# Patient Record
Sex: FEMALE | Race: WHITE | NOT HISPANIC OR LATINO | Employment: FULL TIME | URBAN - METROPOLITAN AREA
[De-identification: names, ages, dates, MRNs, and addresses within clinical notes are randomized per-mention and may not be internally consistent; named-entity substitution may affect disease eponyms.]

---

## 2017-01-26 ENCOUNTER — HISTORICAL (OUTPATIENT)
Dept: ADMINISTRATIVE | Facility: HOSPITAL | Age: 47
End: 2017-01-26

## 2017-01-26 LAB
ALBUMIN SERPL-MCNC: 4.2 G/DL (ref 3.1–4.7)
ALP SERPL-CCNC: 72 IU/L (ref 40–104)
ALT (SGPT): 21 IU/L (ref 3–33)
AST SERPL-CCNC: 16 IU/L (ref 10–40)
BASOPHILS NFR BLD: 0.1 K/UL (ref 0–0.2)
BASOPHILS NFR BLD: 1 %
BILIRUB SERPL-MCNC: 0.4 MG/DL (ref 0.3–1)
BUN SERPL-MCNC: 14 MG/DL (ref 8–20)
CALCIUM SERPL-MCNC: 9.6 MG/DL (ref 7.7–10.4)
CHLORIDE: 104 MMOL/L (ref 98–110)
CO2 SERPL-SCNC: 26.9 MMOL/L (ref 22.8–31.6)
CREATININE: 0.71 MG/DL (ref 0.6–1.4)
CRP SERPL-MCNC: 0.96 MG/DL (ref 0–1.4)
EOSINOPHIL NFR BLD: 0.3 K/UL (ref 0–0.7)
EOSINOPHIL NFR BLD: 3.7 %
ERYTHROCYTE [DISTWIDTH] IN BLOOD BY AUTOMATED COUNT: 16.9 % (ref 11.7–14.9)
GLUCOSE: 102 MG/DL (ref 70–99)
GRAN #: 5.7 K/UL (ref 1.4–6.5)
GRAN%: 69.1 %
HCT VFR BLD AUTO: 36.4 % (ref 36–48)
HGB BLD-MCNC: 11.4 G/DL (ref 12–15)
IMMATURE GRANS (ABS): 0 K/UL (ref 0–1)
IMMATURE GRANULOCYTES: 0.5 %
LYMPH #: 1.6 K/UL (ref 1.2–3.4)
LYMPH%: 19.1 %
MCH RBC QN AUTO: 25.1 PG (ref 25–35)
MCHC RBC AUTO-ENTMCNC: 31.3 G/DL (ref 31–36)
MCV RBC AUTO: 80 FL (ref 79–98)
MONO #: 0.6 K/UL (ref 0.1–0.6)
MONO%: 6.6 %
NUCLEATED RBCS: 0 %
PLATELET # BLD AUTO: 334 K/UL (ref 140–440)
PMV BLD AUTO: 10.7 FL (ref 8.8–12.7)
POTASSIUM SERPL-SCNC: 4.1 MMOL/L (ref 3.5–5)
PROT SERPL-MCNC: 7 G/DL (ref 6–8.2)
RBC # BLD AUTO: 4.55 M/UL (ref 3.5–5.5)
SODIUM: 140 MMOL/L (ref 134–144)
WBC # BLD AUTO: 8.3 K/UL (ref 5–10)

## 2017-04-13 ENCOUNTER — HISTORICAL (OUTPATIENT)
Dept: ADMINISTRATIVE | Facility: HOSPITAL | Age: 47
End: 2017-04-13

## 2017-04-13 LAB
ALBUMIN SERPL-MCNC: 4.1 G/DL (ref 3.1–4.7)
ALP SERPL-CCNC: 68 IU/L (ref 40–104)
ALT (SGPT): 22 IU/L (ref 3–33)
AST SERPL-CCNC: 16 IU/L (ref 10–40)
BASOPHILS NFR BLD: 0.1 K/UL (ref 0–0.2)
BASOPHILS NFR BLD: 0.9 %
BILIRUB SERPL-MCNC: 0.4 MG/DL (ref 0.3–1)
BUN SERPL-MCNC: 19 MG/DL (ref 8–20)
CALCIUM SERPL-MCNC: 9.6 MG/DL (ref 7.7–10.4)
CHLORIDE: 103 MMOL/L (ref 98–110)
CO2 SERPL-SCNC: 27.3 MMOL/L (ref 22.8–31.6)
CREATININE: 0.94 MG/DL (ref 0.6–1.4)
CRP SERPL-MCNC: 0.51 MG/DL (ref 0–1.4)
EOSINOPHIL NFR BLD: 0.3 K/UL (ref 0–0.7)
EOSINOPHIL NFR BLD: 3.9 %
ERYTHROCYTE [DISTWIDTH] IN BLOOD BY AUTOMATED COUNT: 16.6 % (ref 11.7–14.9)
GLUCOSE: 100 MG/DL (ref 70–99)
GRAN #: 5.7 K/UL (ref 1.4–6.5)
GRAN%: 66.5 %
HCT VFR BLD AUTO: 37.9 % (ref 36–48)
HGB BLD-MCNC: 11.7 G/DL (ref 12–15)
IMMATURE GRANS (ABS): 0.1 K/UL (ref 0–1)
IMMATURE GRANULOCYTES: 0.6 %
LYMPH #: 1.8 K/UL (ref 1.2–3.4)
LYMPH%: 21.2 %
MCH RBC QN AUTO: 24.7 PG (ref 25–35)
MCHC RBC AUTO-ENTMCNC: 30.9 G/DL (ref 31–36)
MCV RBC AUTO: 80.1 FL (ref 79–98)
MONO #: 0.6 K/UL (ref 0.1–0.6)
MONO%: 6.9 %
NUCLEATED RBCS: 0 %
PLATELET # BLD AUTO: 336 K/UL (ref 140–440)
PMV BLD AUTO: 10.8 FL (ref 8.8–12.7)
POTASSIUM SERPL-SCNC: 4.1 MMOL/L (ref 3.5–5)
PROT SERPL-MCNC: 7 G/DL (ref 6–8.2)
RBC # BLD AUTO: 4.73 M/UL (ref 3.5–5.5)
SODIUM: 140 MMOL/L (ref 134–144)
URATE SERPL-MCNC: 4.6 MG/DL (ref 2.6–7.8)
WBC # BLD AUTO: 8.6 K/UL (ref 5–10)

## 2017-07-19 ENCOUNTER — OFFICE VISIT (OUTPATIENT)
Dept: RHEUMATOLOGY | Facility: CLINIC | Age: 47
End: 2017-07-19
Payer: COMMERCIAL

## 2017-07-19 VITALS
DIASTOLIC BLOOD PRESSURE: 84 MMHG | HEIGHT: 62 IN | BODY MASS INDEX: 43.36 KG/M2 | WEIGHT: 235.63 LBS | SYSTOLIC BLOOD PRESSURE: 130 MMHG

## 2017-07-19 DIAGNOSIS — M05.742 RHEUMATOID ARTHRITIS INVOLVING BOTH HANDS WITH POSITIVE RHEUMATOID FACTOR: Primary | ICD-10-CM

## 2017-07-19 DIAGNOSIS — M05.741 RHEUMATOID ARTHRITIS INVOLVING BOTH HANDS WITH POSITIVE RHEUMATOID FACTOR: Primary | ICD-10-CM

## 2017-07-19 LAB
ALBUMIN SERPL-MCNC: 4.1 G/DL (ref 3.1–4.7)
ALP SERPL-CCNC: 74 IU/L (ref 40–104)
ALT (SGPT): 20 IU/L (ref 3–33)
AST SERPL-CCNC: 14 IU/L (ref 10–40)
BASOPHILS NFR BLD: 0.1 K/UL (ref 0–0.2)
BASOPHILS NFR BLD: 0.9 %
BILIRUB SERPL-MCNC: 0.5 MG/DL (ref 0.3–1)
BUN SERPL-MCNC: 14 MG/DL (ref 8–20)
CALCIUM SERPL-MCNC: 9.3 MG/DL (ref 7.7–10.4)
CHLORIDE: 107 MMOL/L (ref 98–110)
CO2 SERPL-SCNC: 24.7 MMOL/L (ref 22.8–31.6)
CREATININE: 0.78 MG/DL (ref 0.6–1.4)
CRP SERPL-MCNC: 0.68 MG/DL (ref 0–1.4)
EOSINOPHIL NFR BLD: 0.2 K/UL (ref 0–0.7)
EOSINOPHIL NFR BLD: 2.3 %
ERYTHROCYTE [DISTWIDTH] IN BLOOD BY AUTOMATED COUNT: 18 % (ref 11.7–14.9)
GLUCOSE: 104 MG/DL (ref 70–99)
GRAN #: 7.3 K/UL (ref 1.4–6.5)
GRAN%: 72.2 %
HCT VFR BLD AUTO: 38.4 % (ref 36–48)
HGB BLD-MCNC: 12.1 G/DL (ref 12–15)
IMMATURE GRANS (ABS): 0 K/UL (ref 0–1)
IMMATURE GRANULOCYTES: 0.4 %
LYMPH #: 1.8 K/UL (ref 1.2–3.4)
LYMPH%: 18.2 %
MCH RBC QN AUTO: 25 PG (ref 25–35)
MCHC RBC AUTO-ENTMCNC: 31.5 G/DL (ref 31–36)
MCV RBC AUTO: 79.3 FL (ref 79–98)
MONO #: 0.6 K/UL (ref 0.1–0.6)
MONO%: 6 %
NUCLEATED RBCS: 0 %
PLATELET # BLD AUTO: 332 K/UL (ref 140–440)
PMV BLD AUTO: 10.9 FL (ref 8.8–12.7)
POTASSIUM SERPL-SCNC: 4 MMOL/L (ref 3.5–5)
PROT SERPL-MCNC: 7.2 G/DL (ref 6–8.2)
RBC # BLD AUTO: 4.84 M/UL (ref 3.5–5.5)
SODIUM: 141 MMOL/L (ref 134–144)
WBC # BLD AUTO: 10.1 K/UL (ref 5–10)

## 2017-07-19 PROCEDURE — 99213 OFFICE O/P EST LOW 20 MIN: CPT | Mod: ,,, | Performed by: INTERNAL MEDICINE

## 2017-07-19 RX ORDER — CYCLOBENZAPRINE HCL 10 MG
10 TABLET ORAL NIGHTLY
Qty: 30 TABLET | Refills: 0 | Status: SHIPPED | OUTPATIENT
Start: 2017-07-19 | End: 2017-08-13 | Stop reason: SDUPTHER

## 2017-07-19 RX ORDER — TRAMADOL HYDROCHLORIDE 50 MG/1
50 TABLET ORAL 2 TIMES DAILY PRN
COMMUNITY
End: 2017-08-01 | Stop reason: SDUPTHER

## 2017-07-19 RX ORDER — PREDNISONE 5 MG/1
5 TABLET ORAL
COMMUNITY
Start: 2017-01-26 | End: 2017-08-01 | Stop reason: SDUPTHER

## 2017-07-19 RX ORDER — HYDROXYCHLOROQUINE SULFATE 200 MG/1
200 TABLET, FILM COATED ORAL DAILY
COMMUNITY
Start: 2017-04-13 | End: 2018-01-15 | Stop reason: SDUPTHER

## 2017-07-19 RX ORDER — ONDANSETRON 4 MG/1
4 TABLET, ORALLY DISINTEGRATING ORAL EVERY 8 HOURS PRN
Refills: 1 | COMMUNITY
Start: 2017-04-17 | End: 2018-04-02

## 2017-07-19 RX ORDER — ESOMEPRAZOLE MAGNESIUM 20 MG/1
20 TABLET, DELAYED RELEASE ORAL DAILY
COMMUNITY

## 2017-07-19 RX ORDER — IBUPROFEN 800 MG/1
800 TABLET ORAL DAILY
COMMUNITY
End: 2017-12-09 | Stop reason: SDUPTHER

## 2017-07-19 RX ORDER — ACETAMINOPHEN AND CODEINE PHOSPHATE 300; 30 MG/1; MG/1
1 TABLET ORAL EVERY 8 HOURS PRN
Refills: 1 | COMMUNITY
Start: 2017-06-23 | End: 2018-01-30

## 2017-07-19 RX ORDER — METFORMIN HYDROCHLORIDE 500 MG/1
500 TABLET ORAL DAILY
COMMUNITY
End: 2022-12-20

## 2017-07-19 RX ORDER — FOLIC ACID 1 MG/1
1 TABLET ORAL DAILY
COMMUNITY
Start: 2017-01-26 | End: 2018-03-16 | Stop reason: SDUPTHER

## 2017-07-19 RX ORDER — LOSARTAN POTASSIUM 50 MG/1
50 TABLET ORAL DAILY
COMMUNITY

## 2017-07-19 RX ORDER — AMLODIPINE BESYLATE 10 MG/1
10 TABLET ORAL DAILY
COMMUNITY
End: 2023-10-04

## 2017-07-19 RX ORDER — FLUOXETINE HYDROCHLORIDE 40 MG/1
40 CAPSULE ORAL DAILY
COMMUNITY

## 2017-07-19 RX ORDER — METHOTREXATE 2.5 MG/1
2.5 TABLET ORAL WEEKLY
COMMUNITY
Start: 2017-04-13 | End: 2017-07-28 | Stop reason: SDUPTHER

## 2017-07-19 NOTE — PROGRESS NOTES
[facility] RHEUMATOLOGY            PROGRESS NOTE      Subjective:       Patient ID:   NAME: Annita Bettencourt : 1970     47 y.o. female    Referring Doc: No ref. provider found  Other Physicians:    Chief Complaint:  Rheumatoid Arthritis and Insomnia      History of Present Illness:     Patient returns today for a regularly scheduled follow-up visit for Rheumatoid arthritis      The patient is doing well except for fatigue from insomnia. She has tried melatonin and over-the-counter remedies but they are not effective  No prolonged morning stiffness or joint swelling. Occasional arthralgias relieved with her medication  No gastrointestinal complaints.            ROS:   GEN:  No  fever, night sweats . weight is stable   + fatigue  SKIN: no rashes, no bruising, no ulcerations, no Raynaud's  HEENT: no HA's, No visual changes, no mucosal ulcers, no sicca symptoms,  CV:   no CP, SOB, PND, SHI, no orthopnea, no palpitations  PULM: normal with no SOB, cough, hemoptysis, sputum or pleuritic pain  GI:  no abdominal pain, nausea, vomiting, constipation, diarrhea, melanotic stools, BRBPR, hematemesis, no dysphagia  NEURO: no paresthesia except occasionally on the fifth finger, no headaches, visual disturbances, muscle weakness  MUSCULOSKELETAL:no joint swelling, prolonged AM stiffness, no back pain, no muscle pain  Allergies:  Review of patient's allergies indicates:   Allergen Reactions    Sulfa (sulfonamide antibiotics) Rash       Medications:    Current Outpatient Prescriptions:     folic acid (FOLVITE) 1 MG tablet, Take 1 mg by mouth once daily., Disp: , Rfl:     hydroxychloroquine (PLAQUENIL) 200 mg tablet, Take 200 mg by mouth once daily., Disp: , Rfl:     methotrexate 2.5 MG Tab, Take 2.5 tablets by mouth once a week. Take 5 tablets once a week, Disp: , Rfl:     predniSONE (DELTASONE) 5 MG tablet, Take 5 mg by mouth as needed., Disp: , Rfl:     acetaminophen-codeine 300-30mg (TYLENOL #3) 300-30 mg Tab,  "Take 1 tablet by mouth every 8 (eight) hours as needed., Disp: , Rfl: 1    amlodipine (NORVASC) 10 MG tablet, Take 10 mg by mouth once daily., Disp: , Rfl:     esomeprazole magnesium (NEXIUM 24HR) 20 mg TbEC, Take 20 mg by mouth once daily., Disp: , Rfl:     fluoxetine (PROZAC) 40 MG capsule, Take 40 mg by mouth once daily., Disp: , Rfl:     ibuprofen (ADVIL,MOTRIN) 800 MG tablet, Take 800 mg by mouth once daily., Disp: , Rfl:     losartan (COZAAR) 50 MG tablet, Take 50 mg by mouth once daily., Disp: , Rfl:     metformin (GLUCOPHAGE) 500 MG tablet, Take 500 mg by mouth once daily., Disp: , Rfl:     ondansetron (ZOFRAN-ODT) 4 MG TbDL, Take 4 mg by mouth every 8 (eight) hours as needed., Disp: , Rfl: 1    tramadol (ULTRAM) 50 mg tablet, Take 50 mg by mouth 2 (two) times daily as needed., Disp: , Rfl:     PMHx/PSHx Updates:        Objective:     Vitals:  Blood pressure 130/84, height 5' 2" (1.575 m), weight 106.9 kg (235 lb 9.6 oz).    Physical Examination:   GEN: no apparent distress, comfortable; AAOx3  SKIN: no rashes,no ulceration, no Raynaud's, no petechiae, no SQ nodules,  HEAD: normal  EYES: no pallor, no icterus,   NECK: no masses, thyroid normal, trachea midline, no LAD/LN's, supple  CV: RRR with no murmur; l S1 and S2 reg. ,no gallop no rubs,   CHEST: Normal respiratory effort; CTAB; normal breath sounds; no wheeze or crackles  ABDOM: nontender and nondistended; soft; no masses; no rebound/guarding  MUSC/Skeletal: ROM normal; no crepitus; joints without synovitis,  no deformities  No joint swelling or tenderness of PIP, MCP, wrist, elbow, shoulder, or knee joints  EXTREM: no clubbing, cyanosis, no edema  NEURO: grossly intact; motor WNL; AAOx3; ,   PSYCH: normal mood, affect and behavior  LYMPH: normal cervical, supraclavicular          Labs:   Lab Results   Component Value Date    WBC 8.6 04/13/2017    HGB 11.7 (L) 04/13/2017    HCT 37.9 04/13/2017    MCV 80.1 04/13/2017     04/13/2017    " CMP  @LASTLAB(NA,K,CL,CO2,GLU,BUN,Creatinine,Calcium,PROT,Albumin,Bilitot,Alkphos,AST,ALT,CRP,ESR,RF,CCP,KERLINE,SSA,CPK,uric acid) )@  I have reviewed all available lab results and radiology reports.    Radiology/Diagnostic Studies:        Assessment/Plan:   (1) 47 y.o. female with diagnosis of Rheumatoid Arthritis  She is stable    2) Insomnia.. Try Flexeril 5-10 mg before bedtime. Explain measures to try to get a better sleep at night like avoiding caffeine,not watching TV or computer close to bedtime.    CBC, CMP, CRP        Discussion:     I have explained all of the above in detail and the patient understands all of the current recommendation(s). I have answered all questions to the best of my ability and to their complete satisfaction.       The patient is to continue with the current management plan         RTC in  3 months       Electronically signed by Santos Presley MD

## 2017-07-31 RX ORDER — METHOTREXATE 2.5 MG/1
TABLET ORAL
Qty: 20 TABLET | Refills: 2 | Status: SHIPPED | OUTPATIENT
Start: 2017-07-31 | End: 2017-10-16 | Stop reason: SDUPTHER

## 2017-08-01 RX ORDER — PREDNISONE 5 MG/1
TABLET ORAL
Qty: 100 TABLET | Refills: 1 | Status: SHIPPED | OUTPATIENT
Start: 2017-08-01 | End: 2018-08-07 | Stop reason: SDUPTHER

## 2017-08-01 RX ORDER — TRAMADOL HYDROCHLORIDE 50 MG/1
TABLET ORAL
Qty: 180 TABLET | Refills: 2 | Status: SHIPPED | OUTPATIENT
Start: 2017-08-01 | End: 2018-03-29 | Stop reason: SDUPTHER

## 2017-08-15 RX ORDER — CYCLOBENZAPRINE HCL 10 MG
TABLET ORAL
Qty: 30 TABLET | Refills: 0 | Status: SHIPPED | OUTPATIENT
Start: 2017-08-15 | End: 2017-10-02 | Stop reason: SDUPTHER

## 2017-10-02 RX ORDER — CYCLOBENZAPRINE HCL 10 MG
TABLET ORAL
Qty: 30 TABLET | Refills: 3 | Status: SHIPPED | OUTPATIENT
Start: 2017-10-02 | End: 2019-02-03 | Stop reason: SDUPTHER

## 2017-10-16 ENCOUNTER — OFFICE VISIT (OUTPATIENT)
Dept: RHEUMATOLOGY | Facility: CLINIC | Age: 47
End: 2017-10-16
Payer: COMMERCIAL

## 2017-10-16 VITALS
SYSTOLIC BLOOD PRESSURE: 132 MMHG | DIASTOLIC BLOOD PRESSURE: 80 MMHG | HEIGHT: 62 IN | WEIGHT: 247.69 LBS | BODY MASS INDEX: 45.58 KG/M2

## 2017-10-16 DIAGNOSIS — M05.79 RHEUMATOID ARTHRITIS INVOLVING MULTIPLE SITES WITH POSITIVE RHEUMATOID FACTOR: Primary | ICD-10-CM

## 2017-10-16 DIAGNOSIS — M70.62 TROCHANTERIC BURSITIS OF LEFT HIP: ICD-10-CM

## 2017-10-16 LAB
ALBUMIN SERPL-MCNC: 3.9 G/DL (ref 3.1–4.7)
ALP SERPL-CCNC: 71 IU/L (ref 40–104)
ALT (SGPT): 23 IU/L (ref 3–33)
AST SERPL-CCNC: 22 IU/L (ref 10–40)
BASOPHILS NFR BLD: 0.1 K/UL (ref 0–0.2)
BASOPHILS NFR BLD: 0.8 %
BILIRUB SERPL-MCNC: 0.6 MG/DL (ref 0.3–1)
BUN SERPL-MCNC: 11 MG/DL (ref 8–20)
CALCIUM SERPL-MCNC: 9.5 MG/DL (ref 7.7–10.4)
CHLORIDE: 104 MMOL/L (ref 98–110)
CO2 SERPL-SCNC: 27.3 MMOL/L (ref 22.8–31.6)
CREATININE: 0.66 MG/DL (ref 0.6–1.4)
CRP SERPL-MCNC: 0.95 MG/DL (ref 0–1.4)
EOSINOPHIL NFR BLD: 0.2 K/UL (ref 0–0.7)
EOSINOPHIL NFR BLD: 2.8 %
ERYTHROCYTE [DISTWIDTH] IN BLOOD BY AUTOMATED COUNT: 17.1 % (ref 11.7–14.9)
GLUCOSE: 107 MG/DL (ref 70–99)
GRAN #: 5.7 K/UL (ref 1.4–6.5)
GRAN%: 72.1 %
HCT VFR BLD AUTO: 38.8 % (ref 36–48)
HGB BLD-MCNC: 12.1 G/DL (ref 12–15)
IMMATURE GRANS (ABS): 0.1 K/UL (ref 0–1)
IMMATURE GRANULOCYTES: 0.6 %
LYMPH #: 1.4 K/UL (ref 1.2–3.4)
LYMPH%: 18.1 %
MCH RBC QN AUTO: 25.6 PG (ref 25–35)
MCHC RBC AUTO-ENTMCNC: 31.2 G/DL (ref 31–36)
MCV RBC AUTO: 82 FL (ref 79–98)
MONO #: 0.4 K/UL (ref 0.1–0.6)
MONO%: 5.6 %
NUCLEATED RBCS: 0 %
PLATELET # BLD AUTO: 314 K/UL (ref 140–440)
PMV BLD AUTO: 10.9 FL (ref 8.8–12.7)
POTASSIUM SERPL-SCNC: 4.1 MMOL/L (ref 3.5–5)
PROT SERPL-MCNC: 7.2 G/DL (ref 6–8.2)
RBC # BLD AUTO: 4.73 M/UL (ref 3.5–5.5)
SODIUM: 141 MMOL/L (ref 134–144)
TSH SERPL DL<=0.005 MIU/L-ACNC: 0.92 ULU/ML (ref 0.3–5.6)
WBC # BLD AUTO: 7.9 K/UL (ref 5–10)

## 2017-10-16 PROCEDURE — 20610 DRAIN/INJ JOINT/BURSA W/O US: CPT | Mod: LT,,, | Performed by: INTERNAL MEDICINE

## 2017-10-16 PROCEDURE — 99213 OFFICE O/P EST LOW 20 MIN: CPT | Mod: 25,,, | Performed by: INTERNAL MEDICINE

## 2017-10-16 RX ORDER — TRIAMCINOLONE ACETONIDE 40 MG/ML
40 INJECTION, SUSPENSION INTRA-ARTICULAR; INTRAMUSCULAR
Status: DISCONTINUED | OUTPATIENT
Start: 2017-10-16 | End: 2017-10-16 | Stop reason: HOSPADM

## 2017-10-16 RX ORDER — METHOTREXATE 2.5 MG/1
TABLET ORAL
Qty: 60 TABLET | Refills: 1 | Status: SHIPPED | OUTPATIENT
Start: 2017-10-16 | End: 2018-01-15 | Stop reason: SDUPTHER

## 2017-10-16 RX ORDER — DEXAMETHASONE SODIUM PHOSPHATE 4 MG/ML
2 INJECTION, SOLUTION INTRA-ARTICULAR; INTRALESIONAL; INTRAMUSCULAR; INTRAVENOUS; SOFT TISSUE
Status: DISCONTINUED | OUTPATIENT
Start: 2017-10-16 | End: 2017-10-16 | Stop reason: HOSPADM

## 2017-10-16 RX ADMIN — DEXAMETHASONE SODIUM PHOSPHATE 2 MG: 4 INJECTION, SOLUTION INTRA-ARTICULAR; INTRALESIONAL; INTRAMUSCULAR; INTRAVENOUS; SOFT TISSUE at 11:10

## 2017-10-16 RX ADMIN — TRIAMCINOLONE ACETONIDE 40 MG: 40 INJECTION, SUSPENSION INTRA-ARTICULAR; INTRAMUSCULAR at 11:10

## 2017-10-16 NOTE — PROGRESS NOTES
[facility] RHEUMATOLOGY            PROGRESS NOTE      Subjective:       Patient ID:   NAME: Annita Bettencourt : 1970     47 y.o. female    Referring Doc: No ref. provider found  Other Physicians:    Chief Complaint:  Rheumatoid Arthritis; Rash (bilateral LE); Swelling; and Pain (4/10 left hip and right shin)      History of Present Illness:     Patient returns today for a regularly scheduled follow-up visit for   RA    The patient is doing well re RA but had episode of rash on lower legs a few days ago with pedal edema  No CP,no cough  Some SHI  AM stiffness lasting 10-15 mins.Fatigue  Pain in the left trochanteric area.          ROS:   GEN:  No  fever, night sweats . weight is stable   + fatigue  SKIN: no rashes, no bruising, no ulcerations, no Raynaud's  HEENT: no HA's, No visual changes, no mucosal ulcers, no sicca symptoms,  CV:   no CP, SOB, PND, SHI, no orthopnea, no palpitations  PULM: normal with no SOB, cough, hemoptysis, sputum or pleuritic pain  GI:  no abdominal pain, nausea, vomiting, constipation, diarrhea, melanotic stools, BRBPR, hematemesis, no dysphagia  :   no dysuria  NEURO: no paresthesias, headaches, visual disturbances, muscle weakness  MUSCULOSKELETAL:no joint swelling, prolonged AM stiffness, no back pain, no muscle pain  Allergies:  Review of patient's allergies indicates:   Allergen Reactions    Sulfa (sulfonamide antibiotics) Rash       Medications:    Current Outpatient Prescriptions:     acetaminophen-codeine 300-30mg (TYLENOL #3) 300-30 mg Tab, Take 1 tablet by mouth every 8 (eight) hours as needed., Disp: , Rfl: 1    amlodipine (NORVASC) 10 MG tablet, Take 10 mg by mouth once daily., Disp: , Rfl:     cyclobenzaprine (FLEXERIL) 10 MG tablet, TAKE 1 TABLET BY MOUTH EVERY EVENING, Disp: 30 tablet, Rfl: 3    esomeprazole magnesium (NEXIUM 24HR) 20 mg TbEC, Take 20 mg by mouth once daily., Disp: , Rfl:     fluoxetine (PROZAC) 40 MG capsule, Take 40 mg by mouth once  "daily., Disp: , Rfl:     folic acid (FOLVITE) 1 MG tablet, Take 1 mg by mouth once daily., Disp: , Rfl:     hydroxychloroquine (PLAQUENIL) 200 mg tablet, Take 200 mg by mouth once daily., Disp: , Rfl:     ibuprofen (ADVIL,MOTRIN) 800 MG tablet, Take 800 mg by mouth once daily., Disp: , Rfl:     losartan (COZAAR) 50 MG tablet, Take 50 mg by mouth once daily., Disp: , Rfl:     metformin (GLUCOPHAGE) 500 MG tablet, Take 500 mg by mouth once daily., Disp: , Rfl:     methotrexate 2.5 MG Tab, TAKE 5 TABLETS BY MOUTH EVERY WEEK, Disp: 60 tablet, Rfl: 1    ondansetron (ZOFRAN-ODT) 4 MG TbDL, Take 4 mg by mouth every 8 (eight) hours as needed., Disp: , Rfl: 1    tramadol (ULTRAM) 50 mg tablet, TAKE 1 OR 2 TABLETS EVERY 6 TO 8 HOURS AS NEEDED FOR PAIN, Disp: 180 tablet, Rfl: 2    predniSONE (DELTASONE) 5 MG tablet, TAKE 1 TABLET BY MOUTH TWO OR THREE TIMES DAILY AFTER MEALS, Disp: 100 tablet, Rfl: 1    PMHx/PSHx Updates:          Last Eye Exam : 2016      Objective:     Vitals:  Blood pressure 132/80, height 5' 2" (1.575 m), weight 112.4 kg (247 lb 11.2 oz).    Physical Examination:   GEN: no apparent distress, comfortable; AAOx3  SKIN: + rashes( scattered  erythematous lesions on the anterior aspect of lower legs,no ulceration, no Raynaud's, no petechiae, no SQ nodules,  HEAD: normal  EYES: no pallor, no icterus,   NECK: no masses, thyroid normal, trachea midline, no LAD/LN's, supple  CV: RRR with no murmur; l S1 and S2 reg. ,no gallop no rubs,   CHEST: Normal respiratory effort; CTAB; normal breath sounds; no wheeze or crackles  MUSC/Skeletal: ROM normal; no crepitus; joints without synovitis,  no deformities  No joint swelling or tenderness of PIP, MCP, wrist, elbow, shoulder, or knee joints tender left trochanteric bursa  EXTREM: no clubbing, cyanosis, Trace edema,normal  pulses   NEURO: grossly intact; motor WNL; AAOx3; ,  PSYCH: normal mood, affect and behavior  LYMPH: normal cervical, " supraclavicular          Labs:   Lab Results   Component Value Date    WBC 10.1 (H) 07/19/2017    HGB 12.1 07/19/2017    HCT 38.4 07/19/2017    MCV 79.3 07/19/2017     07/19/2017    CMP  @LASTLAB(NA,K,CL,CO2,GLU,BUN,Creatinine,Calcium,PROT,Albumin,Bilitot,Alkphos,AST,ALT,CRP,ESR,RF,CCP,KERLINE,SSA,CPK,uric acid) )@  I have reviewed all available lab results and radiology reports.    Radiology/Diagnostic Studies:        Assessment/Plan:   (1) 47 y.o. female with diagnosis of Rheumatoid arthritis. This is stable.    Left trochanteric bursitis injected as per procedure note    CBC CMP CRP UA        Discussion:     I have explained all of the above in detail and the patient understands all of the current recommendation(s). I have answered all questions to the best of my ability and to their complete satisfaction.       The patient is to continue with the current management plan         RTC in   3 months or before as needed      Electronically signed by Santos Presley MD

## 2017-10-16 NOTE — PROCEDURES
Large Joint Aspiration/Injection  Date/Time: 10/16/2017 11:32 AM  Performed by: VERONICA BRADSHAW  Authorized by: VERONICA BRADSHAW     Consent Done?:  Not Needed  Indications:  Pain    Location:  Hip  Site:  L greater trochanteric bursa  Medications:  40 mg triamcinolone acetonide 40 mg/mL; 2 mg dexamethasone 4 mg/mL  Patient tolerance:  Patient tolerated the procedure well with no immediate complications

## 2017-12-11 RX ORDER — IBUPROFEN 800 MG/1
TABLET ORAL
Qty: 90 TABLET | Refills: 2 | Status: SHIPPED | OUTPATIENT
Start: 2017-12-11 | End: 2018-01-15 | Stop reason: SDUPTHER

## 2018-01-15 ENCOUNTER — OFFICE VISIT (OUTPATIENT)
Dept: RHEUMATOLOGY | Facility: CLINIC | Age: 48
End: 2018-01-15
Payer: COMMERCIAL

## 2018-01-15 VITALS
BODY MASS INDEX: 46.19 KG/M2 | HEIGHT: 62 IN | DIASTOLIC BLOOD PRESSURE: 70 MMHG | SYSTOLIC BLOOD PRESSURE: 112 MMHG | WEIGHT: 251 LBS

## 2018-01-15 DIAGNOSIS — M05.79 RHEUMATOID ARTHRITIS INVOLVING MULTIPLE SITES WITH POSITIVE RHEUMATOID FACTOR: Primary | ICD-10-CM

## 2018-01-15 LAB
ALBUMIN SERPL-MCNC: 4.1 G/DL (ref 3.1–4.7)
ALP SERPL-CCNC: 73 IU/L (ref 40–104)
ALT (SGPT): 23 IU/L (ref 3–33)
AST SERPL-CCNC: 14 IU/L (ref 10–40)
BASOPHILS NFR BLD: 0.1 K/UL (ref 0–0.2)
BASOPHILS NFR BLD: 0.8 %
BILIRUB SERPL-MCNC: 0.4 MG/DL (ref 0.3–1)
BUN SERPL-MCNC: 16 MG/DL (ref 8–20)
CALCIUM SERPL-MCNC: 9.5 MG/DL (ref 7.7–10.4)
CHLORIDE: 102 MMOL/L (ref 98–110)
CO2 SERPL-SCNC: 28.3 MMOL/L (ref 22.8–31.6)
CREATININE: 0.75 MG/DL (ref 0.6–1.4)
CRP SERPL-MCNC: 0.49 MG/DL (ref 0–1.4)
EOSINOPHIL NFR BLD: 0.3 K/UL (ref 0–0.7)
EOSINOPHIL NFR BLD: 2.7 %
ERYTHROCYTE [DISTWIDTH] IN BLOOD BY AUTOMATED COUNT: 16.6 % (ref 11.7–14.9)
GLUCOSE: 88 MG/DL (ref 70–99)
GRAN #: 6.8 K/UL (ref 1.4–6.5)
GRAN%: 63.2 %
HCT VFR BLD AUTO: 39.1 % (ref 36–48)
HGB BLD-MCNC: 12.3 G/DL (ref 12–15)
IMMATURE GRANS (ABS): 0.1 K/UL (ref 0–1)
IMMATURE GRANULOCYTES: 0.7 %
LYMPH #: 2.8 K/UL (ref 1.2–3.4)
LYMPH%: 25.8 %
MCH RBC QN AUTO: 25.7 PG (ref 25–35)
MCHC RBC AUTO-ENTMCNC: 31.5 G/DL (ref 31–36)
MCV RBC AUTO: 81.8 FL (ref 79–98)
MONO #: 0.7 K/UL (ref 0.1–0.6)
MONO%: 6.8 %
NUCLEATED RBCS: 0 %
PLATELET # BLD AUTO: 337 K/UL (ref 140–440)
PMV BLD AUTO: 11.3 FL (ref 8.8–12.7)
POTASSIUM SERPL-SCNC: 3.5 MMOL/L (ref 3.5–5)
PROT SERPL-MCNC: 7.1 G/DL (ref 6–8.2)
RBC # BLD AUTO: 4.78 M/UL (ref 3.5–5.5)
SODIUM: 140 MMOL/L (ref 134–144)
WBC # BLD AUTO: 10.8 K/UL (ref 5–10)

## 2018-01-15 PROCEDURE — 99212 OFFICE O/P EST SF 10 MIN: CPT | Mod: ,,, | Performed by: INTERNAL MEDICINE

## 2018-01-15 RX ORDER — HYDROCODONE BITARTRATE AND ACETAMINOPHEN 10; 325 MG/1; MG/1
1 TABLET ORAL EVERY 8 HOURS PRN
Refills: 0 | COMMUNITY
Start: 2018-01-05 | End: 2018-01-30 | Stop reason: SDUPTHER

## 2018-01-15 RX ORDER — IBUPROFEN 800 MG/1
TABLET ORAL
Qty: 90 TABLET | Refills: 2 | Status: SHIPPED | OUTPATIENT
Start: 2018-01-15 | End: 2020-10-29 | Stop reason: SDUPTHER

## 2018-01-15 RX ORDER — METHOTREXATE 2.5 MG/1
12.5 TABLET ORAL WEEKLY
Qty: 60 TABLET | Refills: 1 | Status: SHIPPED | OUTPATIENT
Start: 2018-01-15 | End: 2018-04-02 | Stop reason: DRUGHIGH

## 2018-01-15 RX ORDER — HYDROXYCHLOROQUINE SULFATE 200 MG/1
200 TABLET, FILM COATED ORAL DAILY
Qty: 60 TABLET | Refills: 3 | Status: SHIPPED | OUTPATIENT
Start: 2018-01-15 | End: 2018-04-02 | Stop reason: SDUPTHER

## 2018-01-15 NOTE — PROGRESS NOTES
[facility] RHEUMATOLOGY            PROGRESS NOTE      Subjective:       Patient ID:   NAME: Annita Bettencourt : 1970     47 y.o. female    Referring Doc: No ref. provider found  Other Physicians:    Chief Complaint:  No chief complaint on file.      History of Present Illness:     Patient returns today for a regularly scheduled follow-up visit for Rheumatoid Arthritis       The patient is doing well.Had episode of TMJ pain relieved after taking a few days prednisone.  Has had sinus congestion with cough,improving  No fevers  AM stiffness lasting 30 minutes  No joint swelling            ROS:   GEN:  No  fever, night sweats . weight is stable   + fatigue ( insomnia)  SKIN: no rashes, no bruising, no ulcerations, no Raynaud's  HEENT: no HA's, No visual changes, no mucosal ulcers, no sicca symptoms,  CV:   no CP, SOB, PND, SHI, no orthopnea, no palpitations  PULM: normal with no SOB, cough, hemoptysis, sputum or pleuritic pain  GI:  no abdominal pain, nausea, vomiting, constipation, diarrhea, melanotic stools, BRBPR, hematemesis, no dysphagia  :   no dysuria  NEURO: no paresthesias, headaches, visual disturbances, muscle weakness  MUSCULOSKELETAL:no joint swelling, prolonged AM stiffness, no back pain, no muscle pain  Allergies:  Review of patient's allergies indicates:   Allergen Reactions    Sulfa (sulfonamide antibiotics) Rash       Medications:    Current Outpatient Prescriptions:     acetaminophen-codeine 300-30mg (TYLENOL #3) 300-30 mg Tab, Take 1 tablet by mouth every 8 (eight) hours as needed., Disp: , Rfl: 1    amlodipine (NORVASC) 10 MG tablet, Take 10 mg by mouth once daily., Disp: , Rfl:     cyclobenzaprine (FLEXERIL) 10 MG tablet, TAKE 1 TABLET BY MOUTH EVERY EVENING, Disp: 30 tablet, Rfl: 3    esomeprazole magnesium (NEXIUM 24HR) 20 mg TbEC, Take 20 mg by mouth once daily., Disp: , Rfl:     fluoxetine (PROZAC) 40 MG capsule, Take 40 mg by mouth once daily., Disp: , Rfl:     folic acid  "(FOLVITE) 1 MG tablet, Take 1 mg by mouth once daily., Disp: , Rfl:     hydrocodone-acetaminophen 10-325mg (NORCO)  mg Tab, Take 1 tablet by mouth every 8 (eight) hours as needed., Disp: , Rfl: 0    hydroxychloroquine (PLAQUENIL) 200 mg tablet, Take 200 mg by mouth once daily., Disp: , Rfl:     ibuprofen (ADVIL,MOTRIN) 800 MG tablet, TAKE 1 TABLET BY MOUTH 2 OR 3 TIMES A DAY AS NEEDED, Disp: 90 tablet, Rfl: 2    losartan (COZAAR) 50 MG tablet, Take 50 mg by mouth once daily., Disp: , Rfl:     metformin (GLUCOPHAGE) 500 MG tablet, Take 500 mg by mouth once daily., Disp: , Rfl:     methotrexate 2.5 MG Tab, TAKE 5 TABLETS BY MOUTH EVERY WEEK, Disp: 60 tablet, Rfl: 1    ondansetron (ZOFRAN-ODT) 4 MG TbDL, Take 4 mg by mouth every 8 (eight) hours as needed., Disp: , Rfl: 1    predniSONE (DELTASONE) 5 MG tablet, TAKE 1 TABLET BY MOUTH TWO OR THREE TIMES DAILY AFTER MEALS, Disp: 100 tablet, Rfl: 1    tramadol (ULTRAM) 50 mg tablet, TAKE 1 OR 2 TABLETS EVERY 6 TO 8 HOURS AS NEEDED FOR PAIN, Disp: 180 tablet, Rfl: 2    PMHx/PSHx Updates:        Last Eye Exam: due this month      Objective:     Vitals:  Blood pressure 112/70, height 5' 2" (1.575 m), weight 113.9 kg (251 lb).    Physical Examination:   GEN: no apparent distress, comfortable; AAOx3  SKIN: no rashes,no ulceration, no Raynaud's, no petechiae, no SQ nodules,  HEAD: normal  EYES: no pallor, no icterus,   NECK: no masses, thyroid normal, trachea midline, no LAD/LN's, supple  CV: RRR with no murmur; l S1 and S2 reg. ,no gallop no rubs,   CHEST: Normal respiratory effort; CTAB; normal breath sounds; no wheeze or crackles  MUSC/Skeletal: ROM normal; no crepitus; joints without synovitis,  no deformities  No joint swelling or tenderness of PIP, MCP, wrist, elbow, shoulder, or knee jointsSlight tenderness left TMJ  EXTREM: no clubbing, cyanosis, no edema,normal  pulses   NEURO: grossly intact; motor WNL; AAOx3; ,   PSYCH: normal mood, affect and " behavior  LYMPH: normal cervical, supraclavicular          Labs:   Lab Results   Component Value Date    WBC 7.9 10/16/2017    HGB 12.1 10/16/2017    HCT 38.8 10/16/2017    MCV 82.0 10/16/2017     10/16/2017    CMP  @LASTLAB(NA,K,CL,CO2,GLU,BUN,Creatinine,Calcium,PROT,Albumin,Bilitot,Alkphos,AST,ALT,CRP,ESR,RF,CCP,KERLINE,SSA,CPK,uric acid) )@  I have reviewed all available lab results and radiology reports.    Radiology/Diagnostic Studies:        Assessment/Plan:   (1) 47 y.o. female with diagnosis of Rheumatoid arthritis. She is stable.    Plan: Continue methotrexate and plaquenil  CBC CMP CRP  Exam this month          Discussion:     I have explained all of the above in detail and the patient understands all of the current recommendation(s). I have answered all questions to the best of my ability and to their complete satisfaction.       The patient is to continue with the current management plan         RTC in   3 months      Electronically signed by Santos Presley MD

## 2018-01-30 ENCOUNTER — OFFICE VISIT (OUTPATIENT)
Dept: RHEUMATOLOGY | Facility: CLINIC | Age: 48
End: 2018-01-30
Payer: COMMERCIAL

## 2018-01-30 VITALS
BODY MASS INDEX: 45.82 KG/M2 | DIASTOLIC BLOOD PRESSURE: 76 MMHG | WEIGHT: 249 LBS | HEIGHT: 62 IN | SYSTOLIC BLOOD PRESSURE: 140 MMHG

## 2018-01-30 DIAGNOSIS — M05.79 RHEUMATOID ARTHRITIS INVOLVING MULTIPLE SITES WITH POSITIVE RHEUMATOID FACTOR: Primary | ICD-10-CM

## 2018-01-30 DIAGNOSIS — M25.551 HIP PAIN, ACUTE, RIGHT: ICD-10-CM

## 2018-01-30 PROCEDURE — 99214 OFFICE O/P EST MOD 30 MIN: CPT | Mod: ,,, | Performed by: INTERNAL MEDICINE

## 2018-01-30 PROCEDURE — 3008F BODY MASS INDEX DOCD: CPT | Mod: ,,, | Performed by: INTERNAL MEDICINE

## 2018-01-30 RX ORDER — HYDROCODONE BITARTRATE AND ACETAMINOPHEN 10; 325 MG/1; MG/1
1 TABLET ORAL EVERY 8 HOURS PRN
Qty: 21 TABLET | Refills: 0 | Status: SHIPPED | OUTPATIENT
Start: 2018-01-30 | End: 2018-04-02

## 2018-01-30 NOTE — PROGRESS NOTES
[facility] RHEUMATOLOGY            PROGRESS NOTE      Subjective:       Patient ID:   NAME: Annita Bettencourt : 1970     47 y.o. female    Referring Doc: No ref. provider found  Other Physicians:    Chief Complaint:  No chief complaint on file.      History of Present Illness:     Patient returns today for a  follow-up visit for RA      The patient has been experiencing severe pain in the right groin area for the past few days. She was seen in the emergency room at [facility] this morning ;she was given Solu Medrol ,Toradol and morphine. No x-rays were done according to patient.  No fevers no history of trauma. No other joint complaints.            ROS:   GEN:  No  fever, night sweats . weight is stable   No fatigue  SKIN: no rashes, no bruising, no ulcerations, no Raynaud's  HEENT: no HA's, No visual changes, no mucosal ulcers, no sicca symptoms,  CV:   no CP, SOB, PND, SHI, no orthopnea, no palpitations  PULM: normal with no SOB, cough, hemoptysis, sputum or pleuritic pain  GI:  no abdominal pain, nausea, vomiting, constipation, diarrhea, melanotic stools, BRBPR, hematemesis, no dysphagia  :   no dysuria  NEURO: no paresthesias, headaches, visual disturbances, muscle weakness  MUSCULOSKELETAL:no joint swelling, prolonged AM stiffness, no back pain, no muscle pain  Allergies:  Review of patient's allergies indicates:   Allergen Reactions    Sulfa (sulfonamide antibiotics) Rash       Medications:    Current Outpatient Prescriptions:     acetaminophen-codeine 300-30mg (TYLENOL #3) 300-30 mg Tab, Take 1 tablet by mouth every 8 (eight) hours as needed., Disp: , Rfl: 1    amlodipine (NORVASC) 10 MG tablet, Take 10 mg by mouth once daily., Disp: , Rfl:     cyclobenzaprine (FLEXERIL) 10 MG tablet, TAKE 1 TABLET BY MOUTH EVERY EVENING, Disp: 30 tablet, Rfl: 3    esomeprazole magnesium (NEXIUM 24HR) 20 mg TbEC, Take 20 mg by mouth once daily., Disp: , Rfl:     fluoxetine (PROZAC) 40 MG  "capsule, Take 40 mg by mouth once daily., Disp: , Rfl:     folic acid (FOLVITE) 1 MG tablet, Take 1 mg by mouth once daily., Disp: , Rfl:     hydrocodone-acetaminophen 10-325mg (NORCO)  mg Tab, Take 1 tablet by mouth every 8 (eight) hours as needed., Disp: , Rfl: 0    hydroxychloroquine (PLAQUENIL) 200 mg tablet, Take 1 tablet (200 mg total) by mouth once daily., Disp: 60 tablet, Rfl: 3    ibuprofen (ADVIL,MOTRIN) 800 MG tablet, TAKE 1 TABLET BY MOUTH 2 OR 3 TIMES A DAY AS NEEDED, Disp: 90 tablet, Rfl: 2    losartan (COZAAR) 50 MG tablet, Take 50 mg by mouth once daily., Disp: , Rfl:     metformin (GLUCOPHAGE) 500 MG tablet, Take 500 mg by mouth once daily., Disp: , Rfl:     methotrexate 2.5 MG Tab, Take 5 tablets (12.5 mg total) by mouth once a week., Disp: 60 tablet, Rfl: 1    ondansetron (ZOFRAN-ODT) 4 MG TbDL, Take 4 mg by mouth every 8 (eight) hours as needed., Disp: , Rfl: 1    predniSONE (DELTASONE) 5 MG tablet, TAKE 1 TABLET BY MOUTH TWO OR THREE TIMES DAILY AFTER MEALS, Disp: 100 tablet, Rfl: 1    tramadol (ULTRAM) 50 mg tablet, TAKE 1 OR 2 TABLETS EVERY 6 TO 8 HOURS AS NEEDED FOR PAIN, Disp: 180 tablet, Rfl: 2    PMHx/PSHx Updates:        Last Eye Exam      Objective:     Vitals:  Blood pressure (!) 140/76, height 5' 2" (1.575 m), weight 112.9 kg (249 lb).    Physical Examination:   GEN: no apparent distress, comfortable; AAOx3  SKIN: no rashes,no ulceration, no Raynaud's, no petechiae, no SQ nodules,  HEAD: normal  EYES: no pallor, no icterus, PERRLA  ENT:  ,no mucosal dryness or ulcerations  NECK: no masses, thyroid normal, trachea midline, no LAD/LN's, supple  CV: RRR with no murmur; l S1 and S2 reg. ,no gallop no rubs,   CHEST: Normal respiratory effort; CTAB; normal breath sounds; no wheeze or crackles  ABDOM: nontender and nondistended; soft; no masses; no rebound/guarding  MUSC/Skeletal: ROM normal; no crepitus; joints without synovitis,  no deformities  No joint swelling or " tenderness of PIP, MCP, wrist, elbow, shoulder, or knee jointsRight hip with limited range of motion due to pain. More pain on internal rotation.  EXTREM: no clubbing, cyanosis, no edema,normal  pulses   NEURO: grossly intact; motor WNL; AAOx3; ,   PSYCH: normal mood, affect and behavior  LYMPH: normal cervical, supraclavicular          Labs:   Lab Results   Component Value Date    WBC 10.8 (H) 01/15/2018    HGB 12.3 01/15/2018    HCT 39.1 01/15/2018    MCV 81.8 01/15/2018     01/15/2018    CMP  @LASTLAB(NA,K,CL,CO2,GLU,BUN,Creatinine,Calcium,PROT,Albumin,Bilitot,Alkphos,AST,ALT,CRP,ESR,RF,CCP,KERLINE,SSA,CPK,uric acid) )@  I have reviewed all available lab results and radiology reports.    Radiology/Diagnostic Studies:        Assessment/Plan:   (1) 47 y.o. female with diagnosis of Acute right hip pain.  Plan x-ray of the right hip now.   Increase prednisone to 20 mg daily for a week then 15 mg today.  Follow up in one week. If plain films are normal and pain continues will need an MRI of the right hip, rule out AVN                Discussion:     I have explained all of the above in detail and the patient understands all of the current recommendation(s). I have answered all questions to the best of my ability and to their complete satisfaction.       The patient is to continue with the current management plan         RTC in   1 week      Electronically signed by Santos Presley MD

## 2018-03-19 RX ORDER — FOLIC ACID 1 MG/1
TABLET ORAL
Qty: 30 TABLET | Refills: 2 | Status: SHIPPED | OUTPATIENT
Start: 2018-03-19 | End: 2018-04-02 | Stop reason: SDUPTHER

## 2018-03-29 RX ORDER — TRAMADOL HYDROCHLORIDE 50 MG/1
TABLET ORAL
Qty: 180 TABLET | Refills: 2 | Status: SHIPPED | OUTPATIENT
Start: 2018-03-29 | End: 2018-08-07 | Stop reason: SDUPTHER

## 2018-04-02 ENCOUNTER — OFFICE VISIT (OUTPATIENT)
Dept: RHEUMATOLOGY | Facility: CLINIC | Age: 48
End: 2018-04-02
Payer: COMMERCIAL

## 2018-04-02 ENCOUNTER — TELEPHONE (OUTPATIENT)
Dept: RHEUMATOLOGY | Facility: CLINIC | Age: 48
End: 2018-04-02

## 2018-04-02 VITALS
DIASTOLIC BLOOD PRESSURE: 90 MMHG | BODY MASS INDEX: 43.48 KG/M2 | SYSTOLIC BLOOD PRESSURE: 138 MMHG | WEIGHT: 237.69 LBS

## 2018-04-02 DIAGNOSIS — M05.79 RHEUMATOID ARTHRITIS INVOLVING MULTIPLE SITES WITH POSITIVE RHEUMATOID FACTOR: Primary | ICD-10-CM

## 2018-04-02 DIAGNOSIS — M19.91 PRIMARY OSTEOARTHRITIS, UNSPECIFIED SITE: ICD-10-CM

## 2018-04-02 LAB
ALBUMIN SERPL-MCNC: 4.2 G/DL (ref 3.1–4.7)
ALP SERPL-CCNC: 70 IU/L (ref 40–104)
ALT (SGPT): 18 IU/L (ref 3–33)
AST SERPL-CCNC: 15 IU/L (ref 10–40)
BASOPHILS NFR BLD: 0.1 K/UL (ref 0–0.2)
BASOPHILS NFR BLD: 0.9 %
BILIRUB SERPL-MCNC: 0.5 MG/DL (ref 0.3–1)
BUN SERPL-MCNC: 14 MG/DL (ref 8–20)
CALCIUM SERPL-MCNC: 9.8 MG/DL (ref 7.7–10.4)
CHLORIDE: 104 MMOL/L (ref 98–110)
CO2 SERPL-SCNC: 28.8 MMOL/L (ref 22.8–31.6)
CREATININE: 0.82 MG/DL (ref 0.6–1.4)
CRP SERPL-MCNC: 0.87 MG/DL (ref 0–1.4)
EOSINOPHIL NFR BLD: 0.3 K/UL (ref 0–0.7)
EOSINOPHIL NFR BLD: 3.8 %
ERYTHROCYTE [DISTWIDTH] IN BLOOD BY AUTOMATED COUNT: 16.8 % (ref 11.7–14.9)
GLUCOSE: 121 MG/DL (ref 70–99)
GRAN #: 4.7 K/UL (ref 1.4–6.5)
GRAN%: 67.8 %
HCT VFR BLD AUTO: 40 % (ref 36–48)
HGB BLD-MCNC: 12.2 G/DL (ref 12–15)
IMMATURE GRANS (ABS): 0 K/UL (ref 0–1)
IMMATURE GRANULOCYTES: 0.4 %
LYMPH #: 1.4 K/UL (ref 1.2–3.4)
LYMPH%: 20.9 %
MCH RBC QN AUTO: 25.2 PG (ref 25–35)
MCHC RBC AUTO-ENTMCNC: 30.5 G/DL (ref 31–36)
MCV RBC AUTO: 82.6 FL (ref 79–98)
MONO #: 0.4 K/UL (ref 0.1–0.6)
MONO%: 6.2 %
NUCLEATED RBCS: 0 %
PLATELET # BLD AUTO: 345 K/UL (ref 140–440)
PMV BLD AUTO: 10.9 FL (ref 8.8–12.7)
POTASSIUM SERPL-SCNC: 4.3 MMOL/L (ref 3.5–5)
PROT SERPL-MCNC: 7.1 G/DL (ref 6–8.2)
RBC # BLD AUTO: 4.84 M/UL (ref 3.5–5.5)
SODIUM: 142 MMOL/L (ref 134–144)
WBC # BLD AUTO: 6.9 K/UL (ref 5–10)

## 2018-04-02 PROCEDURE — 99212 OFFICE O/P EST SF 10 MIN: CPT | Mod: ,,, | Performed by: INTERNAL MEDICINE

## 2018-04-02 RX ORDER — HYDROXYCHLOROQUINE SULFATE 200 MG/1
200 TABLET, FILM COATED ORAL DAILY
Qty: 60 TABLET | Refills: 3 | Status: SHIPPED | OUTPATIENT
Start: 2018-04-02 | End: 2018-08-07 | Stop reason: SDUPTHER

## 2018-04-02 RX ORDER — ATENOLOL 25 MG/1
25 TABLET ORAL DAILY
Refills: 5 | COMMUNITY
Start: 2018-03-12 | End: 2023-02-13

## 2018-04-02 RX ORDER — ONDANSETRON 4 MG/1
4 TABLET, FILM COATED ORAL
COMMUNITY
Start: 2017-04-17 | End: 2022-11-03 | Stop reason: SDUPTHER

## 2018-04-02 RX ORDER — FOLIC ACID 1 MG/1
1000 TABLET ORAL DAILY
Qty: 30 TABLET | Refills: 2 | Status: SHIPPED | OUTPATIENT
Start: 2018-04-02 | End: 2018-08-07 | Stop reason: SDUPTHER

## 2018-04-02 RX ORDER — TIZANIDINE 2 MG/1
TABLET ORAL
COMMUNITY
Start: 2017-03-13 | End: 2018-08-07

## 2018-04-02 RX ORDER — PRAVASTATIN SODIUM 10 MG/1
10 TABLET ORAL NIGHTLY
Refills: 5 | COMMUNITY
Start: 2018-03-16

## 2018-04-02 NOTE — TELEPHONE ENCOUNTER
----- Message from Melany Wheeler sent at 4/2/2018 10:16 AM CDT -----  Contact: self [phone]  She is requesting a new patient appt with you.  She will have her PCP Dr Guilherme Mendez fax a referral to your office.  Please call her to schedule.  Thank you!    LM booked 8-7-18. Call back number provided if there are any questions. INGE

## 2018-04-02 NOTE — PROGRESS NOTES
[facility] RHEUMATOLOGY            PROGRESS NOTE      Subjective:       Patient ID:   NAME: Annita Bettencourt : 1970     47 y.o. female    Referring Doc: No ref. provider found  Other Physicians:    Chief Complaint:  Rheumatoid Arthritis      History of Present Illness:     Patient returns today for a regularly scheduled follow-up visit for    Rheumatoid Arthritis   The patient is doing well  No prolonged morning stiffness or joint swelling. No chest pains ,cough or shortness of breath. No gastrointestinal complaints            ROS:   GEN:  No  fever, night sweats . weight is stable    fatigue  SKIN: no rashes, no bruising, no ulcerations, no Raynaud's  HEENT: no HA's, No visual changes, no mucosal ulcers, no sicca symptoms,  CV:   no CP, SOB, PND, SHI, no orthopnea, no palpitations  PULM: normal with no SOB, cough, hemoptysis, sputum or pleuritic pain  GI:  no abdominal pain, nausea, vomiting, constipation, diarrhea, melanotic stools, BRBPR, hematemesis, no dysphagia  :   no dysuria  NEURO: no paresthesias, headaches, visual disturbances, muscle weakness  MUSCULOSKELETAL:no joint swelling, prolonged AM stiffness, no back pain, no muscle pain  Allergies:  Review of patient's allergies indicates:   Allergen Reactions    Sulfa (sulfonamide antibiotics) Rash       Medications:    Current Outpatient Prescriptions:     amlodipine (NORVASC) 10 MG tablet, Take 10 mg by mouth once daily., Disp: , Rfl:     atenolol (TENORMIN) 25 MG tablet, Take 25 mg by mouth once daily., Disp: , Rfl: 5    esomeprazole magnesium (NEXIUM 24HR) 20 mg TbEC, Take 20 mg by mouth once daily., Disp: , Rfl:     fluoxetine (PROZAC) 40 MG capsule, Take 40 mg by mouth once daily., Disp: , Rfl:     folic acid (FOLVITE) 1 MG tablet, TAKE 1 TABLET BY MOUTH DAILY, Disp: 30 tablet, Rfl: 2    hydroxychloroquine (PLAQUENIL) 200 mg tablet, Take 1 tablet (200 mg total) by mouth once daily., Disp: 60 tablet, Rfl: 3    ibuprofen  (ADVIL,MOTRIN) 800 MG tablet, TAKE 1 TABLET BY MOUTH 2 OR 3 TIMES A DAY AS NEEDED, Disp: 90 tablet, Rfl: 2    losartan (COZAAR) 50 MG tablet, Take 50 mg by mouth once daily., Disp: , Rfl:     metformin (GLUCOPHAGE) 500 MG tablet, Take 500 mg by mouth once daily., Disp: , Rfl:     methotrexate sodium 2.5 mg DsPk, Take by mouth., Disp: , Rfl:     ondansetron (ZOFRAN) 4 MG tablet, 4 mg., Disp: , Rfl:     pravastatin (PRAVACHOL) 10 MG tablet, Take 10 mg by mouth every evening., Disp: , Rfl: 5    predniSONE (DELTASONE) 5 MG tablet, TAKE 1 TABLET BY MOUTH TWO OR THREE TIMES DAILY AFTER MEALS, Disp: 100 tablet, Rfl: 1    tiZANidine (ZANAFLEX) 2 MG tablet, TAKE 1 TABLET BY MOUTH AT BEDTIME, Disp: , Rfl:     traMADol (ULTRAM) 50 mg tablet, TAKE 1 OR 2 TABLETS BY MOUTH Q68H AS NEEDED FOR PAIN, Disp: 180 tablet, Rfl: 2    PMHx/PSHx Update      Objective:     Vitals:  Blood pressure (!) 138/90, weight 107.8 kg (237 lb 11.2 oz).    Physical Examination:   GEN: no apparent distress, comfortable; AAOx3  SKIN: no rashes,no ulceration, no Raynaud's, no petechiae, no SQ nodules,  HEAD: normal  EYES: no pallor, no icterus,  NECK: no masses, thyroid normal, trachea midline, no LAD/LN's, supple  CV: RRR with no murmur; l S1 and S2 reg. ,no gallop no rubs,   CHEST: Normal respiratory effort; CTAB; normal breath sounds; no wheeze or crackles  MUSC/Skeletal: ROM normal; no crepitus; joints without synovitis,  no deformities  No joint swelling or tenderness of PIP, MCP, wrist, elbow, shoulder, or knee joints.Slight tenderness in the  DIP joints  EXTREM: no clubbing, cyanosis, no edema,  NEURO: grossly intact; motor WNL; AAOx3; ,   PSYCH: normal mood, affect and behavior  LYMPH: normal cervical, supraclavicular          Labs:   Lab Results   Component Value Date    WBC 10.8 (H) 01/15/2018    HGB 12.3 01/15/2018    HCT 39.1 01/15/2018    MCV 81.8 01/15/2018     01/15/2018     CMP  @LASTLAB(NA,K,CL,CO2,GLU,BUN,Creatinine,Calcium,PROT,Albumin,Bilitot,Alkphos,AST,ALT,CRP,ESR,RF,CCP,KERLINE,SSA,CPK,uric acid) )@  I have reviewed all available lab results and radiology reports.    Radiology/Diagnostic Studies:        Assessment/Plan:   (1) 47 y.o. female with diagnosis of Rheumatoid arthritis; this is stable    CBC CMP and CRP        Discussion:     I have explained all of the above in detail and the patient understands all of the current recommendation(s). I have answered all questions to the best of my ability and to their complete satisfaction.       The patient is to continue with the current management plan         RTC in   3 months or before if needed      Electronically signed by Santos Presley MD

## 2018-08-07 ENCOUNTER — INITIAL CONSULT (OUTPATIENT)
Dept: RHEUMATOLOGY | Facility: CLINIC | Age: 48
End: 2018-08-07
Payer: COMMERCIAL

## 2018-08-07 ENCOUNTER — HOSPITAL ENCOUNTER (OUTPATIENT)
Dept: RADIOLOGY | Facility: HOSPITAL | Age: 48
Discharge: HOME | End: 2018-08-07
Attending: INTERNAL MEDICINE
Payer: COMMERCIAL

## 2018-08-07 VITALS
HEART RATE: 68 BPM | HEIGHT: 62 IN | SYSTOLIC BLOOD PRESSURE: 108 MMHG | BODY MASS INDEX: 45.26 KG/M2 | DIASTOLIC BLOOD PRESSURE: 70 MMHG | WEIGHT: 245.94 LBS

## 2018-08-07 DIAGNOSIS — M05.741 RHEUMATOID ARTHRITIS INVOLVING BOTH HANDS WITH POSITIVE RHEUMATOID FACTOR: ICD-10-CM

## 2018-08-07 DIAGNOSIS — M05.741 RHEUMATOID ARTHRITIS INVOLVING BOTH HANDS WITH POSITIVE RHEUMATOID FACTOR: Primary | ICD-10-CM

## 2018-08-07 DIAGNOSIS — R53.81 MALAISE AND FATIGUE: ICD-10-CM

## 2018-08-07 DIAGNOSIS — R53.83 MALAISE AND FATIGUE: ICD-10-CM

## 2018-08-07 DIAGNOSIS — M05.742 RHEUMATOID ARTHRITIS INVOLVING BOTH HANDS WITH POSITIVE RHEUMATOID FACTOR: ICD-10-CM

## 2018-08-07 DIAGNOSIS — M25.40 EFFUSION INTO JOINT: ICD-10-CM

## 2018-08-07 DIAGNOSIS — G47.09 OTHER INSOMNIA: ICD-10-CM

## 2018-08-07 DIAGNOSIS — M05.742 RHEUMATOID ARTHRITIS INVOLVING BOTH HANDS WITH POSITIVE RHEUMATOID FACTOR: Primary | ICD-10-CM

## 2018-08-07 PROCEDURE — 99205 OFFICE O/P NEW HI 60 MIN: CPT | Mod: S$GLB,,, | Performed by: INTERNAL MEDICINE

## 2018-08-07 PROCEDURE — 73630 X-RAY EXAM OF FOOT: CPT | Mod: 26,50,, | Performed by: RADIOLOGY

## 2018-08-07 PROCEDURE — 73130 X-RAY EXAM OF HAND: CPT | Mod: 26,50,, | Performed by: RADIOLOGY

## 2018-08-07 PROCEDURE — 3008F BODY MASS INDEX DOCD: CPT | Mod: CPTII,S$GLB,, | Performed by: INTERNAL MEDICINE

## 2018-08-07 PROCEDURE — 73130 X-RAY EXAM OF HAND: CPT | Mod: 50,TC,FY,PO

## 2018-08-07 PROCEDURE — 99999 PR PBB SHADOW E&M-EST. PATIENT-LVL III: CPT | Mod: PBBFAC,,, | Performed by: INTERNAL MEDICINE

## 2018-08-07 PROCEDURE — 73630 X-RAY EXAM OF FOOT: CPT | Mod: 50,TC,FY,PO

## 2018-08-07 RX ORDER — CYCLOBENZAPRINE HCL 10 MG
10 TABLET ORAL NIGHTLY
COMMUNITY
End: 2019-05-13

## 2018-08-07 RX ORDER — HYDROXYCHLOROQUINE SULFATE 200 MG/1
200 TABLET, FILM COATED ORAL DAILY
Qty: 60 TABLET | Refills: 3 | Status: SHIPPED | OUTPATIENT
Start: 2018-08-07 | End: 2018-12-11 | Stop reason: SDUPTHER

## 2018-08-07 RX ORDER — PREDNISONE 5 MG/1
TABLET ORAL
Qty: 100 TABLET | Refills: 1 | Status: SHIPPED | OUTPATIENT
Start: 2018-08-07 | End: 2019-05-13

## 2018-08-07 RX ORDER — FOLIC ACID 1 MG/1
1000 TABLET ORAL DAILY
Qty: 30 TABLET | Refills: 2 | Status: SHIPPED | OUTPATIENT
Start: 2018-08-07 | End: 2019-01-28 | Stop reason: SDUPTHER

## 2018-08-07 RX ORDER — TRAMADOL HYDROCHLORIDE 50 MG/1
TABLET ORAL
Qty: 180 TABLET | Refills: 2 | Status: SHIPPED | OUTPATIENT
Start: 2018-08-07 | End: 2018-12-11

## 2018-08-07 RX ORDER — VITAMIN E (DL,TOCOPHERYL ACET) 180 MG
1 CAPSULE ORAL NIGHTLY
COMMUNITY
End: 2023-02-13

## 2018-08-07 RX ORDER — DOXEPIN HYDROCHLORIDE 10 MG/1
10 CAPSULE ORAL NIGHTLY
Qty: 30 CAPSULE | Refills: 11 | Status: SHIPPED | OUTPATIENT
Start: 2018-08-07 | End: 2019-03-11 | Stop reason: SDUPTHER

## 2018-08-07 NOTE — PATIENT INSTRUCTIONS
Increase Hydroxychloroquine to 200mg twice daily and monitor over the next 8 weeks how your joints are feeling.   Ok for prednisone if flares as you have done in past.     At the 8 week elizabeth shoot a message about how your joints are feeling and we will decide whether to increase Methotrexate.     Dr. BELTRAN

## 2018-08-07 NOTE — PROGRESS NOTES
Imaging of hands without evidence of erosions. This is good for baseline to monitor RA activity and progression over the years.

## 2018-08-07 NOTE — PROGRESS NOTES
Subjective:          Chief Complaint: Annita Bettencourt is a 48 y.o. female who had concerns including Disease Management.    HPI:  Patient is a 48-year-old female being referred by Dr. Mcgarry of her history of rheumatoid arthritis previously followed with Dr. Presley.  She is currently on 12.5 mg once weekly, hydroxychloroquine 200 mg b.i.d. and ibuprofen 800 mg t.i.d. p.r.n..    She has previously been on varying doses of prednisone PRN flares approx 1-2 monthly (50mg, 40mg, 30mg, 20mg and 10mg etc). Flares involve pain such that she could not move her right shoulder this past weekend.   Using Tramadol at least once daily to cover general aches and pains mostly in the hands and hips.   Pain in her feet at the metarsals.   She has wandering oligoarthritis involving hip, jaw. Slight swelling in the PIP joints. Stiffness in wrist at baseline.   +AM stiffness about 40min-1 hr. Joint improve with activity.   The patient was diagnosed 1.5 years ago  Under serologies to review.  She has chronic dry eyes using systane. + dry mouth-  Patient currently has pain in her right shoulder right wrist of rates his pain at 2/10.    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 No MI    Labs last done in April show no evidence of toxicity.      REVIEW OF SYSTEMS:    Review of Systems   Constitutional: Positive for malaise/fatigue. Negative for fever and weight loss.   HENT: Negative for sore throat.    Eyes: Negative for double vision, photophobia and redness.   Respiratory: Negative for cough, shortness of breath and wheezing.    Cardiovascular: Negative for chest pain, palpitations and orthopnea.   Gastrointestinal: Negative for abdominal pain, constipation and diarrhea.   Genitourinary: Negative for dysuria, hematuria and urgency.   Musculoskeletal: Positive for joint pain and myalgias. Negative for back pain.   Skin: Negative for rash.   Neurological: Negative for dizziness, tingling, focal weakness and  headaches.   Endo/Heme/Allergies: Does not bruise/bleed easily.   Psychiatric/Behavioral: Negative for depression, hallucinations and suicidal ideas.               Objective:            Past Medical History:   Diagnosis Date    Acid reflux     Anxiety     Depression     Diabetes mellitus     Enlarged heart     Hypertension     Murmur     Rheumatoid arthritis      Family History   Problem Relation Age of Onset    Hypertension Father      Social History   Substance Use Topics    Smoking status: Current Every Day Smoker     Packs/day: 0.25     Years: 25.00    Smokeless tobacco: Never Used    Alcohol use Yes      Comment: socially         Current Outpatient Prescriptions on File Prior to Visit   Medication Sig Dispense Refill    amlodipine (NORVASC) 10 MG tablet Take 10 mg by mouth once daily.      atenolol (TENORMIN) 25 MG tablet Take 25 mg by mouth once daily.  5    esomeprazole magnesium (NEXIUM 24HR) 20 mg TbEC Take 20 mg by mouth once daily.      folic acid (FOLVITE) 1 MG tablet Take 1 tablet (1,000 mcg total) by mouth once daily. 30 tablet 2    hydroxychloroquine (PLAQUENIL) 200 mg tablet Take 1 tablet (200 mg total) by mouth once daily. 60 tablet 3    ibuprofen (ADVIL,MOTRIN) 800 MG tablet TAKE 1 TABLET BY MOUTH 2 OR 3 TIMES A DAY AS NEEDED 90 tablet 2    losartan (COZAAR) 50 MG tablet Take 50 mg by mouth once daily.      metformin (GLUCOPHAGE) 500 MG tablet Take 500 mg by mouth once daily.      methotrexate sodium 2.5 mg DsPk 12.5 mg once a week 20 tablet 2    pravastatin (PRAVACHOL) 10 MG tablet Take 10 mg by mouth every evening.  5    traMADol (ULTRAM) 50 mg tablet TAKE 1 OR 2 TABLETS BY MOUTH Q68H AS NEEDED FOR PAIN 180 tablet 2    fluoxetine (PROZAC) 40 MG capsule Take 40 mg by mouth once daily.      ondansetron (ZOFRAN) 4 MG tablet 4 mg.      predniSONE (DELTASONE) 5 MG tablet TAKE 1 TABLET BY MOUTH TWO OR THREE TIMES DAILY AFTER MEALS (Patient not taking: Reported on 8/7/2018) 100  tablet 1    [DISCONTINUED] tiZANidine (ZANAFLEX) 2 MG tablet TAKE 1 TABLET BY MOUTH AT BEDTIME       No current facility-administered medications on file prior to visit.        Vitals:    08/07/18 0905   BP: 108/70   Pulse: 68       Physical Exam:    Physical Exam   Constitutional: She is oriented to person, place, and time. She appears well-developed and well-nourished.   HENT:   Head: Normocephalic and atraumatic.   Mouth/Throat: Oropharynx is clear and moist.   Eyes: EOM are normal. Pupils are equal, round, and reactive to light.   Neck: Normal range of motion.   Cardiovascular: Normal rate, regular rhythm and normal heart sounds.   Pulmonary/Chest: Effort normal and breath sounds normal.   Musculoskeletal:        Right shoulder: She exhibits decreased range of motion and tenderness. She exhibits no swelling.        Left shoulder: She exhibits normal range of motion, no tenderness and no swelling.        Right elbow: She exhibits normal range of motion and no swelling. No tenderness found.        Left elbow: She exhibits normal range of motion and no swelling. No tenderness found.        Right wrist: She exhibits decreased range of motion, tenderness and swelling.        Left wrist: She exhibits normal range of motion, no tenderness and no swelling.        Right hip: She exhibits decreased range of motion and tenderness.        Left hip: She exhibits decreased range of motion and tenderness.        Right knee: She exhibits normal range of motion and no swelling. No tenderness found.        Left knee: She exhibits normal range of motion and no swelling. No tenderness found.        Right hand: She exhibits tenderness. She exhibits normal range of motion and no swelling.        Left hand: She exhibits tenderness. She exhibits normal range of motion and no swelling.        Right foot: There is normal range of motion, no tenderness and no swelling.        Left foot: There is normal range of motion, no tenderness and  no swelling.   PIP synovitismild decreased range of motion with  MCPs 2 3 some tenderness no swelling.  Wrist right with swelling decreased extension left wrist full range of motion no active synovitis.   tenderness in the shoulder pain on resisted abduction  Left with full range of motion on painful.   Neurological: She is alert and oriented to person, place, and time.   Skin: Skin is warm and dry.   Psychiatric: She has a normal mood and affect. Her behavior is normal.             Assessment:       Encounter Diagnoses   Name Primary?    Rheumatoid arthritis involving both hands with positive rheumatoid factor Yes    Effusion into joint     Malaise and fatigue     Other insomnia           Plan:        Rheumatoid arthritis involving both hands with positive rheumatoid factor  -     Rheumatoid factor; Future; Expected date: 08/07/2018  -     Cyclic citrul peptide antibody, IgG; Future; Expected date: 08/07/2018  -     Quantiferon Gold TB; Future; Expected date: 08/07/2018  -     X-Ray Hand 3 View Bilateral; Future; Expected date: 08/07/2018  -     X-Ray Foot Complete Bilateral; Future; Expected date: 08/07/2018  -     Sedimentation rate; Future; Expected date: 08/07/2018  -     C-reactive protein; Future; Expected date: 08/07/2018  -     hydroxychloroquine (PLAQUENIL) 200 mg tablet; Take 1 tablet (200 mg total) by mouth once daily.  Dispense: 60 tablet; Refill: 3  -     methotrexate sodium 2.5 mg DsPk; 12.5 mg once a week  Dispense: 20 tablet; Refill: 2  -     folic acid (FOLVITE) 1 MG tablet; Take 1 tablet (1,000 mcg total) by mouth once daily.  Dispense: 30 tablet; Refill: 2  -     traMADol (ULTRAM) 50 mg tablet; TAKE 1 OR 2 TABLETS BY MOUTH Q68H AS NEEDED FOR PAIN  Dispense: 180 tablet; Refill: 2  -     predniSONE (DELTASONE) 5 MG tablet; TAKE 1 TABLET BY MOUTH TWO OR THREE TIMES DAILY AFTER MEALS  Dispense: 100 tablet; Refill: 1    Effusion into joint  -     Hepatitis panel, acute; Future; Expected date:  08/07/2018    Malaise and fatigue  -     Hepatitis panel, acute; Future; Expected date: 08/07/2018    Other insomnia  -     doxepin (SINEQUAN) 10 MG capsule; Take 1 capsule (10 mg total) by mouth every evening.  Dispense: 30 capsule; Refill: 11     Patient is a 48-year-old female with a history of rheumatoid arthritis managed on methotrexate and hydroxychloroquine 200 mg once daily.  For now I want her to increase her hydroxychloroquine to 200 mg b.i.d. continue methotrexate at 12.5 mg weekly.  I want her to get established for pre biologic labs in the event that we may consider using a biologic in the next 6 months.   -persistent synovitis despite DMARD therapy suboptimal control of RA.  Increase Hydroxychloroquine to 200mg twice daily and monitor over the next 8 weeks how your joints are feeling.   Ok for prednisone if flares as you have done in past.     At the 8 week elizabeth shoot a message about how your joints are feeling and we will decide whether to increase Methotrexate.     Follow-up in about 4 months (around 12/7/2018).      60min consultation with greater than 50% spent in counseling, chart review and coordination of care. All questions answered.

## 2018-08-09 ENCOUNTER — PATIENT MESSAGE (OUTPATIENT)
Dept: RHEUMATOLOGY | Facility: CLINIC | Age: 48
End: 2018-08-09

## 2018-09-06 ENCOUNTER — PATIENT MESSAGE (OUTPATIENT)
Dept: RHEUMATOLOGY | Facility: CLINIC | Age: 48
End: 2018-09-06

## 2018-09-06 DIAGNOSIS — M05.742 RHEUMATOID ARTHRITIS INVOLVING BOTH HANDS WITH POSITIVE RHEUMATOID FACTOR: ICD-10-CM

## 2018-09-06 DIAGNOSIS — M05.741 RHEUMATOID ARTHRITIS INVOLVING BOTH HANDS WITH POSITIVE RHEUMATOID FACTOR: ICD-10-CM

## 2018-09-10 RX ORDER — METHOTREXATE 2.5 MG/1
17.5 TABLET ORAL
Qty: 28 TABLET | Refills: 3 | Status: SHIPPED | OUTPATIENT
Start: 2018-09-10 | End: 2018-12-11 | Stop reason: SDUPTHER

## 2018-09-10 NOTE — TELEPHONE ENCOUNTER
We are 4 weeks out from starting HCQ.   Want to increase Methotrexate to 7 tabs weekly    If she is taking 50mg prednisone I would do this for 5 days in a row then taper down.   If not improved will want to start biologic such as Humira/Enbrel.   I can inject the shoulder if needed as well.     DR BELTRAN

## 2018-09-11 ENCOUNTER — PATIENT MESSAGE (OUTPATIENT)
Dept: RHEUMATOLOGY | Facility: CLINIC | Age: 48
End: 2018-09-11

## 2018-12-11 ENCOUNTER — LAB VISIT (OUTPATIENT)
Dept: LAB | Facility: HOSPITAL | Age: 48
End: 2018-12-11
Attending: INTERNAL MEDICINE
Payer: COMMERCIAL

## 2018-12-11 ENCOUNTER — OFFICE VISIT (OUTPATIENT)
Dept: RHEUMATOLOGY | Facility: CLINIC | Age: 48
End: 2018-12-11
Payer: COMMERCIAL

## 2018-12-11 VITALS
BODY MASS INDEX: 45.11 KG/M2 | WEIGHT: 245.13 LBS | HEIGHT: 62 IN | DIASTOLIC BLOOD PRESSURE: 70 MMHG | SYSTOLIC BLOOD PRESSURE: 108 MMHG | HEART RATE: 79 BPM

## 2018-12-11 DIAGNOSIS — M05.741 RHEUMATOID ARTHRITIS INVOLVING BOTH HANDS WITH POSITIVE RHEUMATOID FACTOR: ICD-10-CM

## 2018-12-11 DIAGNOSIS — M05.741 RHEUMATOID ARTHRITIS INVOLVING BOTH HANDS WITH POSITIVE RHEUMATOID FACTOR: Primary | ICD-10-CM

## 2018-12-11 DIAGNOSIS — Z79.899 HIGH RISK MEDICATIONS (NOT ANTICOAGULANTS) LONG-TERM USE: ICD-10-CM

## 2018-12-11 DIAGNOSIS — M05.742 RHEUMATOID ARTHRITIS INVOLVING BOTH HANDS WITH POSITIVE RHEUMATOID FACTOR: Primary | ICD-10-CM

## 2018-12-11 DIAGNOSIS — M05.742 RHEUMATOID ARTHRITIS INVOLVING BOTH HANDS WITH POSITIVE RHEUMATOID FACTOR: ICD-10-CM

## 2018-12-11 LAB
ALBUMIN SERPL BCP-MCNC: 3.4 G/DL
ALP SERPL-CCNC: 80 U/L
ALT SERPL W/O P-5'-P-CCNC: 15 U/L
ANION GAP SERPL CALC-SCNC: 8 MMOL/L
AST SERPL-CCNC: 12 U/L
BASOPHILS # BLD AUTO: 0.1 K/UL
BASOPHILS NFR BLD: 0.7 %
BILIRUB SERPL-MCNC: 0.3 MG/DL
BUN SERPL-MCNC: 19 MG/DL
CALCIUM SERPL-MCNC: 9.3 MG/DL
CHLORIDE SERPL-SCNC: 103 MMOL/L
CO2 SERPL-SCNC: 30 MMOL/L
CREAT SERPL-MCNC: 0.8 MG/DL
CRP SERPL-MCNC: 6.5 MG/L
DIFFERENTIAL METHOD: ABNORMAL
EOSINOPHIL # BLD AUTO: 0.4 K/UL
EOSINOPHIL NFR BLD: 3 %
ERYTHROCYTE [DISTWIDTH] IN BLOOD BY AUTOMATED COUNT: 17 %
ERYTHROCYTE [SEDIMENTATION RATE] IN BLOOD BY WESTERGREN METHOD: 14 MM/HR
EST. GFR  (AFRICAN AMERICAN): >60 ML/MIN/1.73 M^2
EST. GFR  (NON AFRICAN AMERICAN): >60 ML/MIN/1.73 M^2
GLUCOSE SERPL-MCNC: 94 MG/DL
HCT VFR BLD AUTO: 39.3 %
HGB BLD-MCNC: 12 G/DL
IMM GRANULOCYTES # BLD AUTO: 0.13 K/UL
IMM GRANULOCYTES NFR BLD AUTO: 1 %
LYMPHOCYTES # BLD AUTO: 2.8 K/UL
LYMPHOCYTES NFR BLD: 20.4 %
MCH RBC QN AUTO: 24.7 PG
MCHC RBC AUTO-ENTMCNC: 30.5 G/DL
MCV RBC AUTO: 81 FL
MONOCYTES # BLD AUTO: 0.8 K/UL
MONOCYTES NFR BLD: 5.6 %
NEUTROPHILS # BLD AUTO: 9.3 K/UL
NEUTROPHILS NFR BLD: 69.3 %
NRBC BLD-RTO: 0 /100 WBC
PLATELET # BLD AUTO: 406 K/UL
PMV BLD AUTO: 11.4 FL
POTASSIUM SERPL-SCNC: 4.6 MMOL/L
PROT SERPL-MCNC: 6.8 G/DL
RBC # BLD AUTO: 4.86 M/UL
SODIUM SERPL-SCNC: 141 MMOL/L
WBC # BLD AUTO: 13.45 K/UL

## 2018-12-11 PROCEDURE — 85025 COMPLETE CBC W/AUTO DIFF WBC: CPT

## 2018-12-11 PROCEDURE — 86140 C-REACTIVE PROTEIN: CPT

## 2018-12-11 PROCEDURE — 99214 OFFICE O/P EST MOD 30 MIN: CPT | Mod: S$GLB,,, | Performed by: INTERNAL MEDICINE

## 2018-12-11 PROCEDURE — 85651 RBC SED RATE NONAUTOMATED: CPT | Mod: PO

## 2018-12-11 PROCEDURE — 3008F BODY MASS INDEX DOCD: CPT | Mod: CPTII,S$GLB,, | Performed by: INTERNAL MEDICINE

## 2018-12-11 PROCEDURE — 80053 COMPREHEN METABOLIC PANEL: CPT

## 2018-12-11 PROCEDURE — 36415 COLL VENOUS BLD VENIPUNCTURE: CPT | Mod: PO

## 2018-12-11 PROCEDURE — 99999 PR PBB SHADOW E&M-EST. PATIENT-LVL III: CPT | Mod: PBBFAC,,, | Performed by: INTERNAL MEDICINE

## 2018-12-11 RX ORDER — ADALIMUMAB 40MG/0.8ML
40 KIT SUBCUTANEOUS
Qty: 2 PEN | Refills: 11 | Status: SHIPPED | OUTPATIENT
Start: 2018-12-11 | End: 2019-12-28 | Stop reason: SDUPTHER

## 2018-12-11 RX ORDER — METHOTREXATE 2.5 MG/1
17.5 TABLET ORAL
Qty: 28 TABLET | Refills: 3 | Status: SHIPPED | OUTPATIENT
Start: 2018-12-11 | End: 2019-01-10

## 2018-12-11 RX ORDER — HYDROCODONE BITARTRATE AND ACETAMINOPHEN 5; 325 MG/1; MG/1
1 TABLET ORAL EVERY 6 HOURS PRN
Qty: 30 TABLET | Refills: 0 | Status: SHIPPED | OUTPATIENT
Start: 2018-12-11 | End: 2019-04-09 | Stop reason: SDUPTHER

## 2018-12-11 RX ORDER — MINOCYCLINE HYDROCHLORIDE 100 MG/1
100 CAPSULE ORAL
COMMUNITY
Start: 2018-12-05 | End: 2018-12-15

## 2018-12-11 RX ORDER — HYDROXYCHLOROQUINE SULFATE 200 MG/1
200 TABLET, FILM COATED ORAL 2 TIMES DAILY
Qty: 60 TABLET | Refills: 3 | Status: SHIPPED | OUTPATIENT
Start: 2018-12-11 | End: 2019-03-11 | Stop reason: SDUPTHER

## 2018-12-11 NOTE — PROGRESS NOTES
Subjective:          Chief Complaint: Annita Bettencourt is a 48 y.o. female who had concerns including Disease Management.    HPI:  Patient is a 48-year-old female here for consultation f/u sero+ high titer RA    She is currently on MTX  178.5 mg once weekly   hydroxychloroquine 200 mgBID (since consultation as previously on once daily)  . and ibuprofen 800 mg t.i.d. p.r.n..    She has previously been on varying doses of prednisone PRN flares approx 1-2 monthly (50mg, 40mg, 30mg, 20mg and 10mg etc).   Flares involve pain such that she could not move her right shoulder      Using Tramadol at least once daily to cover general aches and pains mostly in the hands and hips. Hands and wrist have been increased with stiffness and are numb in morning when she awakens. Resolves quickly. Does not have numbness during day.   2 falres since last visit needing Prenisone.   No==  +AM stiffness about 40min-1 hr. Joint improve with activity.   The patient was diagnosed 1.5 years ago  Under serologies to review.  She has chronic dry eyes using systane. + dry mouth-  Patient currently has pain in her right shoulder right wrist of rates his pain at 2/10.    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 No MI      Component      Latest Ref Rng & Units 8/7/2018   NIL      See text IU/mL 0.050   TB1 - Nil      See text IU/mL -0.030   TB2 - Nil      See text IU/mL -0.030   Mitogen - Nil      See text IU/mL 8.140   TB Gold Plus       Negative   Hepatitis B Surface Ag       Negative   Hep B C IgM       Negative   Hep A IgM       Negative   Hepatitis C Ab       Negative   Rheumatoid Factor      0.0 - 15.0 IU/mL 397.0 (H)   CCP Antibodies      <5.0 U/mL 160.5 (H)   Sed Rate      0 - 20 mm/Hr 9   CRP      0.0 - 8.2 mg/L 1.9       REVIEW OF SYSTEMS:    Review of Systems   Constitutional: Positive for malaise/fatigue. Negative for fever and weight loss.   HENT: Negative for sore throat.    Eyes: Negative for double vision,  photophobia and redness.   Respiratory: Negative for cough, shortness of breath and wheezing.    Cardiovascular: Negative for chest pain, palpitations and orthopnea.   Gastrointestinal: Negative for abdominal pain, constipation and diarrhea.   Genitourinary: Negative for dysuria, hematuria and urgency.   Musculoskeletal: Positive for joint pain and myalgias. Negative for back pain.   Skin: Negative for rash.   Neurological: Negative for dizziness, tingling, focal weakness and headaches.   Endo/Heme/Allergies: Does not bruise/bleed easily.   Psychiatric/Behavioral: Negative for depression, hallucinations and suicidal ideas.               Objective:            Past Medical History:   Diagnosis Date    Acid reflux     Anxiety     Depression     Diabetes mellitus     Enlarged heart     Hypertension     Murmur     Rheumatoid arthritis      Family History   Problem Relation Age of Onset    Hypertension Father      Social History     Tobacco Use    Smoking status: Current Every Day Smoker     Packs/day: 0.25     Years: 25.00     Pack years: 6.25    Smokeless tobacco: Never Used   Substance Use Topics    Alcohol use: Yes     Comment: socially    Drug use: No         Current Outpatient Medications on File Prior to Visit   Medication Sig Dispense Refill    amlodipine (NORVASC) 10 MG tablet Take 10 mg by mouth once daily.      atenolol (TENORMIN) 25 MG tablet Take 25 mg by mouth once daily.  5    doxepin (SINEQUAN) 10 MG capsule Take 1 capsule (10 mg total) by mouth every evening. 30 capsule 11    esomeprazole magnesium (NEXIUM 24HR) 20 mg TbEC Take 20 mg by mouth once daily.      fluoxetine (PROZAC) 40 MG capsule Take 40 mg by mouth once daily.      folic acid (FOLVITE) 1 MG tablet Take 1 tablet (1,000 mcg total) by mouth once daily. 30 tablet 2    hydroxychloroquine (PLAQUENIL) 200 mg tablet Take 1 tablet (200 mg total) by mouth once daily. 60 tablet 3    losartan (COZAAR) 50 MG tablet Take 50 mg by  mouth once daily.      metformin (GLUCOPHAGE) 500 MG tablet Take 500 mg by mouth once daily.      minocycline (MINOCIN,DYNACIN) 100 MG capsule 100 mg.      pravastatin (PRAVACHOL) 10 MG tablet Take 10 mg by mouth every evening.  5    traMADol (ULTRAM) 50 mg tablet TAKE 1 OR 2 TABLETS BY MOUTH Q68H AS NEEDED FOR PAIN 180 tablet 2    turmeric/turmeric ext/pepr ext (TURMERIC-TURMERIC EXT-PEPPER) 500-3 mg Cap Take 1 capsule by mouth every evening.      cyclobenzaprine (FLEXERIL) 10 MG tablet Take 10 mg by mouth every evening.      ibuprofen (ADVIL,MOTRIN) 800 MG tablet TAKE 1 TABLET BY MOUTH 2 OR 3 TIMES A DAY AS NEEDED 90 tablet 2    methotrexate 2.5 MG Tab Take 7 tablets (17.5 mg total) by mouth every 7 days. 28 tablet 3    ondansetron (ZOFRAN) 4 MG tablet 4 mg.      predniSONE (DELTASONE) 5 MG tablet TAKE 1 TABLET BY MOUTH TWO OR THREE TIMES DAILY AFTER MEALS 100 tablet 1     No current facility-administered medications on file prior to visit.        Vitals:    12/11/18 0959   BP: 108/70   Pulse: 79       Physical Exam:    Physical Exam   Constitutional: She is oriented to person, place, and time. She appears well-developed and well-nourished.   HENT:   Head: Normocephalic and atraumatic.   Mouth/Throat: Oropharynx is clear and moist.   Eyes: EOM are normal. Pupils are equal, round, and reactive to light.   Neck: Normal range of motion.   Cardiovascular: Normal rate, regular rhythm and normal heart sounds.   Pulmonary/Chest: Effort normal and breath sounds normal.   Musculoskeletal:        Right shoulder: She exhibits decreased range of motion and tenderness. She exhibits no swelling.        Left shoulder: She exhibits normal range of motion, no tenderness and no swelling.        Right elbow: She exhibits normal range of motion and no swelling. No tenderness found.        Left elbow: She exhibits normal range of motion and no swelling. No tenderness found.        Right wrist: She exhibits decreased range of  motion, tenderness and swelling.        Left wrist: She exhibits normal range of motion, no tenderness and no swelling.        Right hip: She exhibits decreased range of motion and tenderness.        Left hip: She exhibits decreased range of motion and tenderness.        Right knee: She exhibits normal range of motion and no swelling. No tenderness found.        Left knee: She exhibits normal range of motion and no swelling. No tenderness found.        Right hand: She exhibits tenderness. She exhibits normal range of motion and no swelling.        Left hand: She exhibits tenderness. She exhibits normal range of motion and no swelling.        Right foot: There is normal range of motion, no tenderness and no swelling.        Left foot: There is normal range of motion, no tenderness and no swelling.   PIP synovitismild decreased range of motion with  MCPs 2 3 some tenderness no swelling.  Wrist right with swelling decreased extension left wrist full range of motion no active synovitis.   tenderness in the shoulder pain on resisted abduction  Left with full range of motion on painful.   Neurological: She is alert and oriented to person, place, and time.   Skin: Skin is warm and dry.   Psychiatric: She has a normal mood and affect. Her behavior is normal.             Assessment:       Encounter Diagnoses   Name Primary?    Rheumatoid arthritis involving both hands with positive rheumatoid factor Yes    High risk medications (not anticoagulants) long-term use           Plan:        Rheumatoid arthritis involving both hands with positive rheumatoid factor  -     hydroxychloroquine (PLAQUENIL) 200 mg tablet; Take 1 tablet (200 mg total) by mouth 2 (two) times daily.  Dispense: 60 tablet; Refill: 3  -     CBC auto differential; Standing; Expected date: 12/11/2018  -     Comprehensive metabolic panel; Standing; Expected date: 12/11/2018  -     Sedimentation rate; Standing; Expected date: 12/11/2018  -     C-reactive  protein; Standing; Expected date: 12/11/2018    High risk medications (not anticoagulants) long-term use  -     CBC auto differential; Standing; Expected date: 12/11/2018  -     Comprehensive metabolic panel; Standing; Expected date: 12/11/2018  -     Sedimentation rate; Standing; Expected date: 12/11/2018  -     C-reactive protein; Standing; Expected date: 12/11/2018    Other orders  -     methotrexate 2.5 MG Tab; Take 7 tablets (17.5 mg total) by mouth every 7 days.  Dispense: 28 tablet; Refill: 3  -     HYDROcodone-acetaminophen (NORCO) 5-325 mg per tablet; Take 1 tablet by mouth every 6 (six) hours as needed for Pain.  Dispense: 30 tablet; Refill: 0  -     adalimumab (HUMIRA PEN) 40 mg/0.8 mL PnKt; Inject 0.8 mLs (40 mg total) into the skin every 14 (fourteen) days.  Dispense: 2 pen; Refill: 11         Patient is a 48-year-old female with a history of rheumatoid arthritis managed on methotrexate and hydroxychloroquine 200 mg once daily.  For now I want her to increase her hydroxychloroquine to 200 mg b.i.d. continue methotrexate at 17.5 mg weekly.  I want her to get established for pre biologic labs in the event that we may consider using a biologic in the next 6 months.   -persistent synovitis despite DMARD therapy suboptimal control of RA.  Increase Hydroxychloroquine to 200mg twice daily and monitor over the next 8 weeks how your joints are feeling.   Ok for prednisone if flares as you have done in past.   Persistent synovitis, pain and stiffness despite MTX and HCQ  Start Humira.     Patient is aware of the risks benefits side effects and monitoring requirements of biologic therapy including but not limited to disseminated tuberculosis recurrent serious infections suppression of the immune system, injection site reactions.  F/u 4 months.   No Follow-up on file.      3  0min consultation with greater than 50% spent in counseling, chart review and coordination of care. All questions answered.

## 2018-12-12 ENCOUNTER — TELEPHONE (OUTPATIENT)
Dept: PHARMACY | Facility: CLINIC | Age: 48
End: 2018-12-12

## 2018-12-20 ENCOUNTER — TELEPHONE (OUTPATIENT)
Dept: PHARMACY | Facility: CLINIC | Age: 48
End: 2018-12-20

## 2018-12-20 NOTE — TELEPHONE ENCOUNTER
DOCUMENTATION ONLY:  Prior authorization for Humira approved from 12/12/18 to 6/11/19    Case Id: [id number]    Co-pay: $80.00, e-voucher brings down to $5.00    Patient Assistance is not required.

## 2018-12-20 NOTE — TELEPHONE ENCOUNTER
Initial Humira 40mg/0.8ml Pen Injection consult completed on . Humira 40mg/0.8ml Pen Injection will be shipped on  to arrive at patient's home on  via [employer]. $5 copay. Patient will start Humira 40mg/0.8ml Pen Injection on . Address confirmed, CC on file. Confirmed 2 patient identifiers - name and . Therapy Appropriate.    Counseled patient on administration directions:  -  Inject Humira 40mg (1pen) into the skin every 14 days.- Take out of the refrigerator 30-60 minutes prior to injection.  - Wash hands before and after injection.  - Monthly RX will come with gauze, bandaids, and alcohol swabs.  - Patient may inject in either the tops of the thighs, abdomen- but at least 2 inches away from her belly button, or the outer part of her upper arm.  Patient was instructed to rotate injections sites.  - Patient is to wipe down the injection site with the alcohol pad, wait to dry.  Gently squeeze the area of the cleaned skin and hold it firmly.   Place the pen flat against the raised area of skin that is being squeezed, then push down on the button and hold for 10-15 seconds, until the window has gone from clear to yellow.  - Patient should rotate injection sites.   - Patient will use sharps container; once full, per [facility] hall, she may lock the sharps container and place in her trash. She can then contact the Pharmacy and we will replace the sharps at no additional charge.    Patient was counseled on possible side effects:  - Injection site reaction: redness, soreness, itching, bruising, which should resolve within 3-5 days.   - Increased risk for infections. If patient becomes sick, patient is to hold Humira  use until she is better.     DDIs:  Medication list reviewed and potential DDIs addressed.    Patient verbalized understanding. Compliance stressed. Patient advised to keep a calendar marking dates of injections to ensure better compliance. Patient advised to call myself or provider should any  questions arise. Patient plans to start Humira on 12/22. Consultation included: indication; goals of treatment; administration; storage and handling; side effects; how to handle side effects; the importance of compliance; how to handle missed doses; the importance of laboratory monitoring; the importance of keeping all follow up appointments.  Patient understands to report any medication changes to [facility] and provider. All questions answered and addressed to patients satisfaction. I will f/u with her in 1 week from start, [facility] to contact patient in 3 weeks for refills.

## 2019-01-04 ENCOUNTER — PATIENT MESSAGE (OUTPATIENT)
Dept: RHEUMATOLOGY | Facility: CLINIC | Age: 49
End: 2019-01-04

## 2019-01-10 ENCOUNTER — PATIENT MESSAGE (OUTPATIENT)
Dept: PHARMACY | Facility: CLINIC | Age: 49
End: 2019-01-10

## 2019-01-11 ENCOUNTER — TELEPHONE (OUTPATIENT)
Dept: PHARMACY | Facility: CLINIC | Age: 49
End: 2019-01-11

## 2019-01-11 RX ORDER — ADALIMUMAB 40MG/0.8ML
40 KIT SUBCUTANEOUS
Qty: 2 PEN | Refills: 11 | Status: CANCELLED | OUTPATIENT
Start: 2019-01-11 | End: 2019-02-10

## 2019-01-11 NOTE — TELEPHONE ENCOUNTER
----- Message from Ira Das sent at 1/11/2019 12:42 PM CST -----  Contact: Quentin Saavedra with [employer] - [phone] is calling for a refill on Humira 40mg for 1 pen replacement of 1 broken injection

## 2019-01-13 ENCOUNTER — TELEPHONE (OUTPATIENT)
Dept: RHEUMATOLOGY | Facility: CLINIC | Age: 49
End: 2019-01-13

## 2019-01-13 RX ORDER — METHOTREXATE 2.5 MG/1
25 TABLET ORAL
Qty: 40 TABLET | Refills: 3 | Status: SHIPPED | OUTPATIENT
Start: 2019-01-13 | End: 2019-08-06 | Stop reason: SDUPTHER

## 2019-01-14 NOTE — TELEPHONE ENCOUNTER
----- Message from Crystal Constantino PharmD sent at 1/11/2019  1:35 PM CST -----  Regarding: Humira replacement  Good afternoon Dr. Schwab and staff,    I spoke with Ms. Bettencourt last Friday regarding her Humira start. She had a misfire with first pen and all medication went on the floor but completed first dose with second pen on 12/29. I gave her the number for product replacement that day and then we were not able to reach her again until today.    I spoke with Humira complete and they said a one time replacement can be sent to patient if someone from your office can call a one time verbal order [phone]. I am not sure they can get her one by tomorrow, but we also won't be able to ship until Monday for Tuesday.    Please let me know how I can be of further assistance.    Thank you,  Crystal Constantino, PharmD  [facility]  [phone]

## 2019-01-14 NOTE — TELEPHONE ENCOUNTER
Called pharmacy to give one time verbal to replace defective pen. They will reach out to patient to schedule delivery.

## 2019-01-28 DIAGNOSIS — M05.742 RHEUMATOID ARTHRITIS INVOLVING BOTH HANDS WITH POSITIVE RHEUMATOID FACTOR: ICD-10-CM

## 2019-01-28 DIAGNOSIS — M05.741 RHEUMATOID ARTHRITIS INVOLVING BOTH HANDS WITH POSITIVE RHEUMATOID FACTOR: ICD-10-CM

## 2019-01-30 RX ORDER — FOLIC ACID 1 MG/1
TABLET ORAL
Qty: 30 TABLET | Refills: 2 | Status: SHIPPED | OUTPATIENT
Start: 2019-01-30 | End: 2019-03-11 | Stop reason: SDUPTHER

## 2019-02-03 RX ORDER — CYCLOBENZAPRINE HCL 10 MG
TABLET ORAL
Qty: 30 TABLET | Refills: 3 | Status: SHIPPED | OUTPATIENT
Start: 2019-02-03 | End: 2019-05-13

## 2019-02-05 ENCOUNTER — TELEPHONE (OUTPATIENT)
Dept: PHARMACY | Facility: CLINIC | Age: 49
End: 2019-02-05

## 2019-02-08 DIAGNOSIS — M05.742 RHEUMATOID ARTHRITIS INVOLVING BOTH HANDS WITH POSITIVE RHEUMATOID FACTOR: ICD-10-CM

## 2019-02-08 DIAGNOSIS — M05.741 RHEUMATOID ARTHRITIS INVOLVING BOTH HANDS WITH POSITIVE RHEUMATOID FACTOR: ICD-10-CM

## 2019-02-10 ENCOUNTER — PATIENT MESSAGE (OUTPATIENT)
Dept: RHEUMATOLOGY | Facility: CLINIC | Age: 49
End: 2019-02-10

## 2019-02-11 ENCOUNTER — PATIENT MESSAGE (OUTPATIENT)
Dept: RHEUMATOLOGY | Facility: CLINIC | Age: 49
End: 2019-02-11

## 2019-02-11 RX ORDER — TRAMADOL HYDROCHLORIDE 50 MG/1
TABLET ORAL
Qty: 180 TABLET | Refills: 0 | Status: SHIPPED | OUTPATIENT
Start: 2019-02-11 | End: 2019-05-13 | Stop reason: SDUPTHER

## 2019-02-13 RX ORDER — TRAMADOL HYDROCHLORIDE 50 MG/1
50 TABLET ORAL EVERY 6 HOURS
OUTPATIENT
Start: 2019-02-13

## 2019-02-19 ENCOUNTER — TELEPHONE (OUTPATIENT)
Dept: PHARMACY | Facility: CLINIC | Age: 49
End: 2019-02-19

## 2019-03-09 ENCOUNTER — LAB VISIT (OUTPATIENT)
Dept: LAB | Facility: HOSPITAL | Age: 49
End: 2019-03-09
Attending: INTERNAL MEDICINE
Payer: COMMERCIAL

## 2019-03-09 DIAGNOSIS — M05.742 RHEUMATOID ARTHRITIS INVOLVING BOTH HANDS WITH POSITIVE RHEUMATOID FACTOR: ICD-10-CM

## 2019-03-09 DIAGNOSIS — Z79.899 HIGH RISK MEDICATIONS (NOT ANTICOAGULANTS) LONG-TERM USE: ICD-10-CM

## 2019-03-09 DIAGNOSIS — M05.741 RHEUMATOID ARTHRITIS INVOLVING BOTH HANDS WITH POSITIVE RHEUMATOID FACTOR: ICD-10-CM

## 2019-03-09 LAB
ALBUMIN SERPL BCP-MCNC: 3.6 G/DL
ALP SERPL-CCNC: 64 U/L
ALT SERPL W/O P-5'-P-CCNC: 24 U/L
ANION GAP SERPL CALC-SCNC: 7 MMOL/L
AST SERPL-CCNC: 14 U/L
BASOPHILS # BLD AUTO: 0.06 K/UL
BASOPHILS NFR BLD: 0.9 %
BILIRUB SERPL-MCNC: 0.3 MG/DL
BUN SERPL-MCNC: 17 MG/DL
CALCIUM SERPL-MCNC: 9.7 MG/DL
CHLORIDE SERPL-SCNC: 106 MMOL/L
CO2 SERPL-SCNC: 27 MMOL/L
CREAT SERPL-MCNC: 0.8 MG/DL
CRP SERPL-MCNC: 6.2 MG/L
DIFFERENTIAL METHOD: ABNORMAL
EOSINOPHIL # BLD AUTO: 0.3 K/UL
EOSINOPHIL NFR BLD: 4.8 %
ERYTHROCYTE [DISTWIDTH] IN BLOOD BY AUTOMATED COUNT: 18.3 %
ERYTHROCYTE [SEDIMENTATION RATE] IN BLOOD BY WESTERGREN METHOD: 9 MM/HR
EST. GFR  (AFRICAN AMERICAN): >60 ML/MIN/1.73 M^2
EST. GFR  (NON AFRICAN AMERICAN): >60 ML/MIN/1.73 M^2
GLUCOSE SERPL-MCNC: 99 MG/DL
HCT VFR BLD AUTO: 37.6 %
HGB BLD-MCNC: 11.5 G/DL
IMM GRANULOCYTES # BLD AUTO: 0.05 K/UL
IMM GRANULOCYTES NFR BLD AUTO: 0.8 %
LYMPHOCYTES # BLD AUTO: 1.6 K/UL
LYMPHOCYTES NFR BLD: 25.2 %
MCH RBC QN AUTO: 25.9 PG
MCHC RBC AUTO-ENTMCNC: 30.6 G/DL
MCV RBC AUTO: 85 FL
MONOCYTES # BLD AUTO: 0.6 K/UL
MONOCYTES NFR BLD: 9.8 %
NEUTROPHILS # BLD AUTO: 3.8 K/UL
NEUTROPHILS NFR BLD: 58.5 %
NRBC BLD-RTO: 0 /100 WBC
PLATELET # BLD AUTO: 303 K/UL
PMV BLD AUTO: 11.4 FL
POTASSIUM SERPL-SCNC: 4.4 MMOL/L
PROT SERPL-MCNC: 6.6 G/DL
RBC # BLD AUTO: 4.44 M/UL
SODIUM SERPL-SCNC: 140 MMOL/L
WBC # BLD AUTO: 6.51 K/UL

## 2019-03-09 PROCEDURE — 80053 COMPREHEN METABOLIC PANEL: CPT

## 2019-03-09 PROCEDURE — 86140 C-REACTIVE PROTEIN: CPT

## 2019-03-09 PROCEDURE — 85652 RBC SED RATE AUTOMATED: CPT

## 2019-03-09 PROCEDURE — 36415 COLL VENOUS BLD VENIPUNCTURE: CPT | Mod: PO

## 2019-03-09 PROCEDURE — 85025 COMPLETE CBC W/AUTO DIFF WBC: CPT

## 2019-03-11 DIAGNOSIS — M05.742 RHEUMATOID ARTHRITIS INVOLVING BOTH HANDS WITH POSITIVE RHEUMATOID FACTOR: ICD-10-CM

## 2019-03-11 DIAGNOSIS — M05.741 RHEUMATOID ARTHRITIS INVOLVING BOTH HANDS WITH POSITIVE RHEUMATOID FACTOR: ICD-10-CM

## 2019-03-11 DIAGNOSIS — G47.09 OTHER INSOMNIA: ICD-10-CM

## 2019-03-13 ENCOUNTER — PATIENT MESSAGE (OUTPATIENT)
Dept: RHEUMATOLOGY | Facility: CLINIC | Age: 49
End: 2019-03-13

## 2019-03-13 RX ORDER — HYDROXYCHLOROQUINE SULFATE 200 MG/1
200 TABLET, FILM COATED ORAL 2 TIMES DAILY
Qty: 60 TABLET | Refills: 3 | Status: SHIPPED | OUTPATIENT
Start: 2019-03-13 | End: 2019-08-31 | Stop reason: SDUPTHER

## 2019-03-13 RX ORDER — DOXEPIN HYDROCHLORIDE 10 MG/1
10 CAPSULE ORAL NIGHTLY
Qty: 30 CAPSULE | Refills: 11 | Status: SHIPPED | OUTPATIENT
Start: 2019-03-13 | End: 2019-10-16

## 2019-03-13 RX ORDER — FOLIC ACID 1 MG/1
1000 TABLET ORAL DAILY
Qty: 30 TABLET | Refills: 2 | Status: SHIPPED | OUTPATIENT
Start: 2019-03-13 | End: 2020-02-18 | Stop reason: SDUPTHER

## 2019-03-21 ENCOUNTER — TELEPHONE (OUTPATIENT)
Dept: PHARMACY | Facility: CLINIC | Age: 49
End: 2019-03-21

## 2019-03-25 NOTE — TELEPHONE ENCOUNTER
The patient returned [facility]'s call in regards to Humira refill/follow up. Confirmed she injects Humira every 14 days. She stated she still has one more pen remaining for this Wednesday's injection then will not need a refill until 4/10. Informed her we will call her back one week prior to needing a refill and encouraged her to contact [facility] with any questions/concerns in the meantime.

## 2019-04-09 NOTE — TELEPHONE ENCOUNTER
Refill request forwarded to Dr. Schwab. Patient last filled 12/11/2019. Patient last seen 12/11/2019

## 2019-04-11 ENCOUNTER — TELEPHONE (OUTPATIENT)
Dept: PHARMACY | Facility: CLINIC | Age: 49
End: 2019-04-11

## 2019-04-11 RX ORDER — HYDROCODONE BITARTRATE AND ACETAMINOPHEN 5; 325 MG/1; MG/1
1 TABLET ORAL EVERY 8 HOURS PRN
Qty: 12 TABLET | Refills: 0 | Status: SHIPPED | OUTPATIENT
Start: 2019-04-11 | End: 2019-10-16 | Stop reason: SDUPTHER

## 2019-04-11 NOTE — TELEPHONE ENCOUNTER
Pt confirmed shipping of Humira on  to arrive on . Address and  verified. $5 copay in 004, [facility]. Pt needs a new sharps container. Pt has 0 doses on hand, next dose due today. Pt will inject tomorrow when she receives her medication. Pt reported no missed doses. Pt did not start any new medications. Pt had no further questions or concerns.

## 2019-04-11 NOTE — TELEPHONE ENCOUNTER
Patient is on Tramadol regularly. The hydrocodone was for one time short use prior to starting Humira  Is her tramadol not working?   What is the exact pain prompting need for Hydrocodone: back, hands, knees feet???  I do not recommend Hydrocodone and Tramadol .   I will give her 4 days worth of Hydrocodone but after that we would have to do a new assessment of the problem    Dr. Schwab

## 2019-05-03 ENCOUNTER — TELEPHONE (OUTPATIENT)
Dept: PHARMACY | Facility: CLINIC | Age: 49
End: 2019-05-03

## 2019-05-13 ENCOUNTER — OFFICE VISIT (OUTPATIENT)
Dept: RHEUMATOLOGY | Facility: CLINIC | Age: 49
End: 2019-05-13
Payer: COMMERCIAL

## 2019-05-13 VITALS
SYSTOLIC BLOOD PRESSURE: 122 MMHG | HEIGHT: 62 IN | BODY MASS INDEX: 46.03 KG/M2 | WEIGHT: 250.13 LBS | HEART RATE: 90 BPM | DIASTOLIC BLOOD PRESSURE: 77 MMHG

## 2019-05-13 DIAGNOSIS — F51.01 PRIMARY INSOMNIA: ICD-10-CM

## 2019-05-13 DIAGNOSIS — M05.741 RHEUMATOID ARTHRITIS INVOLVING BOTH HANDS WITH POSITIVE RHEUMATOID FACTOR: Primary | ICD-10-CM

## 2019-05-13 DIAGNOSIS — M70.61 TROCHANTERIC BURSITIS OF BOTH HIPS: ICD-10-CM

## 2019-05-13 DIAGNOSIS — M70.62 TROCHANTERIC BURSITIS OF BOTH HIPS: ICD-10-CM

## 2019-05-13 DIAGNOSIS — J32.9 RHINOSINUSITIS: ICD-10-CM

## 2019-05-13 DIAGNOSIS — M05.742 RHEUMATOID ARTHRITIS INVOLVING BOTH HANDS WITH POSITIVE RHEUMATOID FACTOR: Primary | ICD-10-CM

## 2019-05-13 PROCEDURE — 99999 PR PBB SHADOW E&M-EST. PATIENT-LVL III: ICD-10-PCS | Mod: PBBFAC,,, | Performed by: INTERNAL MEDICINE

## 2019-05-13 PROCEDURE — 3008F BODY MASS INDEX DOCD: CPT | Mod: CPTII,S$GLB,, | Performed by: INTERNAL MEDICINE

## 2019-05-13 PROCEDURE — 99999 PR PBB SHADOW E&M-EST. PATIENT-LVL III: CPT | Mod: PBBFAC,,, | Performed by: INTERNAL MEDICINE

## 2019-05-13 PROCEDURE — 99214 OFFICE O/P EST MOD 30 MIN: CPT | Mod: S$GLB,,, | Performed by: INTERNAL MEDICINE

## 2019-05-13 PROCEDURE — 3008F PR BODY MASS INDEX (BMI) DOCUMENTED: ICD-10-PCS | Mod: CPTII,S$GLB,, | Performed by: INTERNAL MEDICINE

## 2019-05-13 PROCEDURE — 99214 PR OFFICE/OUTPT VISIT, EST, LEVL IV, 30-39 MIN: ICD-10-PCS | Mod: S$GLB,,, | Performed by: INTERNAL MEDICINE

## 2019-05-13 RX ORDER — TRAZODONE HYDROCHLORIDE 50 MG/1
50-100 TABLET ORAL NIGHTLY
Qty: 60 TABLET | Refills: 11 | Status: SHIPPED | OUTPATIENT
Start: 2019-05-13 | End: 2020-06-03

## 2019-05-13 RX ORDER — TRAMADOL HYDROCHLORIDE 50 MG/1
TABLET ORAL
Qty: 180 TABLET | Refills: 2 | Status: SHIPPED | OUTPATIENT
Start: 2019-05-13 | End: 2019-08-31 | Stop reason: SDUPTHER

## 2019-05-13 RX ORDER — FLUTICASONE PROPIONATE 50 MCG
1 SPRAY, SUSPENSION (ML) NASAL DAILY
Qty: 1 BOTTLE | Refills: 3 | Status: SHIPPED | OUTPATIENT
Start: 2019-05-13 | End: 2020-11-25

## 2019-05-13 NOTE — PROGRESS NOTES
Subjective:          Chief Complaint: Annita Bettencourt is a 48 y.o. female who had concerns including Rheumatoid Arthritis.    HPI:  Patient is a 48-year-old female here for consultation f/u sero+ high titer RA  Humira Q 2 weeks approximately 4 months now    She is currently on MTX  178.5 mg once weekly   hydroxychloroquine 200 mgBID (since consultation as previously on once daily)  . and ibuprofen 800 mg t.i.d. p.r.n..    She has previously been on varying doses of prednisone PRN flares approx 1-2 monthly (50mg, 40mg, 30mg, 20mg and 10mg etc).    Patient has noted since starting Humira far fewer flares of her joints doing much better.  Unfortunately she is constant congestion with some shortness of breath since starting Humira.    Still with sleep difficulty: Flexeril did not help. Doxepin 10mg not helping. Restless with sleep latency, stays asleep once down.     Using Tramadol at least once daily to cover general aches and pains mostly in the hands and hips. Hands and wrist have been increased with stiffness and are numb in morning when she awakens. Resolves quickly. Does not have numbness during day.     No  +AM stiffness about 40min-1 hr. Joint improve with activity.   The patient was diagnosed 1.5 years ago  Under serologies to review.  She has chronic dry eyes using systane. + dry mouth-  Patient currently has pain in her right shoulder right wrist of rates his pain at 2/10.    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 No MI      Component      Latest Ref Rng & Units 8/7/2018   NIL      See text IU/mL 0.050   TB1 - Nil      See text IU/mL -0.030   TB2 - Nil      See text IU/mL -0.030   Mitogen - Nil      See text IU/mL 8.140   TB Gold Plus       Negative   Hepatitis B Surface Ag       Negative   Hep B C IgM       Negative   Hep A IgM       Negative   Hepatitis C Ab       Negative   Rheumatoid Factor      0.0 - 15.0 IU/mL 397.0 (H)   CCP Antibodies      <5.0 U/mL 160.5 (H)   Sed Rate       0 - 20 mm/Hr 9   CRP      0.0 - 8.2 mg/L 1.9       REVIEW OF SYSTEMS:    Review of Systems   Constitutional: Positive for malaise/fatigue. Negative for fever and weight loss.   HENT: Negative for sore throat.    Eyes: Negative for double vision, photophobia and redness.   Respiratory: Negative for cough, shortness of breath and wheezing.    Cardiovascular: Negative for chest pain, palpitations and orthopnea.   Gastrointestinal: Negative for abdominal pain, constipation and diarrhea.   Genitourinary: Negative for dysuria, hematuria and urgency.   Musculoskeletal: Positive for joint pain and myalgias. Negative for back pain.   Skin: Negative for rash.   Neurological: Negative for dizziness, tingling, focal weakness and headaches.   Endo/Heme/Allergies: Does not bruise/bleed easily.   Psychiatric/Behavioral: Negative for depression, hallucinations and suicidal ideas.               Objective:            Past Medical History:   Diagnosis Date    Acid reflux     Anxiety     Depression     Diabetes mellitus     Enlarged heart     Hypertension     Murmur     Rheumatoid arthritis      Family History   Problem Relation Age of Onset    Hypertension Father      Social History     Tobacco Use    Smoking status: Current Every Day Smoker     Packs/day: 0.25     Years: 25.00     Pack years: 6.25    Smokeless tobacco: Never Used   Substance Use Topics    Alcohol use: Yes     Comment: socially    Drug use: No         Current Outpatient Medications on File Prior to Visit   Medication Sig Dispense Refill    adalimumab (HUMIRA PEN) 40 mg/0.8 mL PnKt Inject 0.8 mLs (40 mg total) into the skin every 14 (fourteen) days. 2 pen 11    amlodipine (NORVASC) 10 MG tablet Take 10 mg by mouth once daily.      atenolol (TENORMIN) 25 MG tablet Take 25 mg by mouth once daily.  5    doxepin (SINEQUAN) 10 MG capsule Take 1 capsule (10 mg total) by mouth every evening. 30 capsule 11    esomeprazole magnesium (NEXIUM 24HR) 20 mg TbEC  Take 20 mg by mouth once daily.      fluoxetine (PROZAC) 40 MG capsule Take 40 mg by mouth once daily.      folic acid (FOLVITE) 1 MG tablet Take 1 tablet (1,000 mcg total) by mouth once daily. 30 tablet 2    hydroxychloroquine (PLAQUENIL) 200 mg tablet Take 1 tablet (200 mg total) by mouth 2 (two) times daily. 60 tablet 3    losartan (COZAAR) 50 MG tablet Take 50 mg by mouth once daily.      metformin (GLUCOPHAGE) 500 MG tablet Take 500 mg by mouth once daily.      methotrexate 2.5 MG Tab Take 10 tablets (25 mg total) by mouth every 7 days. 40 tablet 3    pravastatin (PRAVACHOL) 10 MG tablet Take 10 mg by mouth every evening.  5    traMADol (ULTRAM) 50 mg tablet TAKE 1 OR 2 TABLETS BY MOUTH EVERY 8 HOURS AS NEEDED FOR PAIN for  tablet 0    turmeric/turmeric ext/pepr ext (TURMERIC-TURMERIC EXT-PEPPER) 500-3 mg Cap Take 1 capsule by mouth every evening.      HYDROcodone-acetaminophen (NORCO) 5-325 mg per tablet Take 1 tablet by mouth every 8 (eight) hours as needed for Pain. 12 tablet 0    ibuprofen (ADVIL,MOTRIN) 800 MG tablet TAKE 1 TABLET BY MOUTH 2 OR 3 TIMES A DAY AS NEEDED 90 tablet 2    ondansetron (ZOFRAN) 4 MG tablet 4 mg.      [DISCONTINUED] cyclobenzaprine (FLEXERIL) 10 MG tablet Take 10 mg by mouth every evening.      [DISCONTINUED] cyclobenzaprine (FLEXERIL) 10 MG tablet TAKE 1 TABLET BY MOUTH AT BEDTIME 30 tablet 3    [DISCONTINUED] predniSONE (DELTASONE) 5 MG tablet TAKE 1 TABLET BY MOUTH TWO OR THREE TIMES DAILY AFTER MEALS 100 tablet 1     No current facility-administered medications on file prior to visit.        Vitals:    05/13/19 1621   BP: 122/77   Pulse: 90       Physical Exam:    Physical Exam   Constitutional: She is oriented to person, place, and time. She appears well-developed and well-nourished.   HENT:   Head: Normocephalic and atraumatic.   Mouth/Throat: Oropharynx is clear and moist.   Eyes: Pupils are equal, round, and reactive to light. EOM are normal.   Neck:  Normal range of motion.   Cardiovascular: Normal rate, regular rhythm and normal heart sounds.   Pulmonary/Chest: Effort normal and breath sounds normal.   Musculoskeletal:        Right shoulder: She exhibits decreased range of motion and tenderness. She exhibits no swelling.        Left shoulder: She exhibits normal range of motion, no tenderness and no swelling.        Right elbow: She exhibits normal range of motion and no swelling. No tenderness found.        Left elbow: She exhibits normal range of motion and no swelling. No tenderness found.        Right wrist: She exhibits decreased range of motion, tenderness and swelling.        Left wrist: She exhibits normal range of motion, no tenderness and no swelling.        Right hip: She exhibits decreased range of motion and tenderness.        Left hip: She exhibits decreased range of motion and tenderness.        Right knee: She exhibits normal range of motion and no swelling. No tenderness found.        Left knee: She exhibits normal range of motion and no swelling. No tenderness found.        Right hand: She exhibits tenderness. She exhibits normal range of motion and no swelling.        Left hand: She exhibits tenderness. She exhibits normal range of motion and no swelling.        Right foot: There is normal range of motion, no tenderness and no swelling.        Left foot: There is normal range of motion, no tenderness and no swelling.   PIP synovitismild decreased range of motion with  MCPs 2 3 some tenderness no swelling.  Wrist right with swelling decreased extension left wrist full range of motion no active synovitis.   tenderness in the shoulder pain on resisted abduction  Left with full range of motion on painful.   Neurological: She is alert and oriented to person, place, and time.   Skin: Skin is warm and dry.   Psychiatric: She has a normal mood and affect. Her behavior is normal.             Assessment:       Encounter Diagnoses   Name Primary?     Rheumatoid arthritis involving both hands with positive rheumatoid factor Yes    Primary insomnia     Trochanteric bursitis of both hips           Plan:        Rheumatoid arthritis involving both hands with positive rheumatoid factor  -     CBC auto differential; Standing; Expected date: 05/13/2019  -     Comprehensive metabolic panel; Standing; Expected date: 05/13/2019  -     Sedimentation rate; Standing; Expected date: 05/13/2019  -     C-reactive protein; Standing; Expected date: 05/13/2019    Primary insomnia    Trochanteric bursitis of both hips    Rhinosinusitis  -     fluticasone propionate (FLONASE) 50 mcg/actuation nasal spray; 1 spray (50 mcg total) by Each Nare route once daily.  Dispense: 1 Bottle; Refill: 3         Patient is a 48-year-old female with a history of rheumatoid arthritis managed on methotrexate and hydroxychloroquine 200 mg once daily.  For now I want her to increase her hydroxychloroquine to 200 mg b.i.d. continue methotrexate at 17.5 mg weekly.  I want her to get established for pre biologic labs in the event that we may consider using a biologic in the next 6 months.   -persistent synovitis despite DMARD therapy suboptimal control of RA.  Increase Hydroxychloroquine to 200mg twice daily and monitor over the next 8 weeks how your joints are feeling.   Ok for prednisone if flares as you have done in past.   Persistent synovitis, pain and stiffness despite MTX and HCQ    Noting increase URI s/sx with Humira we are going to conitnue for now and if escaltes will consider a change.       Rhinosinusitis: trial with Flonase, and claritin.     Patient is aware of the risks benefits side effects and monitoring requirements of biologic therapy including but not limited to disseminated tuberculosis recurrent serious infections suppression of the immune system, injection site reactions.  F/u 4 months.   Follow up in about 4 months (around 9/13/2019).      30min consultation with greater than 50%  spent in counseling, chart review and coordination of care. All questions answered.

## 2019-05-31 ENCOUNTER — TELEPHONE (OUTPATIENT)
Dept: PHARMACY | Facility: CLINIC | Age: 49
End: 2019-05-31

## 2019-06-07 ENCOUNTER — LAB VISIT (OUTPATIENT)
Dept: LAB | Facility: HOSPITAL | Age: 49
End: 2019-06-07
Attending: INTERNAL MEDICINE
Payer: COMMERCIAL

## 2019-06-07 DIAGNOSIS — M05.741 RHEUMATOID ARTHRITIS INVOLVING BOTH HANDS WITH POSITIVE RHEUMATOID FACTOR: ICD-10-CM

## 2019-06-07 DIAGNOSIS — M05.742 RHEUMATOID ARTHRITIS INVOLVING BOTH HANDS WITH POSITIVE RHEUMATOID FACTOR: ICD-10-CM

## 2019-06-07 LAB
ALBUMIN SERPL BCP-MCNC: 3.9 G/DL (ref 3.5–5.2)
ALP SERPL-CCNC: 74 U/L (ref 55–135)
ALT SERPL W/O P-5'-P-CCNC: 39 U/L (ref 10–44)
ANION GAP SERPL CALC-SCNC: 8 MMOL/L (ref 8–16)
AST SERPL-CCNC: 20 U/L (ref 10–40)
BASOPHILS # BLD AUTO: 0.07 K/UL (ref 0–0.2)
BASOPHILS NFR BLD: 1.1 % (ref 0–1.9)
BILIRUB SERPL-MCNC: 0.3 MG/DL (ref 0.1–1)
BUN SERPL-MCNC: 14 MG/DL (ref 6–20)
CALCIUM SERPL-MCNC: 10.4 MG/DL (ref 8.7–10.5)
CHLORIDE SERPL-SCNC: 107 MMOL/L (ref 95–110)
CO2 SERPL-SCNC: 25 MMOL/L (ref 23–29)
CREAT SERPL-MCNC: 0.9 MG/DL (ref 0.5–1.4)
CRP SERPL-MCNC: 5.3 MG/L (ref 0–8.2)
DIFFERENTIAL METHOD: ABNORMAL
EOSINOPHIL # BLD AUTO: 0.2 K/UL (ref 0–0.5)
EOSINOPHIL NFR BLD: 3.1 % (ref 0–8)
ERYTHROCYTE [DISTWIDTH] IN BLOOD BY AUTOMATED COUNT: 16.7 % (ref 11.5–14.5)
ERYTHROCYTE [SEDIMENTATION RATE] IN BLOOD BY WESTERGREN METHOD: 5 MM/HR (ref 0–20)
EST. GFR  (AFRICAN AMERICAN): >60 ML/MIN/1.73 M^2
EST. GFR  (NON AFRICAN AMERICAN): >60 ML/MIN/1.73 M^2
GLUCOSE SERPL-MCNC: 100 MG/DL (ref 70–110)
HCT VFR BLD AUTO: 40 % (ref 37–48.5)
HGB BLD-MCNC: 12.1 G/DL (ref 12–16)
IMM GRANULOCYTES # BLD AUTO: 0.04 K/UL (ref 0–0.04)
IMM GRANULOCYTES NFR BLD AUTO: 0.6 % (ref 0–0.5)
LYMPHOCYTES # BLD AUTO: 1.7 K/UL (ref 1–4.8)
LYMPHOCYTES NFR BLD: 26.5 % (ref 18–48)
MCH RBC QN AUTO: 26.3 PG (ref 27–31)
MCHC RBC AUTO-ENTMCNC: 30.3 G/DL (ref 32–36)
MCV RBC AUTO: 87 FL (ref 82–98)
MONOCYTES # BLD AUTO: 0.5 K/UL (ref 0.3–1)
MONOCYTES NFR BLD: 7.6 % (ref 4–15)
NEUTROPHILS # BLD AUTO: 4 K/UL (ref 1.8–7.7)
NEUTROPHILS NFR BLD: 61.1 % (ref 38–73)
NRBC BLD-RTO: 0 /100 WBC
PLATELET # BLD AUTO: 316 K/UL (ref 150–350)
PMV BLD AUTO: 11.4 FL (ref 9.2–12.9)
POTASSIUM SERPL-SCNC: 4.4 MMOL/L (ref 3.5–5.1)
PROT SERPL-MCNC: 6.9 G/DL (ref 6–8.4)
RBC # BLD AUTO: 4.6 M/UL (ref 4–5.4)
SODIUM SERPL-SCNC: 140 MMOL/L (ref 136–145)
WBC # BLD AUTO: 6.54 K/UL (ref 3.9–12.7)

## 2019-06-07 PROCEDURE — 85651 RBC SED RATE NONAUTOMATED: CPT | Mod: PO

## 2019-06-07 PROCEDURE — 86140 C-REACTIVE PROTEIN: CPT

## 2019-06-07 PROCEDURE — 36415 COLL VENOUS BLD VENIPUNCTURE: CPT | Mod: PO

## 2019-06-07 PROCEDURE — 85025 COMPLETE CBC W/AUTO DIFF WBC: CPT

## 2019-06-07 PROCEDURE — 80053 COMPREHEN METABOLIC PANEL: CPT

## 2019-06-14 ENCOUNTER — PATIENT MESSAGE (OUTPATIENT)
Dept: RHEUMATOLOGY | Facility: CLINIC | Age: 49
End: 2019-06-14

## 2019-06-20 ENCOUNTER — PATIENT MESSAGE (OUTPATIENT)
Dept: RHEUMATOLOGY | Facility: CLINIC | Age: 49
End: 2019-06-20

## 2019-06-28 ENCOUNTER — TELEPHONE (OUTPATIENT)
Dept: PHARMACY | Facility: CLINIC | Age: 49
End: 2019-06-28

## 2019-07-05 NOTE — TELEPHONE ENCOUNTER
Patient returned phone call back regard specialty medication refill for Humira Pen 40mg/0.8mL (2/28) $5.00/004- Patient scheduled to have medication ship out on Tues 7/9 to arrive on Wed 7/10 address confirmed & [facility]. Patient informed no new medications, conditions or allergies since last talked to [facility]. Patient have no injection remaining/next injection due on Thurs 7/11 & no missed dose. Patient declined questions for the clinical pharmacist. Patient voiced understanding.     [employer]

## 2019-08-05 ENCOUNTER — PATIENT MESSAGE (OUTPATIENT)
Dept: PHARMACY | Facility: CLINIC | Age: 49
End: 2019-08-05

## 2019-08-05 ENCOUNTER — TELEPHONE (OUTPATIENT)
Dept: PHARMACY | Facility: CLINIC | Age: 49
End: 2019-08-05

## 2019-08-07 NOTE — TELEPHONE ENCOUNTER
Patient returned call to [facility] for refill Humira. Patient confirmed need for refill, 0 doses on hand and due this Saturday. She denies any missed doses. Says she has started Flonase and zyrtec over last couple months. Shipment set for 8/8 for patient to receive 8/9. Address verified. New [employer] container requested this fill. No other questions or concerns today. $5 copay (004).

## 2019-08-08 RX ORDER — METHOTREXATE 2.5 MG/1
TABLET ORAL
Qty: 40 TABLET | Refills: 3 | Status: SHIPPED | OUTPATIENT
Start: 2019-08-08 | End: 2019-10-16 | Stop reason: SDUPTHER

## 2019-08-13 RX ORDER — LOSARTAN POTASSIUM 50 MG/1
TABLET ORAL
Qty: 90 TABLET | OUTPATIENT
Start: 2019-08-13

## 2019-08-30 ENCOUNTER — TELEPHONE (OUTPATIENT)
Dept: PHARMACY | Facility: CLINIC | Age: 49
End: 2019-08-30

## 2019-08-31 DIAGNOSIS — M05.741 RHEUMATOID ARTHRITIS INVOLVING BOTH HANDS WITH POSITIVE RHEUMATOID FACTOR: ICD-10-CM

## 2019-08-31 DIAGNOSIS — M05.742 RHEUMATOID ARTHRITIS INVOLVING BOTH HANDS WITH POSITIVE RHEUMATOID FACTOR: ICD-10-CM

## 2019-09-04 RX ORDER — HYDROXYCHLOROQUINE SULFATE 200 MG/1
TABLET, FILM COATED ORAL
Qty: 60 TABLET | Refills: 3 | Status: SHIPPED | OUTPATIENT
Start: 2019-09-04 | End: 2019-10-16 | Stop reason: SDUPTHER

## 2019-09-04 RX ORDER — TRAMADOL HYDROCHLORIDE 50 MG/1
TABLET ORAL
Qty: 180 TABLET | Refills: 2 | Status: SHIPPED | OUTPATIENT
Start: 2019-09-04 | End: 2019-10-16 | Stop reason: SDUPTHER

## 2019-09-12 ENCOUNTER — PATIENT MESSAGE (OUTPATIENT)
Dept: RHEUMATOLOGY | Facility: CLINIC | Age: 49
End: 2019-09-12

## 2019-09-14 ENCOUNTER — LAB VISIT (OUTPATIENT)
Dept: LAB | Facility: HOSPITAL | Age: 49
End: 2019-09-14
Attending: INTERNAL MEDICINE
Payer: COMMERCIAL

## 2019-09-14 DIAGNOSIS — M05.741 RHEUMATOID ARTHRITIS INVOLVING BOTH HANDS WITH POSITIVE RHEUMATOID FACTOR: ICD-10-CM

## 2019-09-14 DIAGNOSIS — M05.742 RHEUMATOID ARTHRITIS INVOLVING BOTH HANDS WITH POSITIVE RHEUMATOID FACTOR: ICD-10-CM

## 2019-09-14 LAB
ALBUMIN SERPL BCP-MCNC: 3.9 G/DL (ref 3.5–5.2)
ALP SERPL-CCNC: 73 U/L (ref 55–135)
ALT SERPL W/O P-5'-P-CCNC: 26 U/L (ref 10–44)
ANION GAP SERPL CALC-SCNC: 9 MMOL/L (ref 8–16)
AST SERPL-CCNC: 18 U/L (ref 10–40)
BASOPHILS # BLD AUTO: 0.06 K/UL (ref 0–0.2)
BASOPHILS NFR BLD: 0.9 % (ref 0–1.9)
BILIRUB SERPL-MCNC: 0.4 MG/DL (ref 0.1–1)
BUN SERPL-MCNC: 15 MG/DL (ref 6–20)
CALCIUM SERPL-MCNC: 9.7 MG/DL (ref 8.7–10.5)
CHLORIDE SERPL-SCNC: 106 MMOL/L (ref 95–110)
CO2 SERPL-SCNC: 25 MMOL/L (ref 23–29)
CREAT SERPL-MCNC: 0.9 MG/DL (ref 0.5–1.4)
CRP SERPL-MCNC: 3.7 MG/L (ref 0–8.2)
DIFFERENTIAL METHOD: ABNORMAL
EOSINOPHIL # BLD AUTO: 0.2 K/UL (ref 0–0.5)
EOSINOPHIL NFR BLD: 3.6 % (ref 0–8)
ERYTHROCYTE [DISTWIDTH] IN BLOOD BY AUTOMATED COUNT: 17.8 % (ref 11.5–14.5)
ERYTHROCYTE [SEDIMENTATION RATE] IN BLOOD BY WESTERGREN METHOD: 10 MM/HR (ref 0–36)
EST. GFR  (AFRICAN AMERICAN): >60 ML/MIN/1.73 M^2
EST. GFR  (NON AFRICAN AMERICAN): >60 ML/MIN/1.73 M^2
GLUCOSE SERPL-MCNC: 96 MG/DL (ref 70–110)
HCT VFR BLD AUTO: 39.6 % (ref 37–48.5)
HGB BLD-MCNC: 12 G/DL (ref 12–16)
IMM GRANULOCYTES # BLD AUTO: 0.03 K/UL (ref 0–0.04)
IMM GRANULOCYTES NFR BLD AUTO: 0.5 % (ref 0–0.5)
LYMPHOCYTES # BLD AUTO: 1.7 K/UL (ref 1–4.8)
LYMPHOCYTES NFR BLD: 25.8 % (ref 18–48)
MCH RBC QN AUTO: 26.3 PG (ref 27–31)
MCHC RBC AUTO-ENTMCNC: 30.3 G/DL (ref 32–36)
MCV RBC AUTO: 87 FL (ref 82–98)
MONOCYTES # BLD AUTO: 0.7 K/UL (ref 0.3–1)
MONOCYTES NFR BLD: 10.3 % (ref 4–15)
NEUTROPHILS # BLD AUTO: 3.8 K/UL (ref 1.8–7.7)
NEUTROPHILS NFR BLD: 58.9 % (ref 38–73)
NRBC BLD-RTO: 0 /100 WBC
PLATELET # BLD AUTO: 294 K/UL (ref 150–350)
PMV BLD AUTO: 11.1 FL (ref 9.2–12.9)
POTASSIUM SERPL-SCNC: 4.3 MMOL/L (ref 3.5–5.1)
PROT SERPL-MCNC: 6.8 G/DL (ref 6–8.4)
RBC # BLD AUTO: 4.57 M/UL (ref 4–5.4)
SODIUM SERPL-SCNC: 140 MMOL/L (ref 136–145)
WBC # BLD AUTO: 6.4 K/UL (ref 3.9–12.7)

## 2019-09-14 PROCEDURE — 86140 C-REACTIVE PROTEIN: CPT

## 2019-09-14 PROCEDURE — 80053 COMPREHEN METABOLIC PANEL: CPT

## 2019-09-14 PROCEDURE — 36415 COLL VENOUS BLD VENIPUNCTURE: CPT | Mod: PO

## 2019-09-14 PROCEDURE — 85025 COMPLETE CBC W/AUTO DIFF WBC: CPT

## 2019-09-14 PROCEDURE — 85652 RBC SED RATE AUTOMATED: CPT

## 2019-09-30 ENCOUNTER — TELEPHONE (OUTPATIENT)
Dept: PHARMACY | Facility: CLINIC | Age: 49
End: 2019-09-30

## 2019-10-14 RX ORDER — HYDROCODONE BITARTRATE AND ACETAMINOPHEN 5; 325 MG/1; MG/1
1 TABLET ORAL EVERY 8 HOURS PRN
Qty: 12 TABLET | Refills: 0 | Status: CANCELLED | OUTPATIENT
Start: 2019-10-14 | End: 2019-10-18

## 2019-10-16 ENCOUNTER — OFFICE VISIT (OUTPATIENT)
Dept: RHEUMATOLOGY | Facility: CLINIC | Age: 49
End: 2019-10-16
Payer: COMMERCIAL

## 2019-10-16 ENCOUNTER — IMMUNIZATION (OUTPATIENT)
Dept: PHARMACY | Facility: CLINIC | Age: 49
End: 2019-10-16
Payer: COMMERCIAL

## 2019-10-16 VITALS
SYSTOLIC BLOOD PRESSURE: 126 MMHG | DIASTOLIC BLOOD PRESSURE: 77 MMHG | HEART RATE: 88 BPM | WEIGHT: 249.13 LBS | HEIGHT: 62 IN | BODY MASS INDEX: 45.84 KG/M2

## 2019-10-16 DIAGNOSIS — M05.741 RHEUMATOID ARTHRITIS INVOLVING BOTH HANDS WITH POSITIVE RHEUMATOID FACTOR: Primary | ICD-10-CM

## 2019-10-16 DIAGNOSIS — D84.9 IMMUNOSUPPRESSION: ICD-10-CM

## 2019-10-16 DIAGNOSIS — M05.742 RHEUMATOID ARTHRITIS INVOLVING BOTH HANDS WITH POSITIVE RHEUMATOID FACTOR: Primary | ICD-10-CM

## 2019-10-16 PROCEDURE — 3008F PR BODY MASS INDEX (BMI) DOCUMENTED: ICD-10-PCS | Mod: CPTII,S$GLB,, | Performed by: INTERNAL MEDICINE

## 2019-10-16 PROCEDURE — 99214 PR OFFICE/OUTPT VISIT, EST, LEVL IV, 30-39 MIN: ICD-10-PCS | Mod: S$GLB,,, | Performed by: INTERNAL MEDICINE

## 2019-10-16 PROCEDURE — 99214 OFFICE O/P EST MOD 30 MIN: CPT | Mod: S$GLB,,, | Performed by: INTERNAL MEDICINE

## 2019-10-16 PROCEDURE — 99999 PR PBB SHADOW E&M-EST. PATIENT-LVL III: ICD-10-PCS | Mod: PBBFAC,,, | Performed by: INTERNAL MEDICINE

## 2019-10-16 PROCEDURE — 3008F BODY MASS INDEX DOCD: CPT | Mod: CPTII,S$GLB,, | Performed by: INTERNAL MEDICINE

## 2019-10-16 PROCEDURE — 99999 PR PBB SHADOW E&M-EST. PATIENT-LVL III: CPT | Mod: PBBFAC,,, | Performed by: INTERNAL MEDICINE

## 2019-10-16 RX ORDER — HYDROCODONE BITARTRATE AND ACETAMINOPHEN 5; 325 MG/1; MG/1
1 TABLET ORAL EVERY 8 HOURS PRN
Qty: 12 TABLET | Refills: 0 | Status: SHIPPED | OUTPATIENT
Start: 2019-10-16 | End: 2019-12-23 | Stop reason: SDUPTHER

## 2019-10-16 RX ORDER — PREDNISONE 5 MG/1
5 TABLET ORAL DAILY PRN
Qty: 30 TABLET | Refills: 6 | Status: SHIPPED | OUTPATIENT
Start: 2019-10-16 | End: 2019-11-15

## 2019-10-16 RX ORDER — METHOTREXATE 2.5 MG/1
TABLET ORAL
Qty: 40 TABLET | Refills: 3 | Status: SHIPPED | OUTPATIENT
Start: 2019-10-16 | End: 2020-05-04

## 2019-10-16 RX ORDER — HYDROXYCHLOROQUINE SULFATE 200 MG/1
200 TABLET, FILM COATED ORAL 2 TIMES DAILY
Qty: 60 TABLET | Refills: 3 | Status: SHIPPED | OUTPATIENT
Start: 2019-10-16 | End: 2020-05-07

## 2019-10-16 RX ORDER — TRAMADOL HYDROCHLORIDE 50 MG/1
TABLET ORAL
Qty: 180 TABLET | Refills: 2 | Status: SHIPPED | OUTPATIENT
Start: 2019-10-16 | End: 2020-02-18

## 2019-10-16 NOTE — PROGRESS NOTES
Subjective:          Chief Complaint: Annita Bettencourt is a 49 y.o. female who had concerns including Rheumatoid Arthritis.    HPI:  Patient is a 48-year-old female here for consultation f/u sero+ high titer RA  Humira Q 2 weeks approximately 4 months now    She is currently on MTX  178.5 mg once weekly   hydroxychloroquine 200 mgBID (since consultation as previously on once daily)  . and ibuprofen 800 mg t.i.d. p.r.n..    She has previously been on varying doses of prednisone PRN flares approx 1-2 monthly (50mg, 40mg, 30mg, 20mg and 10mg etc).    Patient has noted since starting Humira far fewer flares of her joints doing much better.  Unfortunately she is constant congestion with some shortness of breath since starting Humira.    Still with sleep difficulty: Flexeril did not help. Doxepin 10mg not helping. Restless with sleep latency, stays asleep once down.     Using Tramadol at least once daily to cover general aches and pains mostly in the hands and hips. Hands and wrist have been increased with stiffness and are numb in morning when she awakens. Resolves quickly. Does not have numbness during day.     No  +AM stiffness about 40min-1 hr. Joint improve with activity.   The patient was diagnosed 1.5 years ago  Under serologies to review.  She has chronic dry eyes using systane. + dry mouth-  Patient currently has pain in her right shoulder right wrist of rates his pain at 2/10.    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 No MI      Component      Latest Ref Rng & Units 8/7/2018   NIL      See text IU/mL 0.050   TB1 - Nil      See text IU/mL -0.030   TB2 - Nil      See text IU/mL -0.030   Mitogen - Nil      See text IU/mL 8.140   TB Gold Plus       Negative   Hepatitis B Surface Ag       Negative   Hep B C IgM       Negative   Hep A IgM       Negative   Hepatitis C Ab       Negative   Rheumatoid Factor      0.0 - 15.0 IU/mL 397.0 (H)   CCP Antibodies      <5.0 U/mL 160.5 (H)   Sed Rate       0 - 20 mm/Hr 9   CRP      0.0 - 8.2 mg/L 1.9       REVIEW OF SYSTEMS:    Review of Systems   Constitutional: Positive for malaise/fatigue. Negative for fever and weight loss.   HENT: Negative for sore throat.    Eyes: Negative for double vision, photophobia and redness.   Respiratory: Negative for cough, shortness of breath and wheezing.    Cardiovascular: Negative for chest pain, palpitations and orthopnea.   Gastrointestinal: Negative for abdominal pain, constipation and diarrhea.   Genitourinary: Negative for dysuria, hematuria and urgency.   Musculoskeletal: Positive for joint pain and myalgias. Negative for back pain.   Skin: Negative for rash.   Neurological: Negative for dizziness, tingling, focal weakness and headaches.   Endo/Heme/Allergies: Does not bruise/bleed easily.   Psychiatric/Behavioral: Negative for depression, hallucinations and suicidal ideas.               Objective:            Past Medical History:   Diagnosis Date    Acid reflux     Anxiety     Depression     Diabetes mellitus     Enlarged heart     Hypertension     Murmur     Rheumatoid arthritis      Family History   Problem Relation Age of Onset    Hypertension Father      Social History     Tobacco Use    Smoking status: Current Every Day Smoker     Packs/day: 0.25     Years: 25.00     Pack years: 6.25    Smokeless tobacco: Never Used   Substance Use Topics    Alcohol use: Yes     Comment: socially    Drug use: No         Current Outpatient Medications on File Prior to Visit   Medication Sig Dispense Refill    adalimumab (HUMIRA PEN) 40 mg/0.8 mL PnKt Inject 0.8 mLs (40 mg total) into the skin every 14 (fourteen) days. 2 pen 11    amlodipine (NORVASC) 10 MG tablet Take 10 mg by mouth once daily.      atenolol (TENORMIN) 25 MG tablet Take 25 mg by mouth once daily.  5    doxepin (SINEQUAN) 10 MG capsule Take 1 capsule (10 mg total) by mouth every evening. 30 capsule 11    esomeprazole magnesium (NEXIUM 24HR) 20 mg TbEC  Take 20 mg by mouth once daily.      fluoxetine (PROZAC) 40 MG capsule Take 40 mg by mouth once daily.      fluticasone propionate (FLONASE) 50 mcg/actuation nasal spray 1 spray (50 mcg total) by Each Nare route once daily. 1 Bottle 3    folic acid (FOLVITE) 1 MG tablet Take 1 tablet (1,000 mcg total) by mouth once daily. 30 tablet 2    HYDROcodone-acetaminophen (NORCO) 5-325 mg per tablet Take 1 tablet by mouth every 8 (eight) hours as needed for Pain. 12 tablet 0    hydroxychloroquine (PLAQUENIL) 200 mg tablet TAKE 1 TABLET BY MOUTH TWICE DAILY 60 tablet 3    ibuprofen (ADVIL,MOTRIN) 800 MG tablet TAKE 1 TABLET BY MOUTH 2 OR 3 TIMES A DAY AS NEEDED 90 tablet 2    losartan (COZAAR) 50 MG tablet Take 50 mg by mouth once daily.      metformin (GLUCOPHAGE) 500 MG tablet Take 500 mg by mouth once daily.      methotrexate 2.5 MG Tab TAKE 10 TABLETS BY MOUTH EVERY 7 DAYS 40 tablet 3    ondansetron (ZOFRAN) 4 MG tablet 4 mg.      pravastatin (PRAVACHOL) 10 MG tablet Take 10 mg by mouth every evening.  5    traMADol (ULTRAM) 50 mg tablet TAKE 1 OR 2 TABLETS BY MOUTH EVERY 8 HOURS AS NEEDED FOR PAIN. Chronic pain >7 days medically necessary override dx code G89.4 180 tablet 2    traZODone (DESYREL) 50 MG tablet Take 1-2 tablets ( mg total) by mouth every evening. 60 tablet 11    turmeric/turmeric ext/pepr ext (TURMERIC-TURMERIC EXT-PEPPER) 500-3 mg Cap Take 1 capsule by mouth every evening.       No current facility-administered medications on file prior to visit.        Vitals:    10/16/19 1552   BP: 126/77   Pulse: 88       Physical Exam:    Physical Exam   Constitutional: She is oriented to person, place, and time. She appears well-developed and well-nourished.   HENT:   Head: Normocephalic and atraumatic.   Mouth/Throat: Oropharynx is clear and moist.   Eyes: Pupils are equal, round, and reactive to light. EOM are normal.   Neck: Normal range of motion.   Cardiovascular: Normal rate, regular rhythm  and normal heart sounds.   Pulmonary/Chest: Effort normal and breath sounds normal.   Musculoskeletal:        Right shoulder: She exhibits decreased range of motion and tenderness. She exhibits no swelling.        Left shoulder: She exhibits normal range of motion, no tenderness and no swelling.        Right elbow: She exhibits normal range of motion and no swelling. No tenderness found.        Left elbow: She exhibits normal range of motion and no swelling. No tenderness found.        Right wrist: She exhibits decreased range of motion, tenderness and swelling.        Left wrist: She exhibits normal range of motion, no tenderness and no swelling.        Right hip: She exhibits decreased range of motion and tenderness.        Left hip: She exhibits decreased range of motion and tenderness.        Right knee: She exhibits normal range of motion and no swelling. No tenderness found.        Left knee: She exhibits normal range of motion and no swelling. No tenderness found.        Right hand: She exhibits tenderness. She exhibits normal range of motion and no swelling.        Left hand: She exhibits tenderness. She exhibits normal range of motion and no swelling.        Right foot: There is normal range of motion, no tenderness and no swelling.        Left foot: There is normal range of motion, no tenderness and no swelling.   PIP synovitismild decreased range of motion with  MCPs 2 3 some tenderness no swelling.  Wrist right with swelling decreased extension left wrist full range of motion no active synovitis.   tenderness in the shoulder pain on resisted abduction  Left with full range of motion on painful.   Neurological: She is alert and oriented to person, place, and time.   Skin: Skin is warm and dry.   Psychiatric: She has a normal mood and affect. Her behavior is normal.             Assessment:       Encounter Diagnosis   Name Primary?    Rheumatoid arthritis involving both hands with positive rheumatoid  factor Yes          Plan:        Rheumatoid arthritis involving both hands with positive rheumatoid factor         Patient is a 48-year-old female with a history of rheumatoid arthritis managed on methotrexate and hydroxychloroquine 200 mg once daily.  For now I want her to increase her hydroxychloroquine to 200 mg b.i.d. continue methotrexate at 17.5 mg weekly.  I want her to get established for pre biologic labs in the event that we may consider using a biologic in the next 6 months.   -persistent synovitis despite DMARD therapy suboptimal control of RA.  Increase Hydroxychloroquine to 200mg twice daily and monitor over the next 8 weeks how your joints are feeling.   Ok for prednisone if flares as you have done in past.   Persistent synovitis, pain and stiffness despite   MTX 10 tabs weekly   HCQ 200mg BID-UTD 2019    Noting increase URI s/sx with Humira we are going to conitnue for now and if escaltes  Rhinosinusitis: trial with Flonase, and claritin. With injections and this is helping.     Patient is aware of the risks benefits side effects and monitoring requirements of biologic therapy including but not limited to disseminated tuberculosis recurrent serious infections suppression of the immune system, injection site reactions.  F/u 4 months.   No follow-ups on file.      30min consultation with greater than 50% spent in counseling, chart review and coordination of care. All questions answered.

## 2019-10-24 ENCOUNTER — TELEPHONE (OUTPATIENT)
Dept: PHARMACY | Facility: CLINIC | Age: 49
End: 2019-10-24

## 2019-11-18 ENCOUNTER — TELEPHONE (OUTPATIENT)
Dept: PHARMACY | Facility: CLINIC | Age: 49
End: 2019-11-18

## 2019-11-19 NOTE — TELEPHONE ENCOUNTER
Attempted to reach patient for Humira refill and follow-up. No answer. Left voicemail for call back. Will follow-up.

## 2019-11-25 NOTE — TELEPHONE ENCOUNTER
Humira refill confirmed. We will ship Humira refill on  via [facility] to arrive on . $5.00 copay- 004. Confirmed 2 patient identifiers - name and .     Patient has 0 doses of Humira remaining, administers on Saturday, next dose due . Patient reports they are not having any side effects so far, in the past patient has suffered from respiratory tract infections. She does reports some mild bruising at the injection site. Ms Bettencourt reports her some mild joint pain and morning stiffness, but reports he RA does not keep her from daily activities. No missed doses, no new medications, no new allergies or health conditions reported at this time. All questions answered and addressed to patient's satisfaction. Patient verbalized understanding.

## 2019-11-27 ENCOUNTER — PATIENT MESSAGE (OUTPATIENT)
Dept: RHEUMATOLOGY | Facility: CLINIC | Age: 49
End: 2019-11-27

## 2019-12-19 RX ORDER — ADALIMUMAB 40MG/0.8ML
40 KIT SUBCUTANEOUS
Qty: 2 PEN | Refills: 11 | Status: CANCELLED | OUTPATIENT
Start: 2019-12-19 | End: 2020-01-18

## 2019-12-21 ENCOUNTER — LAB VISIT (OUTPATIENT)
Dept: LAB | Facility: HOSPITAL | Age: 49
End: 2019-12-21
Attending: INTERNAL MEDICINE
Payer: COMMERCIAL

## 2019-12-21 DIAGNOSIS — D84.9 IMMUNOSUPPRESSION: ICD-10-CM

## 2019-12-21 DIAGNOSIS — M05.741 RHEUMATOID ARTHRITIS INVOLVING BOTH HANDS WITH POSITIVE RHEUMATOID FACTOR: ICD-10-CM

## 2019-12-21 DIAGNOSIS — M05.742 RHEUMATOID ARTHRITIS INVOLVING BOTH HANDS WITH POSITIVE RHEUMATOID FACTOR: ICD-10-CM

## 2019-12-21 LAB
ALBUMIN SERPL BCP-MCNC: 3.7 G/DL (ref 3.5–5.2)
ALP SERPL-CCNC: 66 U/L (ref 55–135)
ALT SERPL W/O P-5'-P-CCNC: 28 U/L (ref 10–44)
ANION GAP SERPL CALC-SCNC: 12 MMOL/L (ref 8–16)
AST SERPL-CCNC: 19 U/L (ref 10–40)
BASOPHILS # BLD AUTO: 0.05 K/UL (ref 0–0.2)
BASOPHILS NFR BLD: 1 % (ref 0–1.9)
BILIRUB SERPL-MCNC: 0.4 MG/DL (ref 0.1–1)
BUN SERPL-MCNC: 12 MG/DL (ref 6–20)
CALCIUM SERPL-MCNC: 9.6 MG/DL (ref 8.7–10.5)
CHLORIDE SERPL-SCNC: 108 MMOL/L (ref 95–110)
CO2 SERPL-SCNC: 22 MMOL/L (ref 23–29)
CREAT SERPL-MCNC: 0.9 MG/DL (ref 0.5–1.4)
CRP SERPL-MCNC: 5.5 MG/L (ref 0–8.2)
DIFFERENTIAL METHOD: ABNORMAL
EOSINOPHIL # BLD AUTO: 0.3 K/UL (ref 0–0.5)
EOSINOPHIL NFR BLD: 5.3 % (ref 0–8)
ERYTHROCYTE [DISTWIDTH] IN BLOOD BY AUTOMATED COUNT: 17.1 % (ref 11.5–14.5)
ERYTHROCYTE [SEDIMENTATION RATE] IN BLOOD BY WESTERGREN METHOD: 11 MM/HR (ref 0–36)
EST. GFR  (AFRICAN AMERICAN): >60 ML/MIN/1.73 M^2
EST. GFR  (NON AFRICAN AMERICAN): >60 ML/MIN/1.73 M^2
GLUCOSE SERPL-MCNC: 105 MG/DL (ref 70–110)
HCT VFR BLD AUTO: 38.1 % (ref 37–48.5)
HGB BLD-MCNC: 12.3 G/DL (ref 12–16)
LYMPHOCYTES # BLD AUTO: 1.2 K/UL (ref 1–4.8)
LYMPHOCYTES NFR BLD: 23.2 % (ref 18–48)
MCH RBC QN AUTO: 26.4 PG (ref 27–31)
MCHC RBC AUTO-ENTMCNC: 32.3 G/DL (ref 32–36)
MCV RBC AUTO: 82 FL (ref 82–98)
MONOCYTES # BLD AUTO: 0.5 K/UL (ref 0.3–1)
MONOCYTES NFR BLD: 9.3 % (ref 4–15)
NEUTROPHILS # BLD AUTO: 3.2 K/UL (ref 1.8–7.7)
NEUTROPHILS NFR BLD: 61.2 % (ref 38–73)
PLATELET # BLD AUTO: 271 K/UL (ref 150–350)
PMV BLD AUTO: 11.1 FL (ref 9.2–12.9)
POTASSIUM SERPL-SCNC: 4.4 MMOL/L (ref 3.5–5.1)
PROT SERPL-MCNC: 6.7 G/DL (ref 6–8.4)
RBC # BLD AUTO: 4.66 M/UL (ref 4–5.4)
SODIUM SERPL-SCNC: 142 MMOL/L (ref 136–145)
WBC # BLD AUTO: 5.25 K/UL (ref 3.9–12.7)

## 2019-12-21 PROCEDURE — 80053 COMPREHEN METABOLIC PANEL: CPT

## 2019-12-21 PROCEDURE — 36415 COLL VENOUS BLD VENIPUNCTURE: CPT | Mod: PO

## 2019-12-21 PROCEDURE — 85025 COMPLETE CBC W/AUTO DIFF WBC: CPT | Mod: PO

## 2019-12-21 PROCEDURE — 86140 C-REACTIVE PROTEIN: CPT

## 2019-12-21 PROCEDURE — 85652 RBC SED RATE AUTOMATED: CPT

## 2019-12-23 RX ORDER — HYDROCODONE BITARTRATE AND ACETAMINOPHEN 5; 325 MG/1; MG/1
1 TABLET ORAL EVERY 8 HOURS PRN
Qty: 12 TABLET | Refills: 0 | Status: SHIPPED | OUTPATIENT
Start: 2019-12-23 | End: 2020-02-18

## 2019-12-28 ENCOUNTER — TELEPHONE (OUTPATIENT)
Dept: RHEUMATOLOGY | Facility: CLINIC | Age: 49
End: 2019-12-28

## 2019-12-28 RX ORDER — ADALIMUMAB 40MG/0.8ML
40 KIT SUBCUTANEOUS
Qty: 2 PEN | Refills: 11 | Status: SHIPPED | OUTPATIENT
Start: 2019-12-28 | End: 2020-02-18

## 2019-12-29 NOTE — TELEPHONE ENCOUNTER
----- Message from Kiesha Mckeon sent at 12/26/2019 11:01 AM CST -----  Regarding: Medication refill   Good morning,     Patient is in need of a Humira refill. The refill request is still pending approval, and the patient will need a dose for 12/18.     Please send to [facility] if appropriate.    Thank you.     Kiesha Mckeon  [facility]   Phone: [phone]  Fax:[phone]  [phone]

## 2019-12-30 ENCOUNTER — TELEPHONE (OUTPATIENT)
Dept: PHARMACY | Facility: CLINIC | Age: 49
End: 2019-12-30

## 2019-12-30 NOTE — TELEPHONE ENCOUNTER
Refill call regarding Humira from [facility]. Shipping out Humira on  for 1/3 arrival with patients consent. Copay of $5.00 @ 004. Address and  confirmed.

## 2020-01-29 ENCOUNTER — TELEPHONE (OUTPATIENT)
Dept: PHARMACY | Facility: CLINIC | Age: 50
End: 2020-01-29

## 2020-02-18 ENCOUNTER — OFFICE VISIT (OUTPATIENT)
Dept: RHEUMATOLOGY | Facility: CLINIC | Age: 50
End: 2020-02-18
Payer: COMMERCIAL

## 2020-02-18 VITALS
SYSTOLIC BLOOD PRESSURE: 117 MMHG | HEART RATE: 76 BPM | WEIGHT: 230.25 LBS | DIASTOLIC BLOOD PRESSURE: 66 MMHG | HEIGHT: 62 IN | BODY MASS INDEX: 42.37 KG/M2

## 2020-02-18 DIAGNOSIS — M05.741 RHEUMATOID ARTHRITIS INVOLVING BOTH HANDS WITH POSITIVE RHEUMATOID FACTOR: Primary | ICD-10-CM

## 2020-02-18 DIAGNOSIS — D84.9 IMMUNOSUPPRESSION: ICD-10-CM

## 2020-02-18 DIAGNOSIS — M05.742 RHEUMATOID ARTHRITIS INVOLVING BOTH HANDS WITH POSITIVE RHEUMATOID FACTOR: Primary | ICD-10-CM

## 2020-02-18 PROCEDURE — 99999 PR PBB SHADOW E&M-EST. PATIENT-LVL III: ICD-10-PCS | Mod: PBBFAC,,, | Performed by: INTERNAL MEDICINE

## 2020-02-18 PROCEDURE — 99999 PR PBB SHADOW E&M-EST. PATIENT-LVL III: CPT | Mod: PBBFAC,,, | Performed by: INTERNAL MEDICINE

## 2020-02-18 PROCEDURE — 99215 PR OFFICE/OUTPT VISIT, EST, LEVL V, 40-54 MIN: ICD-10-PCS | Mod: S$GLB,,, | Performed by: INTERNAL MEDICINE

## 2020-02-18 PROCEDURE — 3008F BODY MASS INDEX DOCD: CPT | Mod: CPTII,S$GLB,, | Performed by: INTERNAL MEDICINE

## 2020-02-18 PROCEDURE — 99215 OFFICE O/P EST HI 40 MIN: CPT | Mod: S$GLB,,, | Performed by: INTERNAL MEDICINE

## 2020-02-18 PROCEDURE — 3008F PR BODY MASS INDEX (BMI) DOCUMENTED: ICD-10-PCS | Mod: CPTII,S$GLB,, | Performed by: INTERNAL MEDICINE

## 2020-02-18 RX ORDER — FOLIC ACID 1 MG/1
1000 TABLET ORAL DAILY
Qty: 30 TABLET | Refills: 2 | Status: SHIPPED | OUTPATIENT
Start: 2020-02-18 | End: 2020-10-07

## 2020-02-18 RX ORDER — TRAMADOL HYDROCHLORIDE 50 MG/1
TABLET ORAL
Qty: 90 TABLET | Refills: 2
Start: 2020-02-18 | End: 2020-07-15

## 2020-02-18 ASSESSMENT — ROUTINE ASSESSMENT OF PATIENT INDEX DATA (RAPID3)
MDHAQ FUNCTION SCORE: .4
PAIN SCORE: 2
TOTAL RAPID3 SCORE: 2.11
PATIENT GLOBAL ASSESSMENT SCORE: 3
PSYCHOLOGICAL DISTRESS SCORE: 1.1

## 2020-02-18 NOTE — PROGRESS NOTES
Subjective:          Chief Complaint: Annita Bettencourt is a 49 y.o. female who had concerns including Rheumatoid Arthritis.    HPI:  Patient is a 48-year-old female here for consultation f/u sero+ high titer RA      She is currently on  Humira Q 2 weeks    MTX  25 mg once weekly   hydroxychloroquine 200 mgBID (since consultation as previously on once daily)   ibuprofen 800 mg t.i.d. p.r.n..    Tramadol 100mg TID PRN. - only using 100mg daily.   She has previously been on varying doses of prednisone PRN flares approx 1-2 monthly (50mg, 40mg, 30mg, 20mg and 10mg etc). None since 10/2019!    Patient has noted since starting Humira far fewer flares of her joints doing much better.  Unfortunately she is constant congestion/HA  with some shortness of breath since starting Humira.  Lost 20# off all red meat and sugar.   URI/congestion despite flonase and zyrtec daily. Did have sinusitis. Concern this is Humira.         +AM stiffness about 30min. Joint improve with activity.   The patient was diagnosed 2.5 years ago  Under serologies to review.  She has chronic dry eyes using systane. + dry mouth-  Patient currently has pain in her right shoulder right wrist of rates his pain at 2/10.    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 No MI  Still with sleep difficulty: Flexeril did not help. Doxepin 10mg not helping. Restless with sleep latency, stays asleep once down.    Component      Latest Ref Rng & Units 8/7/2018   NIL      See text IU/mL 0.050   TB1 - Nil      See text IU/mL -0.030   TB2 - Nil      See text IU/mL -0.030   Mitogen - Nil      See text IU/mL 8.140   TB Gold Plus       Negative   Hepatitis B Surface Ag       Negative   Hep B C IgM       Negative   Hep A IgM       Negative   Hepatitis C Ab       Negative   Rheumatoid Factor      0.0 - 15.0 IU/mL 397.0 (H)   CCP Antibodies      <5.0 U/mL 160.5 (H)   Sed Rate      0 - 20 mm/Hr 9   CRP      0.0 - 8.2 mg/L 1.9       REVIEW OF  SYSTEMS:    Review of Systems   Constitutional: Positive for malaise/fatigue. Negative for fever and weight loss.   HENT: Negative for sore throat.    Eyes: Negative for double vision, photophobia and redness.   Respiratory: Negative for cough, shortness of breath and wheezing.    Cardiovascular: Negative for chest pain, palpitations and orthopnea.   Gastrointestinal: Negative for abdominal pain, constipation and diarrhea.   Genitourinary: Negative for dysuria, hematuria and urgency.   Musculoskeletal: Positive for joint pain and myalgias. Negative for back pain.   Skin: Negative for rash.   Neurological: Negative for dizziness, tingling, focal weakness and headaches.   Endo/Heme/Allergies: Does not bruise/bleed easily.   Psychiatric/Behavioral: Negative for depression, hallucinations and suicidal ideas.               Objective:            Past Medical History:   Diagnosis Date    Acid reflux     Anxiety     Depression     Diabetes mellitus     Enlarged heart     Hypertension     Murmur     Rheumatoid arthritis      Family History   Problem Relation Age of Onset    Hypertension Father      Social History     Tobacco Use    Smoking status: Current Every Day Smoker     Packs/day: 0.25     Years: 25.00     Pack years: 6.25    Smokeless tobacco: Never Used   Substance Use Topics    Alcohol use: Yes     Comment: socially    Drug use: No         Current Outpatient Medications on File Prior to Visit   Medication Sig Dispense Refill    adalimumab (HUMIRA PEN) 40 mg/0.8 mL PnKt Inject 0.8 mLs (40 mg total) into the skin every 14 (fourteen) days. 2 pen 11    amlodipine (NORVASC) 10 MG tablet Take 10 mg by mouth once daily.      atenolol (TENORMIN) 25 MG tablet Take 25 mg by mouth once daily.  5    esomeprazole magnesium (NEXIUM 24HR) 20 mg TbEC Take 20 mg by mouth once daily.      fluoxetine (PROZAC) 40 MG capsule Take 40 mg by mouth once daily.      fluticasone propionate (FLONASE) 50 mcg/actuation nasal  spray 1 spray (50 mcg total) by Each Nare route once daily. 1 Bottle 3    folic acid (FOLVITE) 1 MG tablet Take 1 tablet (1,000 mcg total) by mouth once daily. 30 tablet 2    hydroxychloroquine (PLAQUENIL) 200 mg tablet Take 1 tablet (200 mg total) by mouth 2 (two) times daily. 60 tablet 3    ibuprofen (ADVIL,MOTRIN) 800 MG tablet TAKE 1 TABLET BY MOUTH 2 OR 3 TIMES A DAY AS NEEDED 90 tablet 2    losartan (COZAAR) 50 MG tablet Take 50 mg by mouth once daily.      metformin (GLUCOPHAGE) 500 MG tablet Take 500 mg by mouth once daily.      methotrexate 2.5 MG Tab TAKE 10 TABLETS BY MOUTH EVERY 7 DAYS 40 tablet 3    pravastatin (PRAVACHOL) 10 MG tablet Take 10 mg by mouth every evening.  5    traMADol (ULTRAM) 50 mg tablet TAKE 1 OR 2 TABLETS BY MOUTH EVERY 8 HOURS AS NEEDED FOR PAIN. Chronic pain >7 days medically necessary override dx code G89.4 180 tablet 2    traZODone (DESYREL) 50 MG tablet Take 1-2 tablets ( mg total) by mouth every evening. 60 tablet 11    turmeric/turmeric ext/pepr ext (TURMERIC-TURMERIC EXT-PEPPER) 500-3 mg Cap Take 1 capsule by mouth every evening.      HYDROcodone-acetaminophen (NORCO) 5-325 mg per tablet Take 1 tablet by mouth every 8 (eight) hours as needed for Pain (severe pain). 12 tablet 0    ondansetron (ZOFRAN) 4 MG tablet 4 mg.       No current facility-administered medications on file prior to visit.        There were no vitals filed for this visit.    Physical Exam:    Physical Exam   Constitutional: She is oriented to person, place, and time. She appears well-developed and well-nourished.   HENT:   Head: Normocephalic and atraumatic.   Mouth/Throat: Oropharynx is clear and moist.   Eyes: Pupils are equal, round, and reactive to light. EOM are normal.   Neck: Normal range of motion.   Cardiovascular: Normal rate, regular rhythm and normal heart sounds.   Pulmonary/Chest: Effort normal and breath sounds normal.   Musculoskeletal:        Right shoulder: She exhibits  decreased range of motion and tenderness. She exhibits no swelling.        Left shoulder: She exhibits normal range of motion, no tenderness and no swelling.        Right elbow: She exhibits normal range of motion and no swelling. No tenderness found.        Left elbow: She exhibits normal range of motion and no swelling. No tenderness found.        Right wrist: She exhibits decreased range of motion, tenderness and swelling.        Left wrist: She exhibits normal range of motion, no tenderness and no swelling.        Right hip: She exhibits decreased range of motion and tenderness.        Left hip: She exhibits decreased range of motion and tenderness.        Right knee: She exhibits normal range of motion and no swelling. No tenderness found.        Left knee: She exhibits normal range of motion and no swelling. No tenderness found.        Right hand: She exhibits tenderness. She exhibits normal range of motion and no swelling.        Left hand: She exhibits tenderness. She exhibits normal range of motion and no swelling.        Right foot: There is normal range of motion, no tenderness and no swelling.        Left foot: There is normal range of motion, no tenderness and no swelling.   PIP synovitismild decreased range of motion with  MCPs 2 3 some tenderness no swelling.  Wrist right with swelling decreased extension left wrist full range of motion no active synovitis.   tenderness in the shoulder pain on resisted abduction  Left with full range of motion on painful.   Neurological: She is alert and oriented to person, place, and time.   Skin: Skin is warm and dry.   Psychiatric: She has a normal mood and affect. Her behavior is normal.             Assessment:       Encounter Diagnoses   Name Primary?    Rheumatoid arthritis involving both hands with positive rheumatoid factor Yes    Immunosuppression           Plan:        Rheumatoid arthritis involving both hands with positive rheumatoid factor  -     CBC  auto differential; Standing; Expected date: 02/18/2020  -     Comprehensive metabolic panel; Standing; Expected date: 02/18/2020  -     Sedimentation rate; Standing; Expected date: 02/18/2020  -     C-reactive protein; Standing; Expected date: 02/18/2020  -     entanercept (ENBREL) 50 mg/mL injection; Inject 1 mL (50 mg total) into the skin once a week.  Dispense: 4 Syringe; Refill: 11  -     folic acid (FOLVITE) 1 MG tablet; Take 1 tablet (1,000 mcg total) by mouth once daily.  Dispense: 30 tablet; Refill: 2  -     traMADol (ULTRAM) 50 mg tablet; TAKE 1 OR 2 TABLETS BY MOUTH EVERY 8 HOURS AS NEEDED FOR PAIN. Chronic pain >7 days medically necessary override dx code G89.4  Dispense: 90 tablet; Refill: 2    Immunosuppression  -     CBC auto differential; Standing; Expected date: 02/18/2020  -     Comprehensive metabolic panel; Standing; Expected date: 02/18/2020  -     Sedimentation rate; Standing; Expected date: 02/18/2020  -     C-reactive protein; Standing; Expected date: 02/18/2020         Patient is a 48-year-old female with a history of rheumatoid arthritis managed on methotrexate and hydroxychloroquine 200 mg once daily.  For now I want her to increase her hydroxychloroquine to 200 mg b.i.d. continue methotrexate at 17.5 mg weekly.  I want her to get established for pre biologic labs in the event that we may consider using a biologic in the next 6 months.    Ok for prednisone if flares as you have done in past.     DC Humira  Start Enbrel Sureclick 50mg Qweekly  MTX 10 tabs weekly   HCQ 200mg BID-UTD 2019    Noting increase URI s/sx with Humira attempted flonase with zyrtec daily chronic rhinosinusitis     Samples of enbrel given x 3 to start 2/22/20 see if this helps.       Patient is aware of the risks benefits side effects and monitoring requirements of biologic therapy including but not limited to disseminated tuberculosis recurrent serious infections suppression of the immune system, injection site  reactions.  F/u 4 months.   Follow up in about 4 months (around 6/18/2020).      40min consultation with greater than 50% spent in counseling, chart review and coordination of care. All questions answered.

## 2020-02-18 NOTE — LETTER
2020                                 NAME:Annita Bettencourt  : 1970   MRN: [id number]     [facility]  [address] [place] [address]  Phone: [phone]  Fax: [phone]      [facility]  PAIN MANAGEMENT    PAIN MANAGEMENT CONTRACT      My doctor and I have decided that as part of my treatment for chronic pain, I will receive prescriptions for controlled substances. As a patient, I will agree to the following terms in order for my provider to effectively treat my pain and also comply with the rules set forth by [facility] Board of Medical Examiners and Drug Enforcement Agency.     1. A single physician shall be responsible for prescribing my pain medication.    2. I understand that in order for me to receive the best possible care, my prescribing doctor needs me to provide a copy of any of my previous copy of any previous medical records,including MRI, office notes, lab results, etc.    3. I will also provide a full list of ALL of my medications, current dose, and how often I take my medication. I must inform my doctor if I am taking any other medications, particularly sedating medications, such as Valium, Ativan, seizure medications, or psychiatric medications.    4. I will inform my physician of any current or prior history of abuse or misuse of prescription medication, illegal drugs, or alcohol.    5. I must not obtain controlled substances (including but not limited to, Xanax, Vicodin,Percocet, Tylenol #3,  ) from any other doctor without first telling my physician at this clinic.    6. I understand that my physician will assess the efficacy of my treatment with controlled substances and monitor my progress every twelve weeks, unless my physician, in his orher clinical judgment, determines otherwise.    7. It is my responsibility to keep my appointments. If I miss my scheduled appointment, I will be rescheduled to the first available time slot. I  "understand that pain medications may not be refilled until seen during a scheduled appointment.    8. I will take my medications only in the directed doses and manner and at the directed time. I will not deviate from my prescribed usage.    9. I will not combine these prescribed medications with alcohol or recreational medications,including, but not limited to, marijuana.    10. I will not drive or operate heavy machinery while on this medication.    11. I will not cut or chew long-acting pain medication.    12. I must inform my physician of any side effects that I may be experiencing.    13. If severe sedation (sleepiness) or any other medical emergency relating to my pain medication occurs, I will make contact with my doctors office or seek ER attention immediately.    14. If my doctor agrees to refill my pain medication by telephone, I will call the office at least 5 business days in advance to request a refill prescription.    15. Refill prescriptions will not be written in the evenings, weekends, or on holidays.    16. Each prescription is expected to last at least one month. Refills will not be given early if I"run out early", "lose a prescription", "spill" or "misplace" my medication. I will report stolen medications to the police.    17. No prescription refills will be given if I have not been seen by my physician in the clinic for 3 months.    18. It may be necessary for prescriptions to be picked up in person and proof of identification may be required.    19. I will have my pain medication filled at only one pharmacy.         And notify office of changes to said pharmacy.       20. A baseline medication screen may be completed on my first visit and or randomly at other routine clinic visits.    21. These medications may be harmful to an unborn child. I have been advised to use 2 forms of birth control (at least one barrier, such as condoms) while using these medications. I will inform my doctor " immediately if I become pregnant while on this medication.    22. If I test positive for medications that my doctor has not prescribed, and/or if I refuse a random medication test, my physician has the right to stop my controlled substance, end his/her relationship with me, and I may be terminated from the clinic.    23. If at any time I become violent or abusive, verbally or physically, my actions will be considered cause to terminate care from the clinic and discontinuation of pain medications.    I have read and understand the above information. I will, to the best of my ability, adhere to these policies and commitments. I further understand that noncompliance with these policies or demonstration of signs suspicious of medication overuse or abuse, may result in my being discharged as the patient.     I DO HEREBY AGREE AND UNDERSTAND ALL OF THE ABOVE. I HAVE RECEIVED A COPY OF THIS CONTROLLED SUBSTANCE TREATMENT ORIENTATION AND BEHAVIOR AGREEMENT.       Patient Signature:______________________________ Date/Time:________________    Patient Printed Name: Annita Bettencourt     Physician Signature:____________________________ Date/Time:________________    Physician Printed Name: Agnieszka Schwab DO    Witness Signature:_____________________________ Date/Time:________________    Witness Printed Name        No

## 2020-02-21 ENCOUNTER — TELEPHONE (OUTPATIENT)
Dept: PHARMACY | Facility: CLINIC | Age: 50
End: 2020-02-21

## 2020-02-21 NOTE — TELEPHONE ENCOUNTER
LVM for callback to inform patient that [facility] received prescription for Enbrel and prior authorization is required.  [facility] will be back in touch once insurance determination is received.

## 2020-02-29 ENCOUNTER — PATIENT MESSAGE (OUTPATIENT)
Dept: RHEUMATOLOGY | Facility: CLINIC | Age: 50
End: 2020-02-29

## 2020-03-02 ENCOUNTER — PATIENT MESSAGE (OUTPATIENT)
Dept: RHEUMATOLOGY | Facility: CLINIC | Age: 50
End: 2020-03-02

## 2020-03-02 ENCOUNTER — TELEPHONE (OUTPATIENT)
Dept: PHARMACY | Facility: CLINIC | Age: 50
End: 2020-03-02

## 2020-03-02 NOTE — TELEPHONE ENCOUNTER
refill attempt for enbrel, rejected due to the need of prior authorization. email has been forwarded to PCA team.  [facility] will follow up to schedule.Pt has not stated at this time. Pt was given samples from [facility], but became sick and did not inject.

## 2020-03-03 ENCOUNTER — TELEPHONE (OUTPATIENT)
Dept: PHARMACY | Facility: CLINIC | Age: 50
End: 2020-03-03

## 2020-03-03 NOTE — TELEPHONE ENCOUNTER
DOCUMENTATION ONLY:  Prior authorization for Enbrel 50 mg #4/28 approved from 03/02/2020 to 03/01/2021  Case ID: [id number]     Co-pay: $80.00     Patient Assistance IS required. Sending to the financial assistance team to investigate assistance options. Aida PRIDE

## 2020-03-05 ENCOUNTER — TELEPHONE (OUTPATIENT)
Dept: RHEUMATOLOGY | Facility: CLINIC | Age: 50
End: 2020-03-05

## 2020-03-06 NOTE — TELEPHONE ENCOUNTER
----- Message from Corina Dasilva PharmD sent at 3/2/2020 12:53 PM CST -----  Regarding: Enbrel  Good afternoon Dr. Schwab and Staff,    Ms. Bettencourt reported she started Enbrel sample on 2/23. She stated she developed infection and went to Urgent care Saturday, 2/29. She said she was prescribed medrol dose pack and 10 days of abx. She reported holding her Enbrel dose due 3/1 as a result.  Patient stated she has 2 samples in her fridge. She is holding until abx are complete and infection is resolved.  I just wanted to inform you in case further advisement is needed. Thank you.    Corina De La Cruz, PharmD  Clinical Pharmacist  [facility]  [phone]

## 2020-03-13 NOTE — TELEPHONE ENCOUNTER
3/3 - LVM for patient to notify her of Enbrel approval and to have her call [phone] to enroll in Enbrel Support. - RAC

## 2020-03-20 ENCOUNTER — TELEPHONE (OUTPATIENT)
Dept: PHARMACY | Facility: CLINIC | Age: 50
End: 2020-03-20

## 2020-03-20 NOTE — TELEPHONE ENCOUNTER
Spoke with patient briefly regarding Enbrel initial consult and fill 3/20. Patient denied consult as she started therapy with samples provided from [facility]. Patient stated she restarted Enbrel last , 3/8 after completing antibiotics and will continue with injections every .    Confirmed shipment 3/23 to arrive to patient home 3/24 address and  verified. $80 copay (004).

## 2020-04-13 ENCOUNTER — PATIENT MESSAGE (OUTPATIENT)
Dept: RHEUMATOLOGY | Facility: CLINIC | Age: 50
End: 2020-04-13

## 2020-04-13 ENCOUNTER — TELEPHONE (OUTPATIENT)
Dept: RHEUMATOLOGY | Facility: CLINIC | Age: 50
End: 2020-04-13

## 2020-04-13 NOTE — TELEPHONE ENCOUNTER
----- Message from Shital Chavez sent at 4/13/2020 12:42 PM CDT -----  Contact: [facility]  Type:  Pharmacy Calling to Clarify an RX    Name of Caller:  Mary  Pharmacy Name:  [facility]  What do they need to clarify?:  patient's Enbrel Sure Click pen malfunctioned and pharmacy is calling to see if they can get a verbal order for a new one.    Best Call Back Number:  [phone]  Additional Information:

## 2020-04-13 NOTE — TELEPHONE ENCOUNTER
Returned [facility] pharmacy regarding malfunction on pen. Provided ok to send a replacement for enbrel sure click.

## 2020-04-14 ENCOUNTER — TELEPHONE (OUTPATIENT)
Dept: PHARMACY | Facility: CLINIC | Age: 50
End: 2020-04-14

## 2020-04-17 ENCOUNTER — PATIENT MESSAGE (OUTPATIENT)
Dept: RHEUMATOLOGY | Facility: CLINIC | Age: 50
End: 2020-04-17

## 2020-04-17 DIAGNOSIS — M84.375A STRESS FRACTURE OF LEFT FOOT, INITIAL ENCOUNTER: ICD-10-CM

## 2020-04-17 DIAGNOSIS — M05.741 RHEUMATOID ARTHRITIS INVOLVING BOTH HANDS WITH POSITIVE RHEUMATOID FACTOR: Primary | ICD-10-CM

## 2020-04-17 DIAGNOSIS — M05.742 RHEUMATOID ARTHRITIS INVOLVING BOTH HANDS WITH POSITIVE RHEUMATOID FACTOR: Primary | ICD-10-CM

## 2020-04-21 ENCOUNTER — PATIENT MESSAGE (OUTPATIENT)
Dept: RHEUMATOLOGY | Facility: CLINIC | Age: 50
End: 2020-04-21

## 2020-04-23 ENCOUNTER — HOSPITAL ENCOUNTER (OUTPATIENT)
Dept: RADIOLOGY | Facility: HOSPITAL | Age: 50
Discharge: HOME | End: 2020-04-23
Attending: INTERNAL MEDICINE
Payer: COMMERCIAL

## 2020-04-23 DIAGNOSIS — M84.375A STRESS FRACTURE OF LEFT FOOT, INITIAL ENCOUNTER: ICD-10-CM

## 2020-04-23 DIAGNOSIS — M05.742 RHEUMATOID ARTHRITIS INVOLVING BOTH HANDS WITH POSITIVE RHEUMATOID FACTOR: ICD-10-CM

## 2020-04-23 DIAGNOSIS — M05.741 RHEUMATOID ARTHRITIS INVOLVING BOTH HANDS WITH POSITIVE RHEUMATOID FACTOR: ICD-10-CM

## 2020-04-23 PROCEDURE — 73630 XR FOOT COMPLETE 3 VIEW LEFT: ICD-10-PCS | Mod: 26,LT,, | Performed by: RADIOLOGY

## 2020-04-23 PROCEDURE — 73630 X-RAY EXAM OF FOOT: CPT | Mod: 26,LT,, | Performed by: RADIOLOGY

## 2020-04-23 PROCEDURE — 73630 X-RAY EXAM OF FOOT: CPT | Mod: TC,FY,PO,LT

## 2020-04-29 ENCOUNTER — PATIENT MESSAGE (OUTPATIENT)
Dept: RHEUMATOLOGY | Facility: CLINIC | Age: 50
End: 2020-04-29

## 2020-04-30 RX ORDER — PREDNISONE 5 MG/1
TABLET ORAL
Qty: 40 TABLET | Refills: 2 | Status: SHIPPED | OUTPATIENT
Start: 2020-04-30 | End: 2023-10-23

## 2020-04-30 NOTE — TELEPHONE ENCOUNTER
Please call patient there is no fracture. She does have a stable bone cyst seen on imaging from 2018 and stable so that should not be source of new pain.   No fractures are seen.     I want to try Prednisone 20mg x 2 days 15mg x 2 days , 10 mg x 2 days and stop to see if this will help.   Please let us know    Dr. Schwab

## 2020-05-01 NOTE — TELEPHONE ENCOUNTER
Spoke to pt. Informed no fracture, bone cyst seen on imaging from 2018 and stable so that should not be source of new pain. Start prednisone 20mg x 2 days, 15mg x 2 days, 10mg x 2 days and stop. verbalized understanding.

## 2020-05-04 RX ORDER — METHOTREXATE 2.5 MG/1
TABLET ORAL
Qty: 40 TABLET | Refills: 3 | Status: SHIPPED | OUTPATIENT
Start: 2020-05-04 | End: 2020-08-21

## 2020-05-06 DIAGNOSIS — M05.742 RHEUMATOID ARTHRITIS INVOLVING BOTH HANDS WITH POSITIVE RHEUMATOID FACTOR: ICD-10-CM

## 2020-05-06 DIAGNOSIS — M05.741 RHEUMATOID ARTHRITIS INVOLVING BOTH HANDS WITH POSITIVE RHEUMATOID FACTOR: ICD-10-CM

## 2020-05-07 RX ORDER — HYDROXYCHLOROQUINE SULFATE 200 MG/1
TABLET, FILM COATED ORAL
Qty: 60 TABLET | Refills: 3 | Status: SHIPPED | OUTPATIENT
Start: 2020-05-07 | End: 2020-09-10

## 2020-05-08 ENCOUNTER — TELEPHONE (OUTPATIENT)
Dept: PHARMACY | Facility: CLINIC | Age: 50
End: 2020-05-08

## 2020-05-09 ENCOUNTER — PATIENT MESSAGE (OUTPATIENT)
Dept: RHEUMATOLOGY | Facility: CLINIC | Age: 50
End: 2020-05-09

## 2020-06-11 ENCOUNTER — TELEPHONE (OUTPATIENT)
Dept: PHARMACY | Facility: CLINIC | Age: 50
End: 2020-06-11

## 2020-06-11 NOTE — TELEPHONE ENCOUNTER
Pt confirmed shipping of Enbrel on  to arrive on 20. Address and  verified. $80 copay in 004 Enbrel [facility]. Pt is in need of a new sharps container. Pt has 1 dose on hand, next dose due 20 . Pt reported no missed doses. Pt did not start any new medications. Pt had no further questions or concerns.

## 2020-06-20 ENCOUNTER — LAB VISIT (OUTPATIENT)
Dept: LAB | Facility: HOSPITAL | Age: 50
End: 2020-06-20
Attending: INTERNAL MEDICINE
Payer: COMMERCIAL

## 2020-06-20 DIAGNOSIS — D84.9 IMMUNOSUPPRESSION: ICD-10-CM

## 2020-06-20 DIAGNOSIS — M05.741 RHEUMATOID ARTHRITIS INVOLVING BOTH HANDS WITH POSITIVE RHEUMATOID FACTOR: ICD-10-CM

## 2020-06-20 DIAGNOSIS — M05.742 RHEUMATOID ARTHRITIS INVOLVING BOTH HANDS WITH POSITIVE RHEUMATOID FACTOR: ICD-10-CM

## 2020-06-20 LAB
ALBUMIN SERPL BCP-MCNC: 3.8 G/DL (ref 3.5–5.2)
ALP SERPL-CCNC: 64 U/L (ref 55–135)
ALT SERPL W/O P-5'-P-CCNC: 21 U/L (ref 10–44)
ANION GAP SERPL CALC-SCNC: 8 MMOL/L (ref 8–16)
AST SERPL-CCNC: 16 U/L (ref 10–40)
BASOPHILS # BLD AUTO: 0.09 K/UL (ref 0–0.2)
BASOPHILS NFR BLD: 1.3 % (ref 0–1.9)
BILIRUB SERPL-MCNC: 0.2 MG/DL (ref 0.1–1)
BUN SERPL-MCNC: 12 MG/DL (ref 6–20)
CALCIUM SERPL-MCNC: 9.6 MG/DL (ref 8.7–10.5)
CHLORIDE SERPL-SCNC: 106 MMOL/L (ref 95–110)
CO2 SERPL-SCNC: 27 MMOL/L (ref 23–29)
CREAT SERPL-MCNC: 0.8 MG/DL (ref 0.5–1.4)
CRP SERPL-MCNC: 3.7 MG/L (ref 0–8.2)
DIFFERENTIAL METHOD: ABNORMAL
EOSINOPHIL # BLD AUTO: 0.3 K/UL (ref 0–0.5)
EOSINOPHIL NFR BLD: 4.4 % (ref 0–8)
ERYTHROCYTE [DISTWIDTH] IN BLOOD BY AUTOMATED COUNT: 16.6 % (ref 11.5–14.5)
ERYTHROCYTE [SEDIMENTATION RATE] IN BLOOD BY WESTERGREN METHOD: 12 MM/HR (ref 0–20)
EST. GFR  (AFRICAN AMERICAN): >60 ML/MIN/1.73 M^2
EST. GFR  (NON AFRICAN AMERICAN): >60 ML/MIN/1.73 M^2
GLUCOSE SERPL-MCNC: 94 MG/DL (ref 70–110)
HCT VFR BLD AUTO: 39.3 % (ref 37–48.5)
HGB BLD-MCNC: 11.6 G/DL (ref 12–16)
IMM GRANULOCYTES # BLD AUTO: 0.06 K/UL (ref 0–0.04)
IMM GRANULOCYTES NFR BLD AUTO: 0.9 % (ref 0–0.5)
LYMPHOCYTES # BLD AUTO: 1.8 K/UL (ref 1–4.8)
LYMPHOCYTES NFR BLD: 26 % (ref 18–48)
MCH RBC QN AUTO: 26.3 PG (ref 27–31)
MCHC RBC AUTO-ENTMCNC: 29.5 G/DL (ref 32–36)
MCV RBC AUTO: 89 FL (ref 82–98)
MONOCYTES # BLD AUTO: 0.6 K/UL (ref 0.3–1)
MONOCYTES NFR BLD: 9.5 % (ref 4–15)
NEUTROPHILS # BLD AUTO: 3.9 K/UL (ref 1.8–7.7)
NEUTROPHILS NFR BLD: 57.9 % (ref 38–73)
NRBC BLD-RTO: 0 /100 WBC
PLATELET # BLD AUTO: 288 K/UL (ref 150–350)
PMV BLD AUTO: 11.9 FL (ref 9.2–12.9)
POTASSIUM SERPL-SCNC: 4.5 MMOL/L (ref 3.5–5.1)
PROT SERPL-MCNC: 6.8 G/DL (ref 6–8.4)
RBC # BLD AUTO: 4.41 M/UL (ref 4–5.4)
SODIUM SERPL-SCNC: 141 MMOL/L (ref 136–145)
WBC # BLD AUTO: 6.77 K/UL (ref 3.9–12.7)

## 2020-06-20 PROCEDURE — 85651 RBC SED RATE NONAUTOMATED: CPT | Mod: PO

## 2020-06-20 PROCEDURE — 86140 C-REACTIVE PROTEIN: CPT

## 2020-06-20 PROCEDURE — 80053 COMPREHEN METABOLIC PANEL: CPT

## 2020-06-20 PROCEDURE — 85025 COMPLETE CBC W/AUTO DIFF WBC: CPT

## 2020-06-20 PROCEDURE — 36415 COLL VENOUS BLD VENIPUNCTURE: CPT | Mod: PO

## 2020-06-25 ENCOUNTER — OFFICE VISIT (OUTPATIENT)
Dept: RHEUMATOLOGY | Facility: CLINIC | Age: 50
End: 2020-06-25
Payer: COMMERCIAL

## 2020-06-25 DIAGNOSIS — D84.9 IMMUNOSUPPRESSION: ICD-10-CM

## 2020-06-25 DIAGNOSIS — M05.741 RHEUMATOID ARTHRITIS INVOLVING BOTH HANDS WITH POSITIVE RHEUMATOID FACTOR: Primary | ICD-10-CM

## 2020-06-25 DIAGNOSIS — M05.742 RHEUMATOID ARTHRITIS INVOLVING BOTH HANDS WITH POSITIVE RHEUMATOID FACTOR: Primary | ICD-10-CM

## 2020-06-25 PROCEDURE — 99214 PR OFFICE/OUTPT VISIT, EST, LEVL IV, 30-39 MIN: ICD-10-PCS | Mod: S$GLB,,, | Performed by: INTERNAL MEDICINE

## 2020-06-25 PROCEDURE — 99214 OFFICE O/P EST MOD 30 MIN: CPT | Mod: S$GLB,,, | Performed by: INTERNAL MEDICINE

## 2020-06-25 PROCEDURE — 99999 PR PBB SHADOW E&M-EST. PATIENT-LVL III: ICD-10-PCS | Mod: PBBFAC,,, | Performed by: INTERNAL MEDICINE

## 2020-06-25 PROCEDURE — 99999 PR PBB SHADOW E&M-EST. PATIENT-LVL III: CPT | Mod: PBBFAC,,, | Performed by: INTERNAL MEDICINE

## 2020-06-25 ASSESSMENT — ROUTINE ASSESSMENT OF PATIENT INDEX DATA (RAPID3)
PATIENT GLOBAL ASSESSMENT SCORE: 3
FATIGUE SCORE: 2.2
MDHAQ FUNCTION SCORE: 0.6
TOTAL RAPID3 SCORE: 3.33
PSYCHOLOGICAL DISTRESS SCORE: 1.1
PAIN SCORE: 5

## 2020-06-25 NOTE — PROGRESS NOTES
Subjective:          Chief Complaint: Annita Bettencourt is a 50 y.o. female who had no chief complaint listed for this encounter.    HPI:  Patient is a 48-year-old female here for consultation f/u sero+ high titer RA      She is currently on  Enbrel (no URI s/sx but not as efficacious as yet at Humira for her joints).    MTX  25 mg once weekly   hydroxychloroquine 200 mgBID (since consultation as previously on once daily)   ibuprofen 800 mg t.i.d. p.r.n..    Tramadol 100mg TID PRN. - only using 100mg daily.   She has previously been on varying doses of prednisone PRN flares approx 1-2 monthly (50mg, 40mg, 30mg, 20mg and 10mg etc). None since 10/2019!    Previous medication:   Humira- congestion/HA .    Lost 20# off all red meat and sugar.   URI/congestion despite flonase and zyrtec daily. Did have sinusitis. Concern this is Humira.         +AM stiffness about 30min. Joint improve with activity.   The patient was diagnosed 2.5 years ago  Under serologies to review.  She has chronic dry eyes using systane. + dry mouth-  Patient currently has pain in her right shoulder right wrist of rates his pain at 2/10.    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 No MI  Still with sleep difficulty: Flexeril did not help. Doxepin 10mg not helping. Restless with sleep latency, stays asleep once down.    Component      Latest Ref Rng & Units 8/7/2018   NIL      See text IU/mL 0.050   TB1 - Nil      See text IU/mL -0.030   TB2 - Nil      See text IU/mL -0.030   Mitogen - Nil      See text IU/mL 8.140   TB Gold Plus       Negative   Hepatitis B Surface Ag       Negative   Hep B C IgM       Negative   Hep A IgM       Negative   Hepatitis C Ab       Negative   Rheumatoid Factor      0.0 - 15.0 IU/mL 397.0 (H)   CCP Antibodies      <5.0 U/mL 160.5 (H)   Sed Rate      0 - 20 mm/Hr 9   CRP      0.0 - 8.2 mg/L 1.9       REVIEW OF SYSTEMS:    Review of Systems   Constitutional: Positive for malaise/fatigue. Negative for  fever and weight loss.   HENT: Negative for sore throat.    Eyes: Negative for double vision, photophobia and redness.   Respiratory: Negative for cough, shortness of breath and wheezing.    Cardiovascular: Negative for chest pain, palpitations and orthopnea.   Gastrointestinal: Negative for abdominal pain, constipation and diarrhea.   Genitourinary: Negative for dysuria, hematuria and urgency.   Musculoskeletal: Positive for joint pain and myalgias. Negative for back pain.   Skin: Negative for rash.   Neurological: Negative for dizziness, tingling, focal weakness and headaches.   Endo/Heme/Allergies: Does not bruise/bleed easily.   Psychiatric/Behavioral: Negative for depression, hallucinations and suicidal ideas.               Objective:            Past Medical History:   Diagnosis Date    Acid reflux     Anxiety     Depression     Diabetes mellitus     Enlarged heart     Hypertension     Murmur     Rheumatoid arthritis      Family History   Problem Relation Age of Onset    Hypertension Father      Social History     Tobacco Use    Smoking status: Current Every Day Smoker     Packs/day: 0.25     Years: 25.00     Pack years: 6.25    Smokeless tobacco: Never Used   Substance Use Topics    Alcohol use: Yes     Comment: socially    Drug use: No         Current Outpatient Medications on File Prior to Visit   Medication Sig Dispense Refill    amlodipine (NORVASC) 10 MG tablet Take 10 mg by mouth once daily.      atenolol (TENORMIN) 25 MG tablet Take 25 mg by mouth once daily.  5    entanercept (ENBREL) 50 mg/mL injection Inject 1 mL (50 mg total) into the skin once a week. 4 mL 11    esomeprazole magnesium (NEXIUM 24HR) 20 mg TbEC Take 20 mg by mouth once daily.      fluoxetine (PROZAC) 40 MG capsule Take 40 mg by mouth once daily.      fluticasone propionate (FLONASE) 50 mcg/actuation nasal spray 1 spray (50 mcg total) by Each Nare route once daily. 1 Bottle 3    folic acid (FOLVITE) 1 MG tablet  Take 1 tablet (1,000 mcg total) by mouth once daily. 30 tablet 2    hydroxychloroquine (PLAQUENIL) 200 mg tablet TAKE 1 TABLET BY MOUTH TWICE DAILY 60 tablet 3    ibuprofen (ADVIL,MOTRIN) 800 MG tablet TAKE 1 TABLET BY MOUTH 2 OR 3 TIMES A DAY AS NEEDED 90 tablet 2    losartan (COZAAR) 50 MG tablet Take 50 mg by mouth once daily.      metformin (GLUCOPHAGE) 500 MG tablet Take 500 mg by mouth once daily.      methotrexate 2.5 MG Tab TAKE 10 TABLETS BY MOUTH EVERY 7 DAYS 40 tablet 3    ondansetron (ZOFRAN) 4 MG tablet 4 mg.      pravastatin (PRAVACHOL) 10 MG tablet Take 10 mg by mouth every evening.  5    predniSONE (DELTASONE) 5 MG tablet 20mg x 2 days, then 15mg x 2 days, then 10mg x 2 days then stop; may repeat x 1 40 tablet 2    traMADol (ULTRAM) 50 mg tablet TAKE 1 OR 2 TABLETS BY MOUTH EVERY 8 HOURS AS NEEDED FOR PAIN. Chronic pain >7 days medically necessary override dx code G89.4 90 tablet 2    traZODone (DESYREL) 50 MG tablet TAKE 1 OR 2 TABLETS BY MOUTH EVERY EVENING 60 tablet 11    turmeric/turmeric ext/pepr ext (TURMERIC-TURMERIC EXT-PEPPER) 500-3 mg Cap Take 1 capsule by mouth every evening.       No current facility-administered medications on file prior to visit.        There were no vitals filed for this visit.    Physical Exam:    Physical Exam  Constitutional:       Appearance: She is well-developed.   HENT:      Head: Normocephalic and atraumatic.   Eyes:      Pupils: Pupils are equal, round, and reactive to light.   Neck:      Musculoskeletal: Normal range of motion.   Cardiovascular:      Rate and Rhythm: Normal rate and regular rhythm.      Heart sounds: Normal heart sounds.   Pulmonary:      Effort: Pulmonary effort is normal.      Breath sounds: Normal breath sounds.   Musculoskeletal:      Right shoulder: She exhibits decreased range of motion and tenderness. She exhibits no swelling.      Left shoulder: She exhibits normal range of motion, no tenderness and no swelling.      Right  elbow: She exhibits normal range of motion and no swelling. No tenderness found.      Left elbow: She exhibits normal range of motion and no swelling. No tenderness found.      Right wrist: She exhibits decreased range of motion, tenderness and swelling.      Left wrist: She exhibits normal range of motion, no tenderness and no swelling.      Right hip: She exhibits decreased range of motion and tenderness.      Left hip: She exhibits decreased range of motion and tenderness.      Right knee: She exhibits normal range of motion and no swelling. No tenderness found.      Left knee: She exhibits normal range of motion and no swelling. No tenderness found.      Right hand: She exhibits tenderness. She exhibits normal range of motion and no swelling.      Left hand: She exhibits tenderness. She exhibits normal range of motion and no swelling.      Right foot: Normal range of motion. No tenderness or swelling.      Left foot: Normal range of motion. No tenderness or swelling.      Comments: PIP synovitismild decreased range of motion with  MCPs 2 3 some tenderness no swelling.  Wrist right with swelling decreased extension left wrist full range of motion no active synovitis.   tenderness in the shoulder pain on resisted abduction  Left with full range of motion on painful.   Skin:     General: Skin is warm and dry.   Neurological:      Mental Status: She is alert and oriented to person, place, and time.   Psychiatric:         Behavior: Behavior normal.               Assessment:       Encounter Diagnoses   Name Primary?    Rheumatoid arthritis involving both hands with positive rheumatoid factor Yes    Immunosuppression           Plan:        Rheumatoid arthritis involving both hands with positive rheumatoid factor    Immunosuppression         Patient is a 50-year-old female with a history of rheumatoid arthritis managed on methotrexate and hydroxychloroquine 200 mg once daily.  For now I want her to increase her  hydroxychloroquine to 200 mg b.i.d. continue methotrexate at 17.5 mg weekly.  I want her to get established for pre biologic labs in the event that we may consider using a biologic in the next 6 months.    Ok for prednisone if flares as you have done in past.     Failed Humira with URI/HA  Started Enbrel Sureclick 50mg Qweekly ( 3/21/20 start date. ) still not at full efficacy. Await 6 more weeks if not improved re switch to new biologic.   MTX 10 tabs weekly   HCQ 200mg BID-UTD 2019    No URI s/sx with Enbrel.   Notes few days prior to Enbrel breakthrough joint aches and pains are significant.   Flare in foot 4/2020 did resolve with prednisone.         Patient is aware of the risks benefits side effects and monitoring requirements of biologic therapy including but not limited to disseminated tuberculosis recurrent serious infections suppression of the immune system, injection site reactions.  F/u 4 months.   No follow-ups on file.      30min consultation with greater than 50% spent in counseling, chart review and coordination of care. All questions answered.

## 2020-07-10 ENCOUNTER — TELEPHONE (OUTPATIENT)
Dept: PHARMACY | Facility: CLINIC | Age: 50
End: 2020-07-10

## 2020-07-10 NOTE — TELEPHONE ENCOUNTER
RX outgoing call regarding [facility] @ [facility]. Shipping out 7/15 for  arrival with patients consent. Copay of $80.00 [facility] [id number] @ 004. Address and  confirmed. Patient has 1 dose on hand at this time. Patient has not started any new medications, has had no missed doses and no side effects present. Patient is currently taking the medication as directed by doctors instruction. Patient states they do not have any questions or concerns at this time. Patients next injection is scheduled for . No sharps container needed at this time

## 2020-08-08 ENCOUNTER — TELEPHONE (OUTPATIENT)
Dept: PHARMACY | Facility: CLINIC | Age: 50
End: 2020-08-08

## 2020-08-22 NOTE — TELEPHONE ENCOUNTER
I don't see evidence (althought limited in photos) that this is an infection. We need to get and x ray to r/o for fracture in the foot.     Dr. Schwab    Patient tachypneic and febrile to 100.8, with erythema around R chest mediport  - Likely 2/2 mediport infection  - CTA unremarkable. No PE  - UA clean  - f/u BCx, UCx  - s/p Vanc and Zosyn  - C/w Vanc and Zosyn  - Consult ID in AM  - Consult Vascular Surgery in AM  - Pain control with Oxycodone and Tylenol

## 2020-09-08 ENCOUNTER — TELEPHONE (OUTPATIENT)
Dept: PHARMACY | Facility: CLINIC | Age: 50
End: 2020-09-08

## 2020-09-10 ENCOUNTER — TELEPHONE (OUTPATIENT)
Dept: PHARMACY | Facility: CLINIC | Age: 50
End: 2020-09-10

## 2020-09-10 NOTE — TELEPHONE ENCOUNTER
Pt confirmed shipping of Enbrel on  to arrive on 9/15/20. Address and  verified. $80 copay in 004 enbrel ccof. Pt declined new sharps container. Pt has 1 dose on hand, next dose due 20. Pt reported 1 missed dose d/t forgetfulness. Missed dose counseling reviewed- pt encouraged to continue with phone alarm for dose reminders and reiterated importance of medication adherence to treatment plan. Pt voiced understanding. Pt did not start any new medications. Pt had no further questions or concerns.

## 2020-09-23 ENCOUNTER — PATIENT MESSAGE (OUTPATIENT)
Dept: RHEUMATOLOGY | Facility: CLINIC | Age: 50
End: 2020-09-23

## 2020-09-23 NOTE — TELEPHONE ENCOUNTER
DOCUMENTATION ONLY:  Humira renewal prior authorization approved x 12 fills  Dates: 6/3/19 through 6/2/20  Case ID: [id number]     Paramedian Forehead Flap Text: A decision was made to reconstruct the defect utilizing an interpolation axial flap and a staged reconstruction.  A telfa template was made of the defect.  This telfa template was then used to outline the paramedian forehead pedicle flap.  The donor area for the pedicle flap was then injected with anesthesia.  The flap was excised through the skin and subcutaneous tissue down to the layer of the underlying musculature.  The pedicle flap was carefully excised within this deep plane to maintain its blood supply.  The edges of the donor site were undermined.   The donor site was closed in a primary fashion.  The pedicle was then rotated into position and sutured.  Once the tube was sutured into place, adequate blood supply was confirmed with blanching and refill.  The pedicle was then wrapped with xeroform gauze and dressed appropriately with a telfa and gauze bandage to ensure continued blood supply and protect the attached pedicle.

## 2020-09-24 NOTE — TELEPHONE ENCOUNTER
Stop Enbrel  Stop methotrexate  Continue plaquenil for now.   Try to hold off on prednisone unless given by PCP or Urgent care.     We stay off above until completely recovered and feeling much better. minimun 2 weeks can be longer depending   Please keep up updated on how she is feeling.   Staff I sent portal message just check that it was read by patient. if not  Then call.   Dr. Schwab

## 2020-10-09 ENCOUNTER — PATIENT MESSAGE (OUTPATIENT)
Dept: RHEUMATOLOGY | Facility: CLINIC | Age: 50
End: 2020-10-09

## 2020-10-14 NOTE — TELEPHONE ENCOUNTER
Enbrel refill (attempt #2) and clinical follow up (attempt #6) - [facility] for call back. Per chart review, Dr. Schwab stated that patient can restart Enbrel as long as she has no residual cough or shortness of breath. Need to assess if patient restarted and if so how many doses she has left on hand.

## 2020-10-19 ENCOUNTER — SPECIALTY PHARMACY (OUTPATIENT)
Dept: PHARMACY | Facility: CLINIC | Age: 50
End: 2020-10-19

## 2020-10-19 DIAGNOSIS — M06.9 RHEUMATOID ARTHRITIS, INVOLVING UNSPECIFIED SITE, UNSPECIFIED WHETHER RHEUMATOID FACTOR PRESENT: ICD-10-CM

## 2020-10-19 NOTE — TELEPHONE ENCOUNTER
Pt held Enbrel doses d/t recent COVID19 dx. She was cleared by her rheumatologist to resume treatment and pt reports she resumed Enbrel injections yesterday 10/18/20 and has 3 pens left. Pending RF call 2 weeks. Pt would like to complete reassessment with RF activity in 2 weeks.

## 2020-10-24 ENCOUNTER — LAB VISIT (OUTPATIENT)
Dept: LAB | Facility: HOSPITAL | Age: 50
End: 2020-10-24
Attending: INTERNAL MEDICINE
Payer: COMMERCIAL

## 2020-10-24 DIAGNOSIS — M05.741 RHEUMATOID ARTHRITIS INVOLVING BOTH HANDS WITH POSITIVE RHEUMATOID FACTOR: ICD-10-CM

## 2020-10-24 DIAGNOSIS — M05.742 RHEUMATOID ARTHRITIS INVOLVING BOTH HANDS WITH POSITIVE RHEUMATOID FACTOR: ICD-10-CM

## 2020-10-24 DIAGNOSIS — D84.9 IMMUNOSUPPRESSION: ICD-10-CM

## 2020-10-24 LAB
ALBUMIN SERPL BCP-MCNC: 3.7 G/DL (ref 3.5–5.2)
ALP SERPL-CCNC: 63 U/L (ref 55–135)
ALT SERPL W/O P-5'-P-CCNC: 19 U/L (ref 10–44)
ANION GAP SERPL CALC-SCNC: 8 MMOL/L (ref 8–16)
AST SERPL-CCNC: 13 U/L (ref 10–40)
BASOPHILS # BLD AUTO: 0.1 K/UL (ref 0–0.2)
BASOPHILS NFR BLD: 1.4 % (ref 0–1.9)
BILIRUB SERPL-MCNC: 0.2 MG/DL (ref 0.1–1)
BUN SERPL-MCNC: 14 MG/DL (ref 6–20)
CALCIUM SERPL-MCNC: 9.4 MG/DL (ref 8.7–10.5)
CHLORIDE SERPL-SCNC: 103 MMOL/L (ref 95–110)
CO2 SERPL-SCNC: 27 MMOL/L (ref 23–29)
CREAT SERPL-MCNC: 0.9 MG/DL (ref 0.5–1.4)
CRP SERPL-MCNC: 2 MG/L (ref 0–8.2)
DIFFERENTIAL METHOD: ABNORMAL
EOSINOPHIL # BLD AUTO: 0.5 K/UL (ref 0–0.5)
EOSINOPHIL NFR BLD: 6.4 % (ref 0–8)
ERYTHROCYTE [DISTWIDTH] IN BLOOD BY AUTOMATED COUNT: 17.7 % (ref 11.5–14.5)
ERYTHROCYTE [SEDIMENTATION RATE] IN BLOOD BY WESTERGREN METHOD: 15 MM/HR (ref 0–36)
EST. GFR  (AFRICAN AMERICAN): >60 ML/MIN/1.73 M^2
EST. GFR  (NON AFRICAN AMERICAN): >60 ML/MIN/1.73 M^2
GLUCOSE SERPL-MCNC: 95 MG/DL (ref 70–110)
HCT VFR BLD AUTO: 40 % (ref 37–48.5)
HGB BLD-MCNC: 12 G/DL (ref 12–16)
IMM GRANULOCYTES # BLD AUTO: 0.05 K/UL (ref 0–0.04)
IMM GRANULOCYTES NFR BLD AUTO: 0.7 % (ref 0–0.5)
LYMPHOCYTES # BLD AUTO: 1.9 K/UL (ref 1–4.8)
LYMPHOCYTES NFR BLD: 26.5 % (ref 18–48)
MCH RBC QN AUTO: 26.2 PG (ref 27–31)
MCHC RBC AUTO-ENTMCNC: 30 G/DL (ref 32–36)
MCV RBC AUTO: 87 FL (ref 82–98)
MONOCYTES # BLD AUTO: 0.7 K/UL (ref 0.3–1)
MONOCYTES NFR BLD: 9.5 % (ref 4–15)
NEUTROPHILS # BLD AUTO: 4.1 K/UL (ref 1.8–7.7)
NEUTROPHILS NFR BLD: 55.5 % (ref 38–73)
NRBC BLD-RTO: 0 /100 WBC
PLATELET # BLD AUTO: 258 K/UL (ref 150–350)
PMV BLD AUTO: 11.7 FL (ref 9.2–12.9)
POTASSIUM SERPL-SCNC: 4.4 MMOL/L (ref 3.5–5.1)
PROT SERPL-MCNC: 6.7 G/DL (ref 6–8.4)
RBC # BLD AUTO: 4.58 M/UL (ref 4–5.4)
SODIUM SERPL-SCNC: 138 MMOL/L (ref 136–145)
WBC # BLD AUTO: 7.29 K/UL (ref 3.9–12.7)

## 2020-10-24 PROCEDURE — 86140 C-REACTIVE PROTEIN: CPT

## 2020-10-24 PROCEDURE — 85025 COMPLETE CBC W/AUTO DIFF WBC: CPT

## 2020-10-24 PROCEDURE — 36415 COLL VENOUS BLD VENIPUNCTURE: CPT | Mod: PO

## 2020-10-24 PROCEDURE — 85652 RBC SED RATE AUTOMATED: CPT

## 2020-10-24 PROCEDURE — 80053 COMPREHEN METABOLIC PANEL: CPT

## 2020-10-29 ENCOUNTER — OFFICE VISIT (OUTPATIENT)
Dept: RHEUMATOLOGY | Facility: CLINIC | Age: 50
End: 2020-10-29
Payer: COMMERCIAL

## 2020-10-29 ENCOUNTER — PATIENT MESSAGE (OUTPATIENT)
Dept: RHEUMATOLOGY | Facility: CLINIC | Age: 50
End: 2020-10-29

## 2020-10-29 VITALS — BODY MASS INDEX: 42.76 KG/M2 | WEIGHT: 232.38 LBS | HEIGHT: 62 IN

## 2020-10-29 DIAGNOSIS — M05.741 RHEUMATOID ARTHRITIS INVOLVING BOTH HANDS WITH POSITIVE RHEUMATOID FACTOR: Primary | ICD-10-CM

## 2020-10-29 DIAGNOSIS — M05.742 RHEUMATOID ARTHRITIS INVOLVING BOTH HANDS WITH POSITIVE RHEUMATOID FACTOR: Primary | ICD-10-CM

## 2020-10-29 DIAGNOSIS — U07.1 2019 NOVEL CORONAVIRUS DISEASE (COVID-19): ICD-10-CM

## 2020-10-29 DIAGNOSIS — D84.9 IMMUNOSUPPRESSION: ICD-10-CM

## 2020-10-29 PROCEDURE — 3008F BODY MASS INDEX DOCD: CPT | Mod: CPTII,,, | Performed by: INTERNAL MEDICINE

## 2020-10-29 PROCEDURE — 99214 OFFICE O/P EST MOD 30 MIN: CPT | Mod: 95,,, | Performed by: INTERNAL MEDICINE

## 2020-10-29 PROCEDURE — 99214 PR OFFICE/OUTPT VISIT, EST, LEVL IV, 30-39 MIN: ICD-10-PCS | Mod: 95,,, | Performed by: INTERNAL MEDICINE

## 2020-10-29 PROCEDURE — 3008F PR BODY MASS INDEX (BMI) DOCUMENTED: ICD-10-PCS | Mod: CPTII,,, | Performed by: INTERNAL MEDICINE

## 2020-10-29 RX ORDER — FOLIC ACID 1 MG/1
1000 TABLET ORAL DAILY
Qty: 30 TABLET | Refills: 2 | Status: SHIPPED | OUTPATIENT
Start: 2020-10-29 | End: 2021-01-27

## 2020-10-29 RX ORDER — TRAMADOL HYDROCHLORIDE 50 MG/1
TABLET ORAL
Qty: 180 TABLET | Refills: 2 | Status: SHIPPED | OUTPATIENT
Start: 2020-10-29 | End: 2021-07-26

## 2020-10-29 RX ORDER — METHOTREXATE 2.5 MG/1
25 TABLET ORAL
Qty: 40 TABLET | Refills: 3 | Status: SHIPPED | OUTPATIENT
Start: 2020-10-29 | End: 2021-02-19

## 2020-10-29 RX ORDER — IBUPROFEN 800 MG/1
TABLET ORAL
Qty: 90 TABLET | Refills: 2 | Status: SHIPPED | OUTPATIENT
Start: 2020-10-29 | End: 2021-04-29 | Stop reason: SDUPTHER

## 2020-10-29 NOTE — PROGRESS NOTES
Subjective:          Chief Complaint: Annita Bettencourt is a 50 y.o. female who had concerns including Disease Management.    HPI:  Patient is a 48-year-old female here for consultation f/u sero+ high titer RA  COVID 1 month ago. Hands at MCP and wrist.     She is currently on  Enbrel (no URI s/sx but not as efficacious as yet at Humira for her joints).    MTX  25 mg once weekly   hydroxychloroquine 200 mgBID (since consultation as previously on once daily)   ibuprofen 800 mg t.i.d. p.r.n..    Tramadol 100mg TID PRN. - only using 100mg daily.   She has previously been on varying doses of prednisone PRN flares approx 1-2 monthly (50mg, 40mg, 30mg, 20mg and 10mg etc). None since 10/2019!    Previous medication:   Humira- congestion/HA .    Lost 20# off all red meat and sugar.   URI/congestion despite flonase and zyrtec daily. Did have sinusitis. Concern this is Humira.         +AM stiffness about 30min. Joint improve with activity.   The patient was diagnosed 2.5 years ago  Under serologies to review.  She has chronic dry eyes using systane. + dry mouth-  Patient currently has pain in her right shoulder right wrist of rates his pain at 2/10.    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 No MI  Still with sleep difficulty: Flexeril did not help. Doxepin 10mg not helping. Restless with sleep latency, stays asleep once down.    Component      Latest Ref Rng & Units 8/7/2018   NIL      See text IU/mL 0.050   TB1 - Nil      See text IU/mL -0.030   TB2 - Nil      See text IU/mL -0.030   Mitogen - Nil      See text IU/mL 8.140   TB Gold Plus       Negative   Hepatitis B Surface Ag       Negative   Hep B C IgM       Negative   Hep A IgM       Negative   Hepatitis C Ab       Negative   Rheumatoid Factor      0.0 - 15.0 IU/mL 397.0 (H)   CCP Antibodies      <5.0 U/mL 160.5 (H)   Sed Rate      0 - 20 mm/Hr 9   CRP      0.0 - 8.2 mg/L 1.9       REVIEW OF SYSTEMS:    Review of Systems   Constitutional: Positive  for malaise/fatigue. Negative for fever and weight loss.   HENT: Negative for sore throat.    Eyes: Negative for double vision, photophobia and redness.   Respiratory: Negative for cough, shortness of breath and wheezing.    Cardiovascular: Negative for chest pain, palpitations and orthopnea.   Gastrointestinal: Negative for abdominal pain, constipation and diarrhea.   Genitourinary: Negative for dysuria, hematuria and urgency.   Musculoskeletal: Positive for joint pain and myalgias. Negative for back pain.   Skin: Negative for rash.   Neurological: Negative for dizziness, tingling, focal weakness and headaches.   Endo/Heme/Allergies: Does not bruise/bleed easily.   Psychiatric/Behavioral: Negative for depression, hallucinations and suicidal ideas.               Objective:            Past Medical History:   Diagnosis Date    Acid reflux     Anxiety     Depression     Diabetes mellitus     Enlarged heart     Hypertension     Murmur     Rheumatoid arthritis      Family History   Problem Relation Age of Onset    Hypertension Father      Social History     Tobacco Use    Smoking status: Current Every Day Smoker     Packs/day: 0.25     Years: 25.00     Pack years: 6.25    Smokeless tobacco: Never Used   Substance Use Topics    Alcohol use: Yes     Comment: socially    Drug use: No         Current Outpatient Medications on File Prior to Visit   Medication Sig Dispense Refill    amlodipine (NORVASC) 10 MG tablet Take 10 mg by mouth once daily.      atenolol (TENORMIN) 25 MG tablet Take 25 mg by mouth once daily.  5    entanercept (ENBREL) 50 mg/mL injection Inject 1 mL (50 mg total) into the skin once a week. 4 mL 11    esomeprazole magnesium (NEXIUM 24HR) 20 mg TbEC Take 20 mg by mouth once daily.      fluoxetine (PROZAC) 40 MG capsule Take 40 mg by mouth once daily.      folic acid (FOLVITE) 1 MG tablet TAKE 1 TABLET BY MOUTH DAILY 30 tablet 2    hydrOXYchloroQUINE (PLAQUENIL) 200 mg tablet TAKE 1  TABLET BY MOUTH TWICE DAILY 60 tablet 3    ibuprofen (ADVIL,MOTRIN) 800 MG tablet TAKE 1 TABLET BY MOUTH 2 OR 3 TIMES A DAY AS NEEDED 90 tablet 2    losartan (COZAAR) 50 MG tablet Take 50 mg by mouth once daily.      metformin (GLUCOPHAGE) 500 MG tablet Take 500 mg by mouth once daily.      methotrexate 2.5 MG Tab TAKE 10 TABLETS BY MOUTH EVERY 7 DAYS 40 tablet 3    ondansetron (ZOFRAN) 4 MG tablet 4 mg.      pravastatin (PRAVACHOL) 10 MG tablet Take 10 mg by mouth every evening.  5    predniSONE (DELTASONE) 5 MG tablet 20mg x 2 days, then 15mg x 2 days, then 10mg x 2 days then stop; may repeat x 1 40 tablet 2    traMADoL (ULTRAM) 50 mg tablet TAKE 1 OR 2 TABLETS BY MOUTH EVERY 8 HOURS AS NEEDED FOR PAIN 180 tablet 2    traZODone (DESYREL) 50 MG tablet TAKE 1 OR 2 TABLETS BY MOUTH EVERY EVENING 60 tablet 11    turmeric/turmeric ext/pepr ext (TURMERIC-TURMERIC EXT-PEPPER) 500-3 mg Cap Take 1 capsule by mouth every evening.      fluticasone propionate (FLONASE) 50 mcg/actuation nasal spray 1 spray (50 mcg total) by Each Nare route once daily. 1 Bottle 3     No current facility-administered medications on file prior to visit.        There were no vitals filed for this visit.    Physical Exam:    Physical Exam  Constitutional:       Appearance: She is well-developed.   HENT:      Head: Normocephalic and atraumatic.   Eyes:      General: Lids are normal.      Pupils: Pupils are equal, round, and reactive to light.   Neck:      Musculoskeletal: Normal range of motion.   Cardiovascular:      Rate and Rhythm: Normal rate and regular rhythm.      Heart sounds: Normal heart sounds.   Pulmonary:      Effort: Pulmonary effort is normal.      Breath sounds: Normal breath sounds.   Musculoskeletal:      Right shoulder: She exhibits decreased range of motion and tenderness. She exhibits no swelling.      Left shoulder: She exhibits normal range of motion, no tenderness and no swelling.      Right elbow: She exhibits  normal range of motion and no swelling. No tenderness found.      Left elbow: She exhibits normal range of motion and no swelling. No tenderness found.      Right wrist: She exhibits decreased range of motion, tenderness and swelling.      Left wrist: She exhibits normal range of motion, no tenderness and no swelling.      Right hip: She exhibits decreased range of motion and tenderness.      Left hip: She exhibits decreased range of motion and tenderness.      Right knee: She exhibits normal range of motion and no swelling. No tenderness found.      Left knee: She exhibits normal range of motion and no swelling. No tenderness found.      Right hand: She exhibits tenderness. She exhibits normal range of motion and no swelling.      Left hand: She exhibits tenderness. She exhibits normal range of motion and no swelling.      Right foot: Normal range of motion. No tenderness or swelling.      Left foot: Normal range of motion. No tenderness or swelling.      Comments: PIP synovitismild decreased range of motion with  MCPs 2 3 some tenderness no swelling.  Wrist right with swelling decreased extension left wrist full range of motion no active synovitis.   tenderness in the shoulder pain on resisted abduction  Left with full range of motion on painful.   Skin:     General: Skin is warm and dry.   Neurological:      Mental Status: She is alert and oriented to person, place, and time.   Psychiatric:         Behavior: Behavior normal.         Thought Content: Thought content normal.               Assessment:       Encounter Diagnoses   Name Primary?    Rheumatoid arthritis involving both hands with positive rheumatoid factor Yes    2019 novel coronavirus disease (COVID-19)     Immunosuppression           Plan:        Rheumatoid arthritis involving both hands with positive rheumatoid factor  -     folic acid (FOLVITE) 1 MG tablet; Take 1 tablet (1,000 mcg total) by mouth once daily.  Dispense: 30 tablet; Refill: 2  -      methotrexate 2.5 MG Tab; Take 10 tablets (25 mg total) by mouth every 7 days.  Dispense: 40 tablet; Refill: 3  -     traMADoL (ULTRAM) 50 mg tablet; TAKE 1 OR 2 TABLETS BY MOUTH EVERY 8 HOURS AS NEEDED FOR PAIN  Dispense: 180 tablet; Refill: 2  -     ibuprofen (ADVIL,MOTRIN) 800 MG tablet; TAKE 1 TABLET BY MOUTH 2 OR 3 TIMES A DAY AS NEEDED  Dispense: 90 tablet; Refill: 2 2019 novel coronavirus disease (COVID-19)    Immunosuppression         Patient is a 50-year-old female with a history of rheumatoid arthritis managed on methotrexate and hydroxychloroquine 200 mg once daily.  For now I want her to increase her hydroxychloroquine to 200 mg b.i.d. continue methotrexate at 17.5 mg weekly.  I want her to get established for pre biologic labs in the event that we may consider using a biologic in the next 6 months.    Ok for prednisone if flares as you have done in past.     Failed Humira with URI/HA  Started Enbrel Sureclick 50mg Qweekly ( 3/21/20 start date. ) still not at full efficacy. Await 6 more weeks if not improved re switch to new biologic.   MTX 10 tabs weekly   HCQ 200mg BID-UTD 2019    No URI s/sx with Enbrel.   Recent SARS-COV-2 infection 1 month ago. Still feeling fatigue, mild dyspnea. Back on all immunosuppression as joints starting to flare. Ok to resume NSAID and PRN prednisone 5mg if needed.   Joints moderately flared.          Patient is aware of the risks benefits side effects and monitoring requirements of biologic therapy including but not limited to disseminated tuberculosis recurrent serious infections suppression of the immune system, injection site reactions.  F/u 4 months.   No follow-ups on file.      30min consultation with greater than 50% spent in counseling, chart review and coordination of care. All questions answered.        The patient location is: Home [place]  The chief complaint leading to consultation is: medication management and f/u   Visit type: Virtual visit with synchronous  audio and video  Total time spent with patient: 30  Each patient to whom he or she provides medical services by telemedicine is:  (1) informed of the relationship between the physician and patient and the respective role of any other health care provider with respect to management of the patient; and (2) notified that he or she may decline to receive medical services by telemedicine and may withdraw from such care at any time.    Notes:

## 2020-11-03 PROBLEM — M06.9 RHEUMATOID ARTHRITIS: Status: ACTIVE | Noted: 2020-11-03

## 2020-11-03 NOTE — TELEPHONE ENCOUNTER
Specialty Pharmacy - Clinical Reassessment  Specialty Pharmacy - Refill Coordination    Specialty Medication Orders Linked to Encounter      Most Recent Value   Medication #1  entanercept (ENBREL) 50 mg/mL injection (Order#[id number], Rx#[id number])      Pt confirmed shipping of Enbrel on  to arrive on 11/10. Address and  verified. $80 copay in 004. Pt is in need of a new sharps container- added to shipment. Pt has 1 dose on hand, next dose due 20.Pt did not start any new medications.     Subjective    Annita Bettencourt is a 50 y.o. female, who is followed by the specialty pharmacy service for management and education.    Encounters since last clinical assessment   No encounters found.   Clinical call attempts since last clinical assessment   No call attempts found.     Today she received follow up education for her specialty medication(s).    Current Outpatient Medications   Medication Sig    amlodipine (NORVASC) 10 MG tablet Take 10 mg by mouth once daily.    atenolol (TENORMIN) 25 MG tablet Take 25 mg by mouth once daily.    entanercept (ENBREL) 50 mg/mL injection Inject 1 mL (50 mg total) into the skin once a week.    esomeprazole magnesium (NEXIUM 24HR) 20 mg TbEC Take 20 mg by mouth once daily.    fluoxetine (PROZAC) 40 MG capsule Take 40 mg by mouth once daily.    fluticasone propionate (FLONASE) 50 mcg/actuation nasal spray 1 spray (50 mcg total) by Each Nare route once daily.    folic acid (FOLVITE) 1 MG tablet Take 1 tablet (1,000 mcg total) by mouth once daily.    hydrOXYchloroQUINE (PLAQUENIL) 200 mg tablet TAKE 1 TABLET BY MOUTH TWICE DAILY    ibuprofen (ADVIL,MOTRIN) 800 MG tablet TAKE 1 TABLET BY MOUTH 2 OR 3 TIMES A DAY AS NEEDED    losartan (COZAAR) 50 MG tablet Take 50 mg by mouth once daily.    metformin (GLUCOPHAGE) 500 MG tablet Take 500 mg by mouth once daily.    methotrexate 2.5 MG Tab Take 10 tablets (25 mg total) by mouth every 7 days.    ondansetron (ZOFRAN) 4 MG  tablet 4 mg.    pravastatin (PRAVACHOL) 10 MG tablet Take 10 mg by mouth every evening.    predniSONE (DELTASONE) 5 MG tablet 20mg x 2 days, then 15mg x 2 days, then 10mg x 2 days then stop; may repeat x 1    traMADoL (ULTRAM) 50 mg tablet TAKE 1 OR 2 TABLETS BY MOUTH EVERY 8 HOURS AS NEEDED FOR PAIN    traZODone (DESYREL) 50 MG tablet TAKE 1 OR 2 TABLETS BY MOUTH EVERY EVENING    turmeric/turmeric ext/pepr ext (TURMERIC-TURMERIC EXT-PEPPER) 500-3 mg Cap Take 1 capsule by mouth every evening.   Last reviewed on 11/3/2020 11:51 AM by Anita Francis, Erna    Review of patient's allergies indicates:   Allergen Reactions    Sulfa (sulfonamide antibiotics) Rash   Last reviewed on  11/3/2020 11:51 AM by Anita Francis    Drug Interactions    Drug interactions evaluated: yes  Clinically relevant drug interactions identified: no  Provided the patient with educational material regarding drug interactions: not applicable       Medication Adherence    Patient reported X missed doses in the last month: 0  Any gaps in refill history greater than 2 weeks in the last 3 months: no  Demonstrates understanding of importance of adherence: no  Informant: patient  Reliability of informant: reliable  Provider-estimated medication adherence level: good  Reasons for non-adherence: no problems identified  Adherence tools used: calendar  Support network for adherence: family member  Confirmed plan for next specialty medication refill: delivery by pharmacy  Refills needed for supportive medications: not needed       Adverse Effects    *All other systems reviewed and are negative       Assessment Questions - Documented Responses      Most Recent Value   Assessment   Medication Reconciliation completed for patient  Yes   During the past 4 weeks, has patient missed any activities due to condition or medication?  No   During the past 4 weeks, did patient have any of the following urgent care visits?  None   Goals of Therapy Status  Achieving    Welcome packet contents reviewed and discussed with patient?  Yes   Assesment completed?  Yes   Plan  Therapy continued   Do you need to open a clinical intervention (i-vent)?  No   Do you want to schedule first shipment?  No        Refill Questions - Documented Responses      Most Recent Value   Relationship to patient of person spoken to?  Self   HIPAA/medical authority confirmed?  Yes   Any changes in contact preferences or allowed representatives?  No   Has the patient had any insurance changes?  No   Has the patient had any changes to specialty medication, dose, or instructions?  No   Has the patient started taking any new medications, herbals, or supplements?  No   Has the patient been diagnosed with any new medical conditions?  No   Does the patient have any new allergies to medications or foods?  No   Does the patient have any concerns about side effects?  No   Can the patient store medication/sharps container properly (at the correct temperature, away from children/pets, etc.)?  Yes   Can the patient call emergency services (911) in the event of an emergency?  Yes   Does the patient have any concerns or questions about taking or administering this medication as prescribed?  No   How many doses did the patient miss in the past 4 weeks or since the last fill?  0   How many doses does the patient have on hand?  1   How many days does the patient report on hand quantity will last?  4   Does the number of doses/days supply remaining match pharmacy expected amounts?  Yes   How will the patient receive the medication?  Mail   When does the patient need to receive the medication?  11/15/20   Shipping Address  Home   Address in [facility] confirmed and updated if neccessary?  Yes   Expected Copay ($)  80   Payment Method   CC on file   Days supply of Refill  28   Would patient like to speak to a pharmacist?  No   Do you want to trigger an intervention?  No   Do you want to trigger an additional  "referral task?  No   Refill activity completed?  Yes   Refill activity plan  Refill scheduled   Shipment/Pickup Date:  11/09/20          Objective    She has a past medical history of Acid reflux, Anxiety, Depression, Diabetes mellitus, Enlarged heart, Hypertension, Murmur, and Rheumatoid arthritis.    BP Readings from Last 4 Encounters:   02/18/20 117/66   10/16/19 126/77   05/13/19 122/77   12/11/18 108/70     Ht Readings from Last 4 Encounters:   10/29/20 5' 2" (1.575 m)   06/25/20 (P) 5' 2" (1.575 m)   02/18/20 5' 2" (1.575 m)   10/16/19 5' 2" (1.575 m)     Wt Readings from Last 4 Encounters:   10/29/20 105.4 kg (232 lb 5.8 oz)   06/25/20 (P) 105.4 kg (232 lb 7.6 oz)   02/18/20 104.5 kg (230 lb 4.3 oz)   10/16/19 113 kg (249 lb 1.9 oz)     Recent Labs   Lab Result Units 10/24/20  1104   Creatinine mg/dL 0.9   ALT U/L 19   AST U/L 13     The goals of rheumatoid arthritis treatment include:  · Relieving pain and suppressing inflammation  · Achieving remission and preventing joint and organ damage  · Increasing joint mobility and strength  · Preventing infection and other complications of treatment  · Reducing long term complications of rheumatoid arthritis  · Improving or maintaining physical function and optimal well-being  · Improving or maintaining quality of life  · Maintaining optimal therapy adherence  · Minimizing and managing side effects    Goals of Therapy Status: Achieving    Assessment/Plan  Patient plans to continue therapy without changes    Enbrel Indication, dosage, appropriateness, effectiveness, safety and convenience of her specialty medication(s) were reviewed today.     Patient Counseling    Counseled the patient on the following: doses and administration discussed, safe handling, storage, and disposal discussed, possible adverse effects and management discussed, possible drug and prescription drug interactions discussed, possible drug and OTC drug and food interactions discussed, lab " monitoring and follow-up discussed, therapeutic rationale discussed, cost of medications and cost implications discussed, adherence and missed doses discussed, pharmacy contact information discussed     Pt reports that Enbrel continues to help with RA. Current joint pain located in her hands avg rated 2/10 on average. Pt reports no SE or issues with enbrel and continues to tolerate well. Labs reviewed 10/24/20 stable. Med list reviewed- no major medication interactions. Pt had no further questions or concerns.       Tasks added this encounter   12/6/2020 - Refill Call (Auto Added)  1/25/2021 - Clinical - Follow Up Assesement (90 day)   Tasks due within next 3 months   No tasks due.     Anita Francis, Erna  [facility] - Specialty Pharm[facility]  [address] [address]  Phone: [phone]  Fax: [phone]

## 2020-12-10 ENCOUNTER — SPECIALTY PHARMACY (OUTPATIENT)
Dept: PHARMACY | Facility: CLINIC | Age: 50
End: 2020-12-10

## 2020-12-10 NOTE — TELEPHONE ENCOUNTER
Specialty Pharmacy - Refill Coordination    Specialty Medication Orders Linked to Encounter      Most Recent Value   Medication #1  entanercept (ENBREL) 50 mg/mL injection (Order#[id number], Rx#[id number])          Refill Questions - Documented Responses      Most Recent Value   Relationship to patient of person spoken to?  Self   HIPAA/medical authority confirmed?  Yes   Any changes in contact preferences or allowed representatives?  No   Has the patient had any insurance changes?  No   Has the patient had any changes to specialty medication, dose, or instructions?  No   Has the patient started taking any new medications, herbals, or supplements?  No   Has the patient been diagnosed with any new medical conditions?  No   Does the patient have any new allergies to medications or foods?  No   Does the patient have any concerns about side effects?  No   Can the patient store medication/sharps container properly (at the correct temperature, away from children/pets, etc.)?  Yes   Can the patient call emergency services (131) in the event of an emergency?  Yes   Does the patient have any concerns or questions about taking or administering this medication as prescribed?  No   How many doses did the patient miss in the past 4 weeks or since the last fill?  0   How many doses does the patient have on hand?  0   Does the number of doses/days supply remaining match pharmacy expected amounts?  Yes   Does the patient feel that this medication is effective?  Yes   During the past 4 weeks, has patient missed any activities due to condition or medication?  No   During the past 4 weeks, did patient have any of the following urgent care visits?  None   How will the patient receive the medication?  Mail   When does the patient need to receive the medication?  12/13/20   Shipping Address  Home   Address in [facility] confirmed and updated if neccessary?  Yes   Expected Copay ($)  80   Is the patient able to afford the  medication copay?  Yes   Payment Method  CC on file   Days supply of Refill  28   Would patient like to speak to a pharmacist?  No   Do you want to trigger an intervention?  No   Do you want to trigger an additional referral task?  No   Refill activity completed?  Yes   Refill activity plan  Refill scheduled   Shipment/Pickup Date:  12/10/20          Current Outpatient Medications   Medication Sig    amlodipine (NORVASC) 10 MG tablet Take 10 mg by mouth once daily.    atenolol (TENORMIN) 25 MG tablet Take 25 mg by mouth once daily.    entanercept (ENBREL) 50 mg/mL injection Inject 1 mL (50 mg total) into the skin once a week.    esomeprazole magnesium (NEXIUM 24HR) 20 mg TbEC Take 20 mg by mouth once daily.    fluoxetine (PROZAC) 40 MG capsule Take 40 mg by mouth once daily.    fluticasone propionate (FLONASE) 50 mcg/actuation nasal spray USE 1 SPRAY EVERY DAY IN EACH NOSTRIL    folic acid (FOLVITE) 1 MG tablet Take 1 tablet (1,000 mcg total) by mouth once daily.    hydrOXYchloroQUINE (PLAQUENIL) 200 mg tablet TAKE 1 TABLET BY MOUTH TWICE DAILY    ibuprofen (ADVIL,MOTRIN) 800 MG tablet TAKE 1 TABLET BY MOUTH 2 OR 3 TIMES A DAY AS NEEDED    losartan (COZAAR) 50 MG tablet Take 50 mg by mouth once daily.    metformin (GLUCOPHAGE) 500 MG tablet Take 500 mg by mouth once daily.    methotrexate 2.5 MG Tab Take 10 tablets (25 mg total) by mouth every 7 days.    ondansetron (ZOFRAN) 4 MG tablet 4 mg.    pravastatin (PRAVACHOL) 10 MG tablet Take 10 mg by mouth every evening.    predniSONE (DELTASONE) 5 MG tablet 20mg x 2 days, then 15mg x 2 days, then 10mg x 2 days then stop; may repeat x 1    traMADoL (ULTRAM) 50 mg tablet TAKE 1 OR 2 TABLETS BY MOUTH EVERY 8 HOURS AS NEEDED FOR PAIN    traZODone (DESYREL) 50 MG tablet TAKE 1 OR 2 TABLETS BY MOUTH EVERY EVENING    turmeric/turmeric ext/pepr ext (TURMERIC-TURMERIC EXT-PEPPER) 500-3 mg Cap Take 1 capsule by mouth every evening.   Last reviewed on 12/10/2020   4:43 PM by Gosia Zhao    Review of patient's allergies indicates:   Allergen Reactions    Sulfa (sulfonamide antibiotics) Rash    Last reviewed on  12/10/2020 4:42 PM by Gosia Zhao      Tasks added this encounter   No tasks added.   Tasks due within next 3 months   12/6/2020 - Refill Call (Auto Added)  1/25/2021 - Clinical - Follow Up Assesement (90 day)     Gosia Zhao  [facility] - Specialty Pharmacy  [address] [place] [address]  Phone: [phone]  Fax: [phone]

## 2021-01-05 ENCOUNTER — PATIENT MESSAGE (OUTPATIENT)
Dept: PHARMACY | Facility: CLINIC | Age: 51
End: 2021-01-05

## 2021-01-08 ENCOUNTER — SPECIALTY PHARMACY (OUTPATIENT)
Dept: PHARMACY | Facility: CLINIC | Age: 51
End: 2021-01-08

## 2021-01-08 DIAGNOSIS — M06.9 RHEUMATOID ARTHRITIS, INVOLVING UNSPECIFIED SITE, UNSPECIFIED WHETHER RHEUMATOID FACTOR PRESENT: Primary | ICD-10-CM

## 2021-01-30 ENCOUNTER — SPECIALTY PHARMACY (OUTPATIENT)
Dept: PHARMACY | Facility: CLINIC | Age: 51
End: 2021-01-30

## 2021-02-02 ENCOUNTER — PATIENT MESSAGE (OUTPATIENT)
Dept: RHEUMATOLOGY | Facility: CLINIC | Age: 51
End: 2021-02-02

## 2021-02-19 ENCOUNTER — PATIENT MESSAGE (OUTPATIENT)
Dept: RHEUMATOLOGY | Facility: CLINIC | Age: 51
End: 2021-02-19

## 2021-03-03 DIAGNOSIS — M05.742 RHEUMATOID ARTHRITIS INVOLVING BOTH HANDS WITH POSITIVE RHEUMATOID FACTOR: ICD-10-CM

## 2021-03-03 DIAGNOSIS — M05.741 RHEUMATOID ARTHRITIS INVOLVING BOTH HANDS WITH POSITIVE RHEUMATOID FACTOR: ICD-10-CM

## 2021-03-03 RX ORDER — ETANERCEPT 50 MG/ML
50 SOLUTION SUBCUTANEOUS WEEKLY
Qty: 4 ML | Refills: 11 | Status: CANCELLED | OUTPATIENT
Start: 2021-03-03 | End: 2022-03-03

## 2021-03-05 DIAGNOSIS — M05.741 RHEUMATOID ARTHRITIS INVOLVING BOTH HANDS WITH POSITIVE RHEUMATOID FACTOR: ICD-10-CM

## 2021-03-05 DIAGNOSIS — M05.742 RHEUMATOID ARTHRITIS INVOLVING BOTH HANDS WITH POSITIVE RHEUMATOID FACTOR: ICD-10-CM

## 2021-03-08 RX ORDER — ETANERCEPT 50 MG/ML
50 SOLUTION SUBCUTANEOUS WEEKLY
Qty: 4 ML | Refills: 11 | Status: SHIPPED | OUTPATIENT
Start: 2021-03-08 | End: 2022-01-31

## 2021-03-09 ENCOUNTER — SPECIALTY PHARMACY (OUTPATIENT)
Dept: PHARMACY | Facility: CLINIC | Age: 51
End: 2021-03-09

## 2021-04-06 ENCOUNTER — SPECIALTY PHARMACY (OUTPATIENT)
Dept: PHARMACY | Facility: CLINIC | Age: 51
End: 2021-04-06

## 2021-04-27 ENCOUNTER — PATIENT MESSAGE (OUTPATIENT)
Dept: RHEUMATOLOGY | Facility: CLINIC | Age: 51
End: 2021-04-27

## 2021-04-29 ENCOUNTER — SPECIALTY PHARMACY (OUTPATIENT)
Dept: PHARMACY | Facility: CLINIC | Age: 51
End: 2021-04-29

## 2021-04-29 ENCOUNTER — OFFICE VISIT (OUTPATIENT)
Dept: RHEUMATOLOGY | Facility: CLINIC | Age: 51
End: 2021-04-29
Payer: COMMERCIAL

## 2021-04-29 ENCOUNTER — PATIENT MESSAGE (OUTPATIENT)
Dept: RESEARCH | Facility: HOSPITAL | Age: 51
End: 2021-04-29

## 2021-04-29 VITALS — BODY MASS INDEX: 42.76 KG/M2 | HEIGHT: 62 IN | WEIGHT: 232.38 LBS

## 2021-04-29 DIAGNOSIS — M05.741 RHEUMATOID ARTHRITIS INVOLVING BOTH HANDS WITH POSITIVE RHEUMATOID FACTOR: Primary | ICD-10-CM

## 2021-04-29 DIAGNOSIS — M05.742 RHEUMATOID ARTHRITIS INVOLVING BOTH HANDS WITH POSITIVE RHEUMATOID FACTOR: Primary | ICD-10-CM

## 2021-04-29 DIAGNOSIS — D84.9 IMMUNOSUPPRESSION: ICD-10-CM

## 2021-04-29 DIAGNOSIS — G47.00 INSOMNIA, UNSPECIFIED TYPE: ICD-10-CM

## 2021-04-29 PROCEDURE — 99214 OFFICE O/P EST MOD 30 MIN: CPT | Mod: 95,,, | Performed by: INTERNAL MEDICINE

## 2021-04-29 PROCEDURE — 1126F AMNT PAIN NOTED NONE PRSNT: CPT | Mod: ,,, | Performed by: INTERNAL MEDICINE

## 2021-04-29 PROCEDURE — 3008F PR BODY MASS INDEX (BMI) DOCUMENTED: ICD-10-PCS | Mod: CPTII,,, | Performed by: INTERNAL MEDICINE

## 2021-04-29 PROCEDURE — 3008F BODY MASS INDEX DOCD: CPT | Mod: CPTII,,, | Performed by: INTERNAL MEDICINE

## 2021-04-29 PROCEDURE — 99214 PR OFFICE/OUTPT VISIT, EST, LEVL IV, 30-39 MIN: ICD-10-PCS | Mod: 95,,, | Performed by: INTERNAL MEDICINE

## 2021-04-29 PROCEDURE — 1126F PR PAIN SEVERITY QUANTIFIED, NO PAIN PRESENT: ICD-10-PCS | Mod: ,,, | Performed by: INTERNAL MEDICINE

## 2021-04-29 RX ORDER — METHOTREXATE 2.5 MG/1
TABLET ORAL
Qty: 40 TABLET | Refills: 3 | Status: SHIPPED | OUTPATIENT
Start: 2021-04-29 | End: 2021-12-24

## 2021-04-29 RX ORDER — HYDROXYCHLOROQUINE SULFATE 200 MG/1
200 TABLET, FILM COATED ORAL 2 TIMES DAILY
Qty: 60 TABLET | Refills: 3 | Status: SHIPPED | OUTPATIENT
Start: 2021-04-29 | End: 2021-10-04

## 2021-04-29 RX ORDER — FOLIC ACID 1 MG/1
1000 TABLET ORAL DAILY
Qty: 30 TABLET | Refills: 2 | Status: SHIPPED | OUTPATIENT
Start: 2021-04-29 | End: 2021-10-25

## 2021-04-29 RX ORDER — IBUPROFEN 800 MG/1
TABLET ORAL
Qty: 90 TABLET | Refills: 2 | Status: SHIPPED | OUTPATIENT
Start: 2021-04-29 | End: 2022-12-28

## 2021-04-29 RX ORDER — TRAZODONE HYDROCHLORIDE 50 MG/1
TABLET ORAL
Qty: 60 TABLET | Refills: 11 | Status: SHIPPED | OUTPATIENT
Start: 2021-04-29 | End: 2022-06-01

## 2021-05-01 ENCOUNTER — LAB VISIT (OUTPATIENT)
Dept: LAB | Facility: HOSPITAL | Age: 51
End: 2021-05-01
Attending: INTERNAL MEDICINE
Payer: COMMERCIAL

## 2021-05-01 DIAGNOSIS — M05.742 RHEUMATOID ARTHRITIS INVOLVING BOTH HANDS WITH POSITIVE RHEUMATOID FACTOR: ICD-10-CM

## 2021-05-01 DIAGNOSIS — M05.741 RHEUMATOID ARTHRITIS INVOLVING BOTH HANDS WITH POSITIVE RHEUMATOID FACTOR: ICD-10-CM

## 2021-05-01 DIAGNOSIS — D84.9 IMMUNOSUPPRESSION: ICD-10-CM

## 2021-05-01 LAB
ALBUMIN SERPL BCP-MCNC: 3.8 G/DL (ref 3.5–5.2)
ALP SERPL-CCNC: 69 U/L (ref 55–135)
ALT SERPL W/O P-5'-P-CCNC: 24 U/L (ref 10–44)
ANION GAP SERPL CALC-SCNC: 9 MMOL/L (ref 8–16)
AST SERPL-CCNC: 19 U/L (ref 10–40)
BASOPHILS # BLD AUTO: 0.08 K/UL (ref 0–0.2)
BASOPHILS NFR BLD: 1.2 % (ref 0–1.9)
BILIRUB SERPL-MCNC: 0.4 MG/DL (ref 0.1–1)
BUN SERPL-MCNC: 15 MG/DL (ref 6–20)
CALCIUM SERPL-MCNC: 10 MG/DL (ref 8.7–10.5)
CHLORIDE SERPL-SCNC: 105 MMOL/L (ref 95–110)
CO2 SERPL-SCNC: 26 MMOL/L (ref 23–29)
CREAT SERPL-MCNC: 0.9 MG/DL (ref 0.5–1.4)
CRP SERPL-MCNC: 4.8 MG/L (ref 0–8.2)
DIFFERENTIAL METHOD: ABNORMAL
EOSINOPHIL # BLD AUTO: 0.3 K/UL (ref 0–0.5)
EOSINOPHIL NFR BLD: 4.4 % (ref 0–8)
ERYTHROCYTE [DISTWIDTH] IN BLOOD BY AUTOMATED COUNT: 16.9 % (ref 11.5–14.5)
ERYTHROCYTE [SEDIMENTATION RATE] IN BLOOD BY WESTERGREN METHOD: 8 MM/HR (ref 0–36)
EST. GFR  (AFRICAN AMERICAN): >60 ML/MIN/1.73 M^2
EST. GFR  (NON AFRICAN AMERICAN): >60 ML/MIN/1.73 M^2
GLUCOSE SERPL-MCNC: 103 MG/DL (ref 70–110)
HCT VFR BLD AUTO: 40.5 % (ref 37–48.5)
HGB BLD-MCNC: 12.4 G/DL (ref 12–16)
IMM GRANULOCYTES # BLD AUTO: 0.05 K/UL (ref 0–0.04)
IMM GRANULOCYTES NFR BLD AUTO: 0.7 % (ref 0–0.5)
LYMPHOCYTES # BLD AUTO: 1.5 K/UL (ref 1–4.8)
LYMPHOCYTES NFR BLD: 21.6 % (ref 18–48)
MCH RBC QN AUTO: 26.2 PG (ref 27–31)
MCHC RBC AUTO-ENTMCNC: 30.6 G/DL (ref 32–36)
MCV RBC AUTO: 86 FL (ref 82–98)
MONOCYTES # BLD AUTO: 0.6 K/UL (ref 0.3–1)
MONOCYTES NFR BLD: 8.1 % (ref 4–15)
NEUTROPHILS # BLD AUTO: 4.3 K/UL (ref 1.8–7.7)
NEUTROPHILS NFR BLD: 64 % (ref 38–73)
NRBC BLD-RTO: 0 /100 WBC
PLATELET # BLD AUTO: 277 K/UL (ref 150–450)
PMV BLD AUTO: 11.5 FL (ref 9.2–12.9)
POTASSIUM SERPL-SCNC: 4.7 MMOL/L (ref 3.5–5.1)
PROT SERPL-MCNC: 7.2 G/DL (ref 6–8.4)
RBC # BLD AUTO: 4.73 M/UL (ref 4–5.4)
SODIUM SERPL-SCNC: 140 MMOL/L (ref 136–145)
WBC # BLD AUTO: 6.76 K/UL (ref 3.9–12.7)

## 2021-05-01 PROCEDURE — 80053 COMPREHEN METABOLIC PANEL: CPT | Performed by: INTERNAL MEDICINE

## 2021-05-01 PROCEDURE — 86140 C-REACTIVE PROTEIN: CPT | Performed by: INTERNAL MEDICINE

## 2021-05-01 PROCEDURE — 85025 COMPLETE CBC W/AUTO DIFF WBC: CPT | Performed by: INTERNAL MEDICINE

## 2021-05-01 PROCEDURE — 85652 RBC SED RATE AUTOMATED: CPT | Performed by: INTERNAL MEDICINE

## 2021-05-01 PROCEDURE — 36415 COLL VENOUS BLD VENIPUNCTURE: CPT | Mod: PO | Performed by: INTERNAL MEDICINE

## 2021-05-28 ENCOUNTER — SPECIALTY PHARMACY (OUTPATIENT)
Dept: PHARMACY | Facility: CLINIC | Age: 51
End: 2021-05-28

## 2021-06-25 ENCOUNTER — PATIENT MESSAGE (OUTPATIENT)
Dept: PHARMACY | Facility: CLINIC | Age: 51
End: 2021-06-25

## 2021-06-30 ENCOUNTER — SPECIALTY PHARMACY (OUTPATIENT)
Dept: PHARMACY | Facility: CLINIC | Age: 51
End: 2021-06-30

## 2021-07-23 ENCOUNTER — SPECIALTY PHARMACY (OUTPATIENT)
Dept: PHARMACY | Facility: CLINIC | Age: 51
End: 2021-07-23

## 2021-07-31 ENCOUNTER — LAB VISIT (OUTPATIENT)
Dept: LAB | Facility: HOSPITAL | Age: 51
End: 2021-07-31
Attending: INTERNAL MEDICINE
Payer: COMMERCIAL

## 2021-07-31 DIAGNOSIS — D84.9 IMMUNOSUPPRESSION: ICD-10-CM

## 2021-07-31 DIAGNOSIS — M05.742 RHEUMATOID ARTHRITIS INVOLVING BOTH HANDS WITH POSITIVE RHEUMATOID FACTOR: ICD-10-CM

## 2021-07-31 DIAGNOSIS — M05.741 RHEUMATOID ARTHRITIS INVOLVING BOTH HANDS WITH POSITIVE RHEUMATOID FACTOR: ICD-10-CM

## 2021-07-31 LAB
ALBUMIN SERPL BCP-MCNC: 3.8 G/DL (ref 3.5–5.2)
ALP SERPL-CCNC: 64 U/L (ref 55–135)
ALT SERPL W/O P-5'-P-CCNC: 23 U/L (ref 10–44)
ANION GAP SERPL CALC-SCNC: 7 MMOL/L (ref 8–16)
AST SERPL-CCNC: 17 U/L (ref 10–40)
BASOPHILS # BLD AUTO: 0.07 K/UL (ref 0–0.2)
BASOPHILS NFR BLD: 1 % (ref 0–1.9)
BILIRUB SERPL-MCNC: 0.4 MG/DL (ref 0.1–1)
BUN SERPL-MCNC: 14 MG/DL (ref 6–20)
CALCIUM SERPL-MCNC: 10.5 MG/DL (ref 8.7–10.5)
CHLORIDE SERPL-SCNC: 106 MMOL/L (ref 95–110)
CO2 SERPL-SCNC: 26 MMOL/L (ref 23–29)
CREAT SERPL-MCNC: 0.8 MG/DL (ref 0.5–1.4)
CRP SERPL-MCNC: 4 MG/L (ref 0–8.2)
DIFFERENTIAL METHOD: ABNORMAL
EOSINOPHIL # BLD AUTO: 0.3 K/UL (ref 0–0.5)
EOSINOPHIL NFR BLD: 3.9 % (ref 0–8)
ERYTHROCYTE [DISTWIDTH] IN BLOOD BY AUTOMATED COUNT: 17 % (ref 11.5–14.5)
ERYTHROCYTE [SEDIMENTATION RATE] IN BLOOD BY WESTERGREN METHOD: 15 MM/HR (ref 0–36)
EST. GFR  (AFRICAN AMERICAN): >60 ML/MIN/1.73 M^2
EST. GFR  (NON AFRICAN AMERICAN): >60 ML/MIN/1.73 M^2
GLUCOSE SERPL-MCNC: 98 MG/DL (ref 70–110)
HCT VFR BLD AUTO: 40.8 % (ref 37–48.5)
HGB BLD-MCNC: 12.4 G/DL (ref 12–16)
IMM GRANULOCYTES # BLD AUTO: 0.05 K/UL (ref 0–0.04)
IMM GRANULOCYTES NFR BLD AUTO: 0.7 % (ref 0–0.5)
LYMPHOCYTES # BLD AUTO: 1.9 K/UL (ref 1–4.8)
LYMPHOCYTES NFR BLD: 26.3 % (ref 18–48)
MCH RBC QN AUTO: 25.9 PG (ref 27–31)
MCHC RBC AUTO-ENTMCNC: 30.4 G/DL (ref 32–36)
MCV RBC AUTO: 85 FL (ref 82–98)
MONOCYTES # BLD AUTO: 0.6 K/UL (ref 0.3–1)
MONOCYTES NFR BLD: 8.5 % (ref 4–15)
NEUTROPHILS # BLD AUTO: 4.3 K/UL (ref 1.8–7.7)
NEUTROPHILS NFR BLD: 59.6 % (ref 38–73)
NRBC BLD-RTO: 0 /100 WBC
PLATELET # BLD AUTO: 297 K/UL (ref 150–450)
PMV BLD AUTO: 11.6 FL (ref 9.2–12.9)
POTASSIUM SERPL-SCNC: 4.5 MMOL/L (ref 3.5–5.1)
PROT SERPL-MCNC: 7.1 G/DL (ref 6–8.4)
RBC # BLD AUTO: 4.79 M/UL (ref 4–5.4)
SODIUM SERPL-SCNC: 139 MMOL/L (ref 136–145)
WBC # BLD AUTO: 7.26 K/UL (ref 3.9–12.7)

## 2021-07-31 PROCEDURE — 85652 RBC SED RATE AUTOMATED: CPT | Performed by: INTERNAL MEDICINE

## 2021-07-31 PROCEDURE — 85025 COMPLETE CBC W/AUTO DIFF WBC: CPT | Performed by: INTERNAL MEDICINE

## 2021-07-31 PROCEDURE — 80053 COMPREHEN METABOLIC PANEL: CPT | Performed by: INTERNAL MEDICINE

## 2021-07-31 PROCEDURE — 86140 C-REACTIVE PROTEIN: CPT | Performed by: INTERNAL MEDICINE

## 2021-07-31 PROCEDURE — 36415 COLL VENOUS BLD VENIPUNCTURE: CPT | Mod: PO | Performed by: INTERNAL MEDICINE

## 2021-08-21 ENCOUNTER — SPECIALTY PHARMACY (OUTPATIENT)
Dept: PHARMACY | Facility: CLINIC | Age: 51
End: 2021-08-21

## 2021-09-22 ENCOUNTER — SPECIALTY PHARMACY (OUTPATIENT)
Dept: PHARMACY | Facility: CLINIC | Age: 51
End: 2021-09-22

## 2021-09-22 ENCOUNTER — OFFICE VISIT (OUTPATIENT)
Dept: RHEUMATOLOGY | Facility: CLINIC | Age: 51
End: 2021-09-22
Payer: COMMERCIAL

## 2021-09-22 VITALS
BODY MASS INDEX: 45.24 KG/M2 | HEART RATE: 81 BPM | SYSTOLIC BLOOD PRESSURE: 135 MMHG | HEIGHT: 62 IN | WEIGHT: 245.81 LBS | DIASTOLIC BLOOD PRESSURE: 81 MMHG

## 2021-09-22 DIAGNOSIS — M05.741 RHEUMATOID ARTHRITIS INVOLVING BOTH HANDS WITH POSITIVE RHEUMATOID FACTOR: Primary | ICD-10-CM

## 2021-09-22 DIAGNOSIS — M05.742 RHEUMATOID ARTHRITIS INVOLVING BOTH HANDS WITH POSITIVE RHEUMATOID FACTOR: Primary | ICD-10-CM

## 2021-09-22 DIAGNOSIS — D84.9 IMMUNOSUPPRESSION: ICD-10-CM

## 2021-09-22 PROCEDURE — 99214 OFFICE O/P EST MOD 30 MIN: CPT | Mod: S$GLB,,, | Performed by: INTERNAL MEDICINE

## 2021-09-22 PROCEDURE — 99214 PR OFFICE/OUTPT VISIT, EST, LEVL IV, 30-39 MIN: ICD-10-PCS | Mod: S$GLB,,, | Performed by: INTERNAL MEDICINE

## 2021-09-22 PROCEDURE — 4010F ACE/ARB THERAPY RXD/TAKEN: CPT | Mod: CPTII,S$GLB,, | Performed by: INTERNAL MEDICINE

## 2021-09-22 PROCEDURE — 1159F PR MEDICATION LIST DOCUMENTED IN MEDICAL RECORD: ICD-10-PCS | Mod: CPTII,S$GLB,, | Performed by: INTERNAL MEDICINE

## 2021-09-22 PROCEDURE — 1160F RVW MEDS BY RX/DR IN RCRD: CPT | Mod: CPTII,S$GLB,, | Performed by: INTERNAL MEDICINE

## 2021-09-22 PROCEDURE — 1159F MED LIST DOCD IN RCRD: CPT | Mod: CPTII,S$GLB,, | Performed by: INTERNAL MEDICINE

## 2021-09-22 PROCEDURE — 3008F PR BODY MASS INDEX (BMI) DOCUMENTED: ICD-10-PCS | Mod: CPTII,S$GLB,, | Performed by: INTERNAL MEDICINE

## 2021-09-22 PROCEDURE — 3075F SYST BP GE 130 - 139MM HG: CPT | Mod: CPTII,S$GLB,, | Performed by: INTERNAL MEDICINE

## 2021-09-22 PROCEDURE — 4010F PR ACE/ARB THEARPY RXD/TAKEN: ICD-10-PCS | Mod: CPTII,S$GLB,, | Performed by: INTERNAL MEDICINE

## 2021-09-22 PROCEDURE — 3075F PR MOST RECENT SYSTOLIC BLOOD PRESS GE 130-139MM HG: ICD-10-PCS | Mod: CPTII,S$GLB,, | Performed by: INTERNAL MEDICINE

## 2021-09-22 PROCEDURE — 3079F PR MOST RECENT DIASTOLIC BLOOD PRESSURE 80-89 MM HG: ICD-10-PCS | Mod: CPTII,S$GLB,, | Performed by: INTERNAL MEDICINE

## 2021-09-22 PROCEDURE — 99999 PR PBB SHADOW E&M-EST. PATIENT-LVL III: ICD-10-PCS | Mod: PBBFAC,,, | Performed by: INTERNAL MEDICINE

## 2021-09-22 PROCEDURE — 99999 PR PBB SHADOW E&M-EST. PATIENT-LVL III: CPT | Mod: PBBFAC,,, | Performed by: INTERNAL MEDICINE

## 2021-09-22 PROCEDURE — 3079F DIAST BP 80-89 MM HG: CPT | Mod: CPTII,S$GLB,, | Performed by: INTERNAL MEDICINE

## 2021-09-22 PROCEDURE — 1160F PR REVIEW ALL MEDS BY PRESCRIBER/CLIN PHARMACIST DOCUMENTED: ICD-10-PCS | Mod: CPTII,S$GLB,, | Performed by: INTERNAL MEDICINE

## 2021-09-22 PROCEDURE — 3008F BODY MASS INDEX DOCD: CPT | Mod: CPTII,S$GLB,, | Performed by: INTERNAL MEDICINE

## 2021-09-22 RX ORDER — PREDNISOLONE ACETATE 10 MG/ML
SUSPENSION/ DROPS OPHTHALMIC
COMMUNITY
Start: 2021-08-09 | End: 2023-02-13

## 2021-09-22 ASSESSMENT — ROUTINE ASSESSMENT OF PATIENT INDEX DATA (RAPID3)
PAIN SCORE: 3
PSYCHOLOGICAL DISTRESS SCORE: 1.1
TOTAL RAPID3 SCORE: 3.44
PATIENT GLOBAL ASSESSMENT SCORE: 6
MDHAQ FUNCTION SCORE: 0.4
FATIGUE SCORE: 3.3

## 2021-09-25 ENCOUNTER — SPECIALTY PHARMACY (OUTPATIENT)
Dept: PHARMACY | Facility: CLINIC | Age: 51
End: 2021-09-25

## 2021-09-25 DIAGNOSIS — M06.9 RHEUMATOID ARTHRITIS, INVOLVING UNSPECIFIED SITE, UNSPECIFIED WHETHER RHEUMATOID FACTOR PRESENT: Primary | ICD-10-CM

## 2021-10-20 ENCOUNTER — SPECIALTY PHARMACY (OUTPATIENT)
Dept: PHARMACY | Facility: CLINIC | Age: 51
End: 2021-10-20

## 2021-11-22 ENCOUNTER — SPECIALTY PHARMACY (OUTPATIENT)
Dept: PHARMACY | Facility: CLINIC | Age: 51
End: 2021-11-22
Payer: COMMERCIAL

## 2021-12-20 ENCOUNTER — SPECIALTY PHARMACY (OUTPATIENT)
Dept: PHARMACY | Facility: CLINIC | Age: 51
End: 2021-12-20
Payer: COMMERCIAL

## 2022-01-20 ENCOUNTER — SPECIALTY PHARMACY (OUTPATIENT)
Dept: PHARMACY | Facility: CLINIC | Age: 52
End: 2022-01-20
Payer: COMMERCIAL

## 2022-01-24 NOTE — TELEPHONE ENCOUNTER
Enbrel for RA continuation of therapy request submitted to pts new plan. Cmm Key: [id number] and approved.   CaseId:[id number];Status:Approved; Date:01/01/2022;Coverage End Date:01/24/2023.     Enbrel Order set queued for benefits investigation completion. $80 copay

## 2022-01-27 NOTE — TELEPHONE ENCOUNTER
Specialty Pharmacy - Medication/Referral Authorization  Specialty Pharmacy - Refill Coordination    Specialty Medication Orders Linked to Encounter    Flowsheet Row Most Recent Value   Medication #1 etanercept (ENBREL SURECLICK) 50 mg/mL (1 mL) (Order#[id number], Rx#[id number])          Refill Questions - Documented Responses    Flowsheet Row Most Recent Value   Patient Availability and HIPAA Verification    Does patient want to proceed with activity? Yes   HIPAA/medical authority confirmed? Yes   Relationship to patient of person spoken to? Self   Refill Screening Questions    Changes to allergies? No   Changes to medications? No   New conditions since last clinic visit? No   Unplanned office visit, urgent care, ED, or hospital admission in the last 4 weeks? No   How does patient/caregiver feel medication is working? Good   Financial problems or insurance changes? No   How many doses of your specialty medications were missed in the last 4 weeks? 0   Would patient like to speak to a pharmacist? No   When does the patient need to receive the medication? 01/30/22   Refill Delivery Questions    How will the patient receive the medication? Mail   When does the patient need to receive the medication? 01/30/22   Shipping Address Home   Address in [facility] confirmed and updated if neccessary? Yes   Expected Copay ($) 80   Is the patient able to afford the medication copay? Yes   Payment Method  CC on file   Days supply of Refill 28   Supplies needed? No supplies needed   Refill activity completed? Yes   Refill activity plan Refill scheduled   Shipment/Pickup Date: 01/31/22          Current Outpatient Medications   Medication Sig    amlodipine (NORVASC) 10 MG tablet Take 10 mg by mouth once daily.    atenolol (TENORMIN) 25 MG tablet Take 25 mg by mouth once daily.    esomeprazole magnesium (NEXIUM 24HR) 20 mg TbEC Take 20 mg by mouth once daily.    etanercept (ENBREL SURECLICK) 50 mg/mL (1 mL)  Inject 1 mL (50 mg total) into the skin once a week.    fluoxetine (PROZAC) 40 MG capsule Take 40 mg by mouth once daily.    fluticasone propionate (FLONASE) 50 mcg/actuation nasal spray USE 1 SPRAY EVERY DAY IN EACH NOSTRIL    folic acid (FOLVITE) 1 MG tablet TAKE 1 TABLET BY MOUTH DAILY    hydrOXYchloroQUINE (PLAQUENIL) 200 mg tablet TAKE 1 TABLET BY MOUTH TWICE DAILY    ibuprofen (ADVIL,MOTRIN) 800 MG tablet TAKE 1 TABLET BY MOUTH 2 OR 3 TIMES A DAY AS NEEDED (Patient not taking: Reported on 9/22/2021)    losartan (COZAAR) 50 MG tablet Take 50 mg by mouth once daily.    metformin (GLUCOPHAGE) 500 MG tablet Take 500 mg by mouth once daily.    methotrexate 2.5 MG Tab TAKE 10 TABLETS BY MOUTH EVERY 7 DAYS    ondansetron (ZOFRAN) 4 MG tablet 4 mg.    pravastatin (PRAVACHOL) 10 MG tablet Take 10 mg by mouth every evening.    prednisoLONE acetate (PRED FORTE) 1 % DrpS Place into both eyes.    predniSONE (DELTASONE) 5 MG tablet 20mg x 2 days, then 15mg x 2 days, then 10mg x 2 days then stop; may repeat x 1 (Patient not taking: Reported on 4/29/2021)    traMADoL (ULTRAM) 50 mg tablet TAKE 1 OR 2 TABLETS BY MOUTH EVERY 8 HOURS AS NEEDED FOR PAIN    traZODone (DESYREL) 50 MG tablet TAKE 1 OR 2 TABLETS BY MOUTH EVERY EVENING    turmeric/turmeric ext/pepr ext (TURMERIC-TURMERIC EXT-PEPPER) 500-3 mg Cap Take 1 capsule by mouth every evening.   Last reviewed on 9/22/2021  4:31 PM by Agnieszka Schwab, DO    Review of patient's allergies indicates:   Allergen Reactions    Sulfa (sulfonamide antibiotics) Rash    Last reviewed on  9/22/2021 4:31 PM by Agnieszka Schwab      Tasks added this encounter   1/20/2022 - Referral Authorization  1/20/2022 - Prior Authorization   Tasks due within next 3 months   1/20/2022 - Refill Call (Auto Added)     Kodi Sanchez [facility] - [facility]ty Pharm[facility]  [address] [place] [address]  Phone: [phone]  Fax: [phone]

## 2022-01-31 ENCOUNTER — PATIENT MESSAGE (OUTPATIENT)
Dept: RHEUMATOLOGY | Facility: CLINIC | Age: 52
End: 2022-01-31

## 2022-01-31 ENCOUNTER — OFFICE VISIT (OUTPATIENT)
Dept: RHEUMATOLOGY | Facility: CLINIC | Age: 52
End: 2022-01-31
Payer: COMMERCIAL

## 2022-01-31 VITALS
BODY MASS INDEX: 44.38 KG/M2 | HEART RATE: 75 BPM | WEIGHT: 241.19 LBS | HEIGHT: 62 IN | SYSTOLIC BLOOD PRESSURE: 124 MMHG | DIASTOLIC BLOOD PRESSURE: 83 MMHG

## 2022-01-31 DIAGNOSIS — R53.83 MALAISE AND FATIGUE: ICD-10-CM

## 2022-01-31 DIAGNOSIS — M05.741 RHEUMATOID ARTHRITIS INVOLVING BOTH HANDS WITH POSITIVE RHEUMATOID FACTOR: Primary | ICD-10-CM

## 2022-01-31 DIAGNOSIS — D84.9 IMMUNOSUPPRESSION: ICD-10-CM

## 2022-01-31 DIAGNOSIS — M05.742 RHEUMATOID ARTHRITIS INVOLVING BOTH HANDS WITH POSITIVE RHEUMATOID FACTOR: Primary | ICD-10-CM

## 2022-01-31 DIAGNOSIS — R53.81 MALAISE AND FATIGUE: ICD-10-CM

## 2022-01-31 PROCEDURE — 99999 PR PBB SHADOW E&M-EST. PATIENT-LVL IV: ICD-10-PCS | Mod: PBBFAC,,, | Performed by: INTERNAL MEDICINE

## 2022-01-31 PROCEDURE — 99999 PR PBB SHADOW E&M-EST. PATIENT-LVL IV: CPT | Mod: PBBFAC,,, | Performed by: INTERNAL MEDICINE

## 2022-01-31 PROCEDURE — 3008F BODY MASS INDEX DOCD: CPT | Mod: CPTII,S$GLB,, | Performed by: INTERNAL MEDICINE

## 2022-01-31 PROCEDURE — 1160F RVW MEDS BY RX/DR IN RCRD: CPT | Mod: CPTII,S$GLB,, | Performed by: INTERNAL MEDICINE

## 2022-01-31 PROCEDURE — 99215 OFFICE O/P EST HI 40 MIN: CPT | Mod: S$GLB,,, | Performed by: INTERNAL MEDICINE

## 2022-01-31 PROCEDURE — 1159F MED LIST DOCD IN RCRD: CPT | Mod: CPTII,S$GLB,, | Performed by: INTERNAL MEDICINE

## 2022-01-31 PROCEDURE — 3008F PR BODY MASS INDEX (BMI) DOCUMENTED: ICD-10-PCS | Mod: CPTII,S$GLB,, | Performed by: INTERNAL MEDICINE

## 2022-01-31 PROCEDURE — 1160F PR REVIEW ALL MEDS BY PRESCRIBER/CLIN PHARMACIST DOCUMENTED: ICD-10-PCS | Mod: CPTII,S$GLB,, | Performed by: INTERNAL MEDICINE

## 2022-01-31 PROCEDURE — 3079F DIAST BP 80-89 MM HG: CPT | Mod: CPTII,S$GLB,, | Performed by: INTERNAL MEDICINE

## 2022-01-31 PROCEDURE — 3074F SYST BP LT 130 MM HG: CPT | Mod: CPTII,S$GLB,, | Performed by: INTERNAL MEDICINE

## 2022-01-31 PROCEDURE — 1159F PR MEDICATION LIST DOCUMENTED IN MEDICAL RECORD: ICD-10-PCS | Mod: CPTII,S$GLB,, | Performed by: INTERNAL MEDICINE

## 2022-01-31 PROCEDURE — 3074F PR MOST RECENT SYSTOLIC BLOOD PRESSURE < 130 MM HG: ICD-10-PCS | Mod: CPTII,S$GLB,, | Performed by: INTERNAL MEDICINE

## 2022-01-31 PROCEDURE — 99215 PR OFFICE/OUTPT VISIT, EST, LEVL V, 40-54 MIN: ICD-10-PCS | Mod: S$GLB,,, | Performed by: INTERNAL MEDICINE

## 2022-01-31 PROCEDURE — 3079F PR MOST RECENT DIASTOLIC BLOOD PRESSURE 80-89 MM HG: ICD-10-PCS | Mod: CPTII,S$GLB,, | Performed by: INTERNAL MEDICINE

## 2022-01-31 RX ORDER — NYSTATIN 100000 U/G
1 OINTMENT TOPICAL 2 TIMES DAILY
COMMUNITY
Start: 2022-01-08

## 2022-01-31 RX ORDER — ETANERCEPT 50 MG/ML
50 SOLUTION SUBCUTANEOUS WEEKLY
Qty: 4 ML | Refills: 11
Start: 2022-01-31 | End: 2022-08-30

## 2022-01-31 RX ORDER — CERTOLIZUMAB PEGOL 200 MG/ML
200 INJECTION, SOLUTION SUBCUTANEOUS
Qty: 2 EACH | Refills: 11 | Status: SHIPPED | OUTPATIENT
Start: 2022-01-31 | End: 2023-02-01 | Stop reason: SDUPTHER

## 2022-01-31 RX ORDER — METHOTREXATE 2.5 MG/1
TABLET ORAL
Qty: 40 TABLET | Refills: 3 | Status: SHIPPED | OUTPATIENT
Start: 2022-01-31 | End: 2022-03-18 | Stop reason: SDUPTHER

## 2022-01-31 RX ORDER — FOLIC ACID 1 MG/1
1000 TABLET ORAL DAILY
Qty: 90 TABLET | Refills: 3 | Status: SHIPPED | OUTPATIENT
Start: 2022-01-31 | End: 2023-02-13 | Stop reason: SDUPTHER

## 2022-01-31 RX ORDER — CERTOLIZUMAB PEGOL 200 MG/ML
INJECTION, SOLUTION SUBCUTANEOUS
Qty: 1 EACH | Refills: 0 | Status: SHIPPED | OUTPATIENT
Start: 2022-01-31 | End: 2022-03-18 | Stop reason: SDUPTHER

## 2022-01-31 ASSESSMENT — ROUTINE ASSESSMENT OF PATIENT INDEX DATA (RAPID3)
PATIENT GLOBAL ASSESSMENT SCORE: 4
PAIN SCORE: 1
PSYCHOLOGICAL DISTRESS SCORE: 3.3
MDHAQ FUNCTION SCORE: 0.7
TOTAL RAPID3 SCORE: 2.44
FATIGUE SCORE: 3.3

## 2022-01-31 NOTE — PROGRESS NOTES
Subjective:          Chief Complaint: Annita Bettencourt is a 51 y.o. female who had no chief complaint listed for this encounter.    HPI:  Patient is a 51-year-old female here for consultation f/u sero+ high titer RA  COVID 1 month ago. Hands at MCP and wrist.   About 1 month ago with iritis with acute change in visual acuity and eye heaviness. Bilateral. Started on prednisilone gtt. X 2 weeks and immediate return upon [facility] (Dr. Goldstein). ? If this is related to RA. She completed HCQ scan. No prior hx of iritis.   Patient states she has dad persistent iritis since our last visit.     Regarding joints + am stiffness this is the worst. and gelling with prolonged sitting. Non-restorative sleep .     She is currently on  Enbrel (no URI s/sx but not as efficacious as yet at Humira for her joints).    MTX  25 mg once weekly   hydroxychloroquine 200 mgBID (since consultation as previously on once daily)   ibuprofen 800 mg t.i.d. p.r.n..    Tramadol 100mg TID PRN. - rare  Predniosne PRN has not used in nearly 1 year.   She has previously been on varying doses of prednisone PRN flares approx 1-2 monthly (50mg, 40mg, 30mg, 20mg and 10mg etc).     Previous medication:   Humira- congestion/HA .    Lost 20# off all red meat and sugar.     +AM stiffness <30min. Joint improve with activity.   The patient was diagnosed 5+  years ago    She has chronic dry eyes using systane. + dry mouth-  Patient currently has pain in her right shoulder right wrist of rates his pain at 2/10.    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 No MI  Still with sleep difficulty: Flexeril did not help. Doxepin 10mg not helping. Restless with sleep latency, stays asleep once down.    Component      Latest Ref Rng & Units 8/7/2018   NIL      See text IU/mL 0.050   TB1 - Nil      See text IU/mL -0.030   TB2 - Nil      See text IU/mL -0.030   Mitogen - Nil      See text IU/mL 8.140   TB Gold Plus       Negative    Hepatitis B Surface Ag       Negative   Hep B C IgM       Negative   Hep A IgM       Negative   Hepatitis C Ab       Negative   Rheumatoid Factor      0.0 - 15.0 IU/mL 397.0 (H)   CCP Antibodies      <5.0 U/mL 160.5 (H)   Sed Rate      0 - 20 mm/Hr 9   CRP      0.0 - 8.2 mg/L 1.9       REVIEW OF SYSTEMS:    Review of Systems   Constitutional: Positive for malaise/fatigue. Negative for fever and weight loss.   HENT: Negative for sore throat.    Eyes: Negative for double vision, photophobia and redness.   Respiratory: Negative for cough, shortness of breath and wheezing.    Cardiovascular: Negative for chest pain, palpitations and orthopnea.   Gastrointestinal: Negative for abdominal pain, constipation and diarrhea.   Genitourinary: Negative for dysuria, hematuria and urgency.   Musculoskeletal: Positive for joint pain and myalgias. Negative for back pain.   Skin: Negative for rash.   Neurological: Negative for dizziness, tingling, focal weakness and headaches.   Endo/Heme/Allergies: Does not bruise/bleed easily.   Psychiatric/Behavioral: Negative for depression, hallucinations and suicidal ideas.               Objective:            Past Medical History:   Diagnosis Date    Acid reflux     Anxiety     Depression     Diabetes mellitus     Enlarged heart     Hypertension     Murmur     Rheumatoid arthritis      Family History   Problem Relation Age of Onset    Hypertension Father      Social History     Tobacco Use    Smoking status: Current Every Day Smoker     Packs/day: 0.25     Years: 25.00     Pack years: 6.25    Smokeless tobacco: Never Used   Substance Use Topics    Alcohol use: Yes     Comment: socially    Drug use: No         Current Outpatient Medications on File Prior to Visit   Medication Sig Dispense Refill    amLODIPine (NORVASC) 10 MG tablet Take 10 mg by mouth once daily.      atenolol (TENORMIN) 25 MG tablet Take 25 mg by mouth once daily.  5    esomeprazole magnesium 20 mg TbEC Take  20 mg by mouth once daily.      etanercept (ENBREL SURECLICK) 50 mg/mL (1 mL) Inject 1 mL (50 mg total) into the skin once a week. 4 mL 11    FLUoxetine 40 MG capsule Take 40 mg by mouth once daily.      fluticasone propionate (FLONASE) 50 mcg/actuation nasal spray USE 1 SPRAY EVERY DAY IN EACH NOSTRIL 16 g 3    folic acid (FOLVITE) 1 MG tablet TAKE 1 TABLET BY MOUTH DAILY 90 tablet 3    hydrOXYchloroQUINE (PLAQUENIL) 200 mg tablet TAKE 1 TABLET BY MOUTH TWICE DAILY 60 tablet 3    ibuprofen (ADVIL,MOTRIN) 800 MG tablet TAKE 1 TABLET BY MOUTH 2 OR 3 TIMES A DAY AS NEEDED 90 tablet 2    losartan (COZAAR) 50 MG tablet Take 50 mg by mouth once daily.      metformin (GLUCOPHAGE) 500 MG tablet Take 500 mg by mouth once daily.      methotrexate 2.5 MG Tab TAKE 10 TABLETS BY MOUTH EVERY 7 DAYS 40 tablet 3    nystatin (MYCOSTATIN) ointment 1 application 2 (two) times daily.      ondansetron (ZOFRAN) 4 MG tablet 4 mg.      pravastatin (PRAVACHOL) 10 MG tablet Take 10 mg by mouth every evening.  5    prednisoLONE acetate (PRED FORTE) 1 % DrpS Place into both eyes.      predniSONE (DELTASONE) 5 MG tablet 20mg x 2 days, then 15mg x 2 days, then 10mg x 2 days then stop; may repeat x 1 40 tablet 2    traMADoL (ULTRAM) 50 mg tablet TAKE 1 OR 2 TABLETS BY MOUTH EVERY 8 HOURS AS NEEDED FOR PAIN 180 tablet 2    traZODone (DESYREL) 50 MG tablet TAKE 1 OR 2 TABLETS BY MOUTH EVERY EVENING 60 tablet 11    turmeric/turmeric ext/pepr ext (TURMERIC-TURMERIC EXT-PEPPER) 500-3 mg Cap Take 1 capsule by mouth every evening.       No current facility-administered medications on file prior to visit.       Vitals:    01/31/22 1559   BP: 124/83   Pulse: 75       Physical Exam:    Physical Exam  Constitutional:       Appearance: She is well-developed.   Eyes:      General: Lids are normal.   Neurological:      Mental Status: She is oriented to person, place, and time.   Psychiatric:         Behavior: Behavior normal.         Thought  Content: Thought content normal.               Assessment:       Encounter Diagnoses   Name Primary?    Rheumatoid arthritis involving both hands with positive rheumatoid factor Yes    Immunosuppression           Plan:        Rheumatoid arthritis involving both hands with positive rheumatoid factor  -     CBC Auto Differential; Standing; Expected date: 01/31/2022  -     Comprehensive Metabolic Panel; Standing; Expected date: 01/31/2022  -     Sedimentation rate; Standing; Expected date: 01/31/2022  -     C-Reactive Protein; Standing; Expected date: 01/31/2022  -     Quantiferon Gold TB; Future; Expected date: 01/31/2022    Immunosuppression  -     CBC Auto Differential; Standing; Expected date: 01/31/2022  -     Comprehensive Metabolic Panel; Standing; Expected date: 01/31/2022  -     Sedimentation rate; Standing; Expected date: 01/31/2022  -     C-Reactive Protein; Standing; Expected date: 01/31/2022  -     Quantiferon Gold TB; Future; Expected date: 01/31/2022    Malaise and fatigue  -     Quantiferon Gold TB; Future; Expected date: 01/31/2022         Patient is a 51-year-old female with a history of rheumatoid arthritis     Ok for prednisone if flares as you have done in past.     Failed Humira with URI/HA  Started Enbrel Sureclick 50mg Qweekly ( 3/21/20 start date. ) still not at full efficacy.-doing well.   MTX 10 tabs weekly (noting some memory issues ? COVID, will decrease MTX to 8 tabs weekly and reassess after 6 weeks if any better)   HCQ 200mg BID-UTD 2019    No URI s/sx with Enbrel. But persistent Iritis for over 6 months concerned she will have vision damage I want to appeal to insurance for Cimzia which does have data or IRITIS and RA. She has already failed Humira.      prednisone 5mg if needed. Has not filled or used in nearly 1 year.   Labs tomorrow in [place]    Patient need thor 4 plus TB test can we do this all in [facility] tomorrow?     If not just get thor 4 and set up quant gold (I  printed quant gold to to at [facility] if not)     Sending in for Cimzia     Patient is aware of the risks benefits side effects and monitoring requirements of biologic therapy including but not limited to disseminated tuberculosis recurrent serious infections suppression of the immune system, injection site reactions.  F/u 4 months.     Follow up in about 4 months (around 5/31/2022).      30min consultation with greater than 50% spent in counseling, chart review and coordination of care. All questions answered.

## 2022-01-31 NOTE — PATIENT INSTRUCTIONS
[facility] with labs.  [address] [place] [address]  Phone: [phone]      2. If [facility] cannot do the Quantiferon GOLD printed copy to [facility] do as soon as you can.       3. Ordering Cimzia.

## 2022-02-01 ENCOUNTER — LAB VISIT (OUTPATIENT)
Dept: FAMILY MEDICINE | Facility: CLINIC | Age: 52
End: 2022-02-01
Payer: COMMERCIAL

## 2022-02-01 DIAGNOSIS — R53.81 MALAISE AND FATIGUE: ICD-10-CM

## 2022-02-01 DIAGNOSIS — R53.83 MALAISE AND FATIGUE: ICD-10-CM

## 2022-02-01 DIAGNOSIS — M05.742 RHEUMATOID ARTHRITIS INVOLVING BOTH HANDS WITH POSITIVE RHEUMATOID FACTOR: ICD-10-CM

## 2022-02-01 DIAGNOSIS — M05.741 RHEUMATOID ARTHRITIS INVOLVING BOTH HANDS WITH POSITIVE RHEUMATOID FACTOR: ICD-10-CM

## 2022-02-01 DIAGNOSIS — D84.9 IMMUNOSUPPRESSION: ICD-10-CM

## 2022-02-01 LAB
ALBUMIN SERPL BCP-MCNC: 3.8 G/DL (ref 3.5–5.2)
ALP SERPL-CCNC: 61 U/L (ref 55–135)
ALT SERPL W/O P-5'-P-CCNC: 21 U/L (ref 10–44)
ANION GAP SERPL CALC-SCNC: 8 MMOL/L (ref 8–16)
AST SERPL-CCNC: 14 U/L (ref 10–40)
BILIRUB SERPL-MCNC: 0.4 MG/DL (ref 0.1–1)
BUN SERPL-MCNC: 19 MG/DL (ref 6–20)
CALCIUM SERPL-MCNC: 9.8 MG/DL (ref 8.7–10.5)
CHLORIDE SERPL-SCNC: 106 MMOL/L (ref 95–110)
CO2 SERPL-SCNC: 26 MMOL/L (ref 23–29)
CREAT SERPL-MCNC: 0.9 MG/DL (ref 0.5–1.4)
CRP SERPL-MCNC: 2.3 MG/L (ref 0–8.2)
ERYTHROCYTE [SEDIMENTATION RATE] IN BLOOD BY WESTERGREN METHOD: 29 MM/HR (ref 0–36)
EST. GFR  (AFRICAN AMERICAN): >60 ML/MIN/1.73 M^2
EST. GFR  (NON AFRICAN AMERICAN): >60 ML/MIN/1.73 M^2
GLUCOSE SERPL-MCNC: 103 MG/DL (ref 70–110)
POTASSIUM SERPL-SCNC: 4.7 MMOL/L (ref 3.5–5.1)
PROT SERPL-MCNC: 6.6 G/DL (ref 6–8.4)
SODIUM SERPL-SCNC: 140 MMOL/L (ref 136–145)

## 2022-02-01 PROCEDURE — 36415 COLL VENOUS BLD VENIPUNCTURE: CPT | Mod: S$GLB,,, | Performed by: INTERNAL MEDICINE

## 2022-02-01 PROCEDURE — 86480 TB TEST CELL IMMUN MEASURE: CPT | Performed by: INTERNAL MEDICINE

## 2022-02-01 PROCEDURE — 36415 PR COLLECTION VENOUS BLOOD,VENIPUNCTURE: ICD-10-PCS | Mod: S$GLB,,, | Performed by: INTERNAL MEDICINE

## 2022-02-01 PROCEDURE — 85025 COMPLETE CBC W/AUTO DIFF WBC: CPT | Performed by: INTERNAL MEDICINE

## 2022-02-01 PROCEDURE — 85652 RBC SED RATE AUTOMATED: CPT | Performed by: INTERNAL MEDICINE

## 2022-02-01 PROCEDURE — 86140 C-REACTIVE PROTEIN: CPT | Performed by: INTERNAL MEDICINE

## 2022-02-01 PROCEDURE — 80053 COMPREHEN METABOLIC PANEL: CPT | Performed by: INTERNAL MEDICINE

## 2022-02-01 NOTE — PROGRESS NOTES
Venipuncture performed with 23 gauge butterfly, x's 2 attempt.  Successful venipuncture to R Basilic vein.  Specimens collected per orders.      Pressure dressing applied to site, instructed patient to remove dressing in 10-15 minutes, OK to re-adjust dressing if pressure causing any discomfort, to observe closely for numbness and/or discoloration to hand or fingers, and to notify provider if bleeding persists after applying constant pressure lasting 30 minutes.

## 2022-02-02 LAB
BASOPHILS # BLD AUTO: 0.08 K/UL (ref 0–0.2)
BASOPHILS NFR BLD: 1.2 % (ref 0–1.9)
DIFFERENTIAL METHOD: ABNORMAL
EOSINOPHIL # BLD AUTO: 0.2 K/UL (ref 0–0.5)
EOSINOPHIL NFR BLD: 3.4 % (ref 0–8)
ERYTHROCYTE [DISTWIDTH] IN BLOOD BY AUTOMATED COUNT: 17.1 % (ref 11.5–14.5)
HCT VFR BLD AUTO: 42.2 % (ref 37–48.5)
HGB BLD-MCNC: 12.8 G/DL (ref 12–16)
IMM GRANULOCYTES # BLD AUTO: 0.02 K/UL (ref 0–0.04)
IMM GRANULOCYTES NFR BLD AUTO: 0.3 % (ref 0–0.5)
LYMPHOCYTES # BLD AUTO: 1.5 K/UL (ref 1–4.8)
LYMPHOCYTES NFR BLD: 23.8 % (ref 18–48)
MCH RBC QN AUTO: 25.8 PG (ref 27–31)
MCHC RBC AUTO-ENTMCNC: 30.3 G/DL (ref 32–36)
MCV RBC AUTO: 85 FL (ref 82–98)
MONOCYTES # BLD AUTO: 0.5 K/UL (ref 0.3–1)
MONOCYTES NFR BLD: 7.6 % (ref 4–15)
NEUTROPHILS # BLD AUTO: 4.1 K/UL (ref 1.8–7.7)
NEUTROPHILS NFR BLD: 63.7 % (ref 38–73)
NRBC BLD-RTO: 0 /100 WBC
PLATELET # BLD AUTO: 261 K/UL (ref 150–450)
PMV BLD AUTO: 12.2 FL (ref 9.2–12.9)
RBC # BLD AUTO: 4.97 M/UL (ref 4–5.4)
WBC # BLD AUTO: 6.43 K/UL (ref 3.9–12.7)

## 2022-02-03 LAB
GAMMA INTERFERON BACKGROUND BLD IA-ACNC: 0.02 IU/ML
M TB IFN-G CD4+ BCKGRND COR BLD-ACNC: -0.01 IU/ML
MITOGEN IGNF BCKGRD COR BLD-ACNC: >10 IU/ML
TB GOLD PLUS: NEGATIVE
TB2 - NIL: 0 IU/ML

## 2022-02-04 NOTE — TELEPHONE ENCOUNTER
BENEFIT INVESTIGATION:  ENBREL    [employer] [phone]  [facility] in network  Deductible: $0  OOP max: $5000 - $390.63 applied  Co pay: $80 // Enbrel CCOF          [facility]

## 2022-02-06 ENCOUNTER — SPECIALTY PHARMACY (OUTPATIENT)
Dept: PHARMACY | Facility: CLINIC | Age: 52
End: 2022-02-06
Payer: COMMERCIAL

## 2022-02-06 NOTE — TELEPHONE ENCOUNTER
Cimzia for RA orders received. prior authorization required [facility]: [id number]. CaseId:[id number] Approved through 08/05/2022. Order queued for benefits investigation completion    Therapy switched from Enbrel

## 2022-02-07 NOTE — TELEPHONE ENCOUNTER
BENEFIT INVESTIGATION:  KAL    [facility] [phone]  [facility] in network  Deductible: $0  OOP max: $5000 - $398.83 applied  Co pay: $80 // $80  Forwarded to FA team for review.          [facility]

## 2022-02-23 DIAGNOSIS — D84.9 IMMUNOSUPPRESSED STATUS: ICD-10-CM

## 2022-02-26 ENCOUNTER — PATIENT MESSAGE (OUTPATIENT)
Dept: RHEUMATOLOGY | Facility: CLINIC | Age: 52
End: 2022-02-26
Payer: COMMERCIAL

## 2022-02-26 DIAGNOSIS — M05.742 RHEUMATOID ARTHRITIS INVOLVING BOTH HANDS WITH POSITIVE RHEUMATOID FACTOR: ICD-10-CM

## 2022-02-26 DIAGNOSIS — M05.741 RHEUMATOID ARTHRITIS INVOLVING BOTH HANDS WITH POSITIVE RHEUMATOID FACTOR: ICD-10-CM

## 2022-02-26 RX ORDER — CERTOLIZUMAB PEGOL 200 MG/ML
INJECTION, SOLUTION SUBCUTANEOUS
Qty: 1 EACH | Refills: 0 | Status: CANCELLED | OUTPATIENT
Start: 2022-02-26

## 2022-02-28 ENCOUNTER — PATIENT MESSAGE (OUTPATIENT)
Dept: RHEUMATOLOGY | Facility: CLINIC | Age: 52
End: 2022-02-28
Payer: COMMERCIAL

## 2022-03-01 NOTE — TELEPHONE ENCOUNTER
Patient has cimzia order already in process from 1/31/22 to [facility].   She is looking for new fill but this was pending financial aid from [facility].     She will need to call them to see where the process is on this. Dr. BELTRAN

## 2022-03-04 ENCOUNTER — PATIENT MESSAGE (OUTPATIENT)
Dept: RHEUMATOLOGY | Facility: CLINIC | Age: 52
End: 2022-03-04
Payer: COMMERCIAL

## 2022-03-04 NOTE — TELEPHONE ENCOUNTER
Incoming call from patient requesting status update on new prescription for Cimzia. Informed pt PA has been approved and medication has an $80 copay. Pt grants permission for [facility] to obtain copay card on her behalf. Forwarding to PCA team.

## 2022-03-07 NOTE — TELEPHONE ENCOUNTER
"Attempted to sign patient up for Cimza coupon card. Got notice saying    "Thank you for requesting a [employer]® savings card. A representative will call you within a few days to determine your eligibility and complete your enrollment, or if its more convenient, you can reach us at [phone], Monday-Friday, 8 a.m.-8 p.m. ET."        JAYNE  "

## 2022-03-10 ENCOUNTER — PATIENT MESSAGE (OUTPATIENT)
Dept: RHEUMATOLOGY | Facility: CLINIC | Age: 52
End: 2022-03-10
Payer: COMMERCIAL

## 2022-03-11 NOTE — TELEPHONE ENCOUNTER
Patient called to check the status of her Cimzia Rx. Notified patient Cimzia was approved through her insurance with an $80 copay, but she can call [phone] to finish applying for a Cimzia copay card. Patient will call now and follow up with [facility] once she obtains a copay card.

## 2022-03-12 NOTE — TELEPHONE ENCOUNTER
Incoming call from patient. Provided cimzia copay card information. Placed into [facility].    BIN: [id number]  PCN: [id number]  GRP: [id number]  ID: [id number]    Will alert [facility]. Patient looking to start ASAP and would like initial consult 3/14.

## 2022-03-14 ENCOUNTER — SPECIALTY PHARMACY (OUTPATIENT)
Dept: PHARMACY | Facility: CLINIC | Age: 52
End: 2022-03-14
Payer: COMMERCIAL

## 2022-03-14 DIAGNOSIS — M06.9 RHEUMATOID ARTHRITIS, INVOLVING UNSPECIFIED SITE, UNSPECIFIED WHETHER RHEUMATOID FACTOR PRESENT: Primary | ICD-10-CM

## 2022-03-14 NOTE — TELEPHONE ENCOUNTER
Specialty Pharmacy - Initial Clinical Assessment    Specialty Medication Orders Linked to Encounter    Flowsheet Row Most Recent Value   Medication #1 certolizumab pegol (CIMZIA STARTER KIT) 400 mg/2 mL (200 mg/mL x 2) SyKt (Order#[id number], Rx#[id number])        Patient Diagnosis   M06.9 - Rheumatoid arthritis, involving unspecified site, unspecified whether rheumatoid factor present    Subjective    Annita Bettencourt is a 51 y.o. female, who is followed by the specialty pharmacy service for management and education.    Recent Encounters     Date Type Provider Description    03/14/2022 Specialty Pharmacy Anita Francis PharmD Initial Clinical Assessment    02/06/2022 Specialty Pharmacy Anita Francis PharmD Referral Authorization    01/20/2022 Specialty Pharmacy Kodi Chan Referral Authorization; Refill Coordination    12/20/2021 Specialty Pharmacy Kodi Chan Refill Coordination    11/22/2021 Specialty Pharmacy Fanz Joycelyn Refill Coordination        Clinical call attempts since last clinical assessment   No call attempts found.     Current Outpatient Medications   Medication Sig    amLODIPine (NORVASC) 10 MG tablet Take 10 mg by mouth once daily.    atenolol (TENORMIN) 25 MG tablet Take 25 mg by mouth once daily.    certolizumab pegol (CIMZIA STARTER KIT) 400 mg/2 mL (200 mg/mL x 2) SyKt Inject 2 mL (400mg) into the skin on week 0, week 2, and week 4    certolizumab pegol (CIMZIA) 400 mg/2 mL (200 mg/mL x 2) SyKt Inject 1 mL (200 mg total) into the skin every 14 (fourteen) days.    esomeprazole magnesium 20 mg TbEC Take 20 mg by mouth once daily.    etanercept (ENBREL SURECLICK) 50 mg/mL (1 mL) Inject 1 mL (50 mg total) into the skin once a week.    FLUoxetine 40 MG capsule Take 40 mg by mouth once daily.    fluticasone propionate (FLONASE) 50 mcg/actuation nasal spray USE 1 SPRAY EVERY DAY IN EACH NOSTRIL    folic acid (FOLVITE) 1 MG tablet Take 1 tablet (1,000 mcg total) by mouth once daily.     hydrOXYchloroQUINE (PLAQUENIL) 200 mg tablet TAKE 1 TABLET BY MOUTH TWICE DAILY    ibuprofen (ADVIL,MOTRIN) 800 MG tablet TAKE 1 TABLET BY MOUTH 2 OR 3 TIMES A DAY AS NEEDED    losartan (COZAAR) 50 MG tablet Take 50 mg by mouth once daily.    metformin (GLUCOPHAGE) 500 MG tablet Take 500 mg by mouth once daily.    methotrexate 2.5 MG Tab TAKE 10 TABLETS BY MOUTH EVERY 7 DAYS    nystatin (MYCOSTATIN) ointment 1 application 2 (two) times daily.    ondansetron (ZOFRAN) 4 MG tablet 4 mg.    pravastatin (PRAVACHOL) 10 MG tablet Take 10 mg by mouth every evening.    prednisoLONE acetate (PRED FORTE) 1 % DrpS Place into both eyes.    predniSONE (DELTASONE) 5 MG tablet 20mg x 2 days, then 15mg x 2 days, then 10mg x 2 days then stop; may repeat x 1    traMADoL (ULTRAM) 50 mg tablet TAKE 1 OR 2 TABLETS BY MOUTH EVERY 8 HOURS AS NEEDED FOR PAIN    traZODone (DESYREL) 50 MG tablet TAKE 1 OR 2 TABLETS BY MOUTH EVERY EVENING    turmeric/turmeric ext/pepr ext (TURMERIC-TURMERIC EXT-PEPPER) 500-3 mg Cap Take 1 capsule by mouth every evening.   Last reviewed on 1/31/2022  4:33 PM by Agnieszka Schwab, DO    Review of patient's allergies indicates:   Allergen Reactions    Sulfa (sulfonamide antibiotics) Rash   Last reviewed on  1/31/2022 4:33 PM by Agnieszka Schwab    Drug Interactions    Drug interactions evaluated: yes  Clinically relevant drug interactions identified: no  Provided the patient with educational material regarding drug interactions: not applicable       Medication Adherence    Adherence tools used: calendar  Support network for adherence: family member       Adverse Effects    *All other systems reviewed and are negative       Assessment Questions - Documented Responses    Flowsheet Row Most Recent Value   Assessment    Medication Reconciliation completed for patient Yes   During the past 4 weeks, has patient missed any activities due to condition or medication? No   During the past 4 weeks, did patient  "have any of the following urgent care visits? None   Goals of Therapy Status Discussed (new start)   Status of the patients ability to self-administer: Is Able   All education points have been covered with patient? Yes, supplemental printed education provided   Plan Therapy being initiated   Do you need to open a clinical intervention (i-vent)? No   Do you want to schedule first shipment? Yes        Refill Questions - Documented Responses    Flowsheet Row Most Recent Value   Patient Availability and HIPAA Verification    Does patient want to proceed with activity? Yes   HIPAA/medical authority confirmed? Yes   Relationship to patient of person spoken to? Self   Refill Screening Questions    When does the patient need to receive the medication? 03/16/22   Refill Delivery Questions    How will the patient receive the medication? Mail   When does the patient need to receive the medication? 03/16/22   Shipping Address Home   Address in [facility] confirmed and updated if neccessary? Yes   Expected Copay ($) 0   Is the patient able to afford the medication copay? Yes   Payment Method zero copay   Days supply of Refill 42   Supplies needed? No supplies needed   Refill activity completed? Yes   Refill activity plan Refill scheduled   Shipment/Pickup Date: 03/15/22          Objective    She has a past medical history of Acid reflux, Anxiety, Depression, Diabetes mellitus, Enlarged heart, Hypertension, Murmur, and Rheumatoid arthritis.    Tried/failed medications: Enbrel, MTX, Plaquenil, Humira    BP Readings from Last 4 Encounters:   01/31/22 124/83   09/22/21 135/81   02/18/20 117/66   10/16/19 126/77     Ht Readings from Last 4 Encounters:   01/31/22 5' 2" (1.575 m)   09/22/21 5' 2" (1.575 m)   04/29/21 5' 2" (1.575 m)   10/29/20 5' 2" (1.575 m)     Wt Readings from Last 4 Encounters:   01/31/22 109.4 kg (241 lb 2.9 oz)   09/22/21 111.5 kg (245 lb 13 oz)   04/29/21 105.4 kg (232 lb 5.8 oz)   10/29/20 105.4 kg (232 " lb 5.8 oz)     Recent Labs   Lab Result Units 02/01/22  1031   Creatinine mg/dL 0.9   ALT U/L 21   AST U/L 14     The goals of rheumatoid arthritis treatment include:  · Relieving pain and suppressing inflammation  · Achieving remission and preventing joint and organ damage  · Increasing joint mobility and strength  · Preventing infection and other complications of treatment  · Reducing long term complications of rheumatoid arthritis  · Improving or maintaining physical function and optimal well-being  · Improving or maintaining quality of life  · Maintaining optimal therapy adherence  · Minimizing and managing side effects    Goals of Therapy Status: Discussed (new start)    Assessment/Plan  Patient plans to start RA treatment with Cimzia  Indication, dosage, appropriateness, effectiveness, safety and convenience of her specialty medication(s) were reviewed today.     Patient Education   Patient received education on the following:    Expectations and possible outcomes of therapy   Proper use, timely administration, and missed dose management   Duration of therapy   Side effects, including prevention, minimization, and management   Contraindications and safety precautions   New or changed medications, including prescribe and over the counter medications and supplements   Reviews recommended vaccinations, as appropriate   Storage, safe handling, and disposal    Cimzia starter and maintenance prescriptions reviewed, dose appropriate for RA. TB negtive 2/1/22, hepB negative 2018. Rheum OV and labs revewied. Med list, allergies, PMhx revewied. previsouly on Enbrel Therapy appropriate.      Cimzia initial consult complete. Pt reports last dose Enbrel about 3 weeks ago no doses on hand. She is aware to not take Cimzia with Enbrel. She reports Dr. Schwab switched to Cimzia d/t potentially benefit with iritis. She reports joint pain and tenderness she reports in her shoulder and elbows. Pt familiar with [facility]  services.     Tasks added this encounter   4/20/2022 - Refill Call (Auto Added)   Tasks due within next 3 months   No tasks due.     Anita Francis, PharmD  [facility]  [address]  [facility] [address]  Phone: [phone]  Fax: [phone]

## 2022-03-18 DIAGNOSIS — M05.742 RHEUMATOID ARTHRITIS INVOLVING BOTH HANDS WITH POSITIVE RHEUMATOID FACTOR: ICD-10-CM

## 2022-03-18 DIAGNOSIS — M05.741 RHEUMATOID ARTHRITIS INVOLVING BOTH HANDS WITH POSITIVE RHEUMATOID FACTOR: ICD-10-CM

## 2022-03-18 NOTE — TELEPHONE ENCOUNTER
[facility] dispensed Cimzia 2mL out of the 6 mL needed for Starter pack loading dose- insurance will not allow refill until 3/24 (pts is due for week 2 injection 3/31/22). Reminder set and refill activity updated to reach out to pt in 1 week to schedule delivery of week 2 and week 4 dose

## 2022-03-23 RX ORDER — METHOTREXATE 2.5 MG/1
TABLET ORAL
Qty: 40 TABLET | Refills: 3 | Status: SHIPPED | OUTPATIENT
Start: 2022-03-23 | End: 2022-06-23 | Stop reason: SDUPTHER

## 2022-04-18 ENCOUNTER — PATIENT MESSAGE (OUTPATIENT)
Dept: ADMINISTRATIVE | Facility: OTHER | Age: 52
End: 2022-04-18
Payer: COMMERCIAL

## 2022-04-26 ENCOUNTER — SPECIALTY PHARMACY (OUTPATIENT)
Dept: PHARMACY | Facility: CLINIC | Age: 52
End: 2022-04-26
Payer: COMMERCIAL

## 2022-04-26 DIAGNOSIS — M06.9 RHEUMATOID ARTHRITIS, INVOLVING UNSPECIFIED SITE, UNSPECIFIED WHETHER RHEUMATOID FACTOR PRESENT: Primary | ICD-10-CM

## 2022-04-26 NOTE — TELEPHONE ENCOUNTER
Specialty Pharmacy - Refill Coordination    Specialty Medication Orders Linked to Encounter    Flowsheet Row Most Recent Value   Medication #1 certolizumab pegol (CIMZIA) 400 mg/2 mL (200 mg/mL x 2) SyKt (Order#[id number], Rx#[id number])          Refill Questions - Documented Responses    Flowsheet Row Most Recent Value   Patient Availability and HIPAA Verification    Does patient want to proceed with activity? Yes   HIPAA/medical authority confirmed? Yes   Relationship to patient of person spoken to? Self   Refill Screening Questions    Changes to allergies? No   Changes to medications? No   New conditions since last clinic visit? No   Unplanned office visit, urgent care, ED, or hospital admission in the last 4 weeks? No   How does patient/caregiver feel medication is working? Good   Financial problems or insurance changes? No   How many doses of your specialty medications were missed in the last 4 weeks? 0   Would patient like to speak to a pharmacist? No   When does the patient need to receive the medication? 04/28/22   Refill Delivery Questions    How will the patient receive the medication? Delivery Jocelyn   When does the patient need to receive the medication? 04/28/22   Shipping Address Home   Address in [facility] confirmed and updated if neccessary? Yes   Expected Copay ($) 0   Is the patient able to afford the medication copay? Yes   Payment Method zero copay   Days supply of Refill 28   Supplies needed? No supplies needed   Refill activity completed? Yes   Refill activity plan Refill scheduled   Shipment/Pickup Date: 04/27/22          Current Outpatient Medications   Medication Sig    amLODIPine (NORVASC) 10 MG tablet Take 10 mg by mouth once daily.    atenolol (TENORMIN) 25 MG tablet Take 25 mg by mouth once daily.    certolizumab pegol (CIMZIA STARTER KIT) 400 mg/2 mL (200 mg/mL x 2) SyKt Inject 2 mL (400mg) into the skin on week 0, week 2, and week 4    certolizumab pegol (CIMZIA) 400 mg/2 mL  (200 mg/mL x 2) SyKt Inject 1 mL (200 mg total) into the skin every 14 (fourteen) days.    esomeprazole magnesium 20 mg TbEC Take 20 mg by mouth once daily.    etanercept (ENBREL SURECLICK) 50 mg/mL (1 mL) Inject 1 mL (50 mg total) into the skin once a week.    FLUoxetine 40 MG capsule Take 40 mg by mouth once daily.    fluticasone propionate (FLONASE) 50 mcg/actuation nasal spray USE 1 SPRAY EVERY DAY IN EACH NOSTRIL    folic acid (FOLVITE) 1 MG tablet Take 1 tablet (1,000 mcg total) by mouth once daily.    hydrOXYchloroQUINE (PLAQUENIL) 200 mg tablet TAKE 1 TABLET BY MOUTH TWICE DAILY    ibuprofen (ADVIL,MOTRIN) 800 MG tablet TAKE 1 TABLET BY MOUTH 2 OR 3 TIMES A DAY AS NEEDED    losartan (COZAAR) 50 MG tablet Take 50 mg by mouth once daily.    metformin (GLUCOPHAGE) 500 MG tablet Take 500 mg by mouth once daily.    methotrexate 2.5 MG Tab TAKE 10 TABLETS BY MOUTH EVERY 7 DAYS    nystatin (MYCOSTATIN) ointment 1 application 2 (two) times daily.    ondansetron (ZOFRAN) 4 MG tablet 4 mg.    pravastatin (PRAVACHOL) 10 MG tablet Take 10 mg by mouth every evening.    prednisoLONE acetate (PRED FORTE) 1 % DrpS Place into both eyes.    predniSONE (DELTASONE) 5 MG tablet 20mg x 2 days, then 15mg x 2 days, then 10mg x 2 days then stop; may repeat x 1    traMADoL (ULTRAM) 50 mg tablet TAKE 1 OR 2 TABLETS BY MOUTH EVERY 8 HOURS AS NEEDED FOR PAIN    traZODone (DESYREL) 50 MG tablet TAKE 1 OR 2 TABLETS BY MOUTH EVERY EVENING    turmeric/turmeric ext/pepr ext (TURMERIC-TURMERIC EXT-PEPPER) 500-3 mg Cap Take 1 capsule by mouth every evening.   Last reviewed on 1/31/2022  4:33 PM by Agnieszka Schwab,     Review of patient's allergies indicates:   Allergen Reactions    Sulfa (sulfonamide antibiotics) Rash    Last reviewed on  1/31/2022 4:33 PM by Agnieszka Schwab      Tasks added this encounter   5/19/2022 - Refill Call (Auto Added)   Tasks due within next 3 months   No tasks due.     Anita Francis, PharmD  [facility]  [facility] [facility]  [place] [address]  Phone: [phone]  Fax: [phone]       Patient/Caregiver provided printed discharge information.

## 2022-05-19 ENCOUNTER — PATIENT MESSAGE (OUTPATIENT)
Dept: PHARMACY | Facility: CLINIC | Age: 52
End: 2022-05-19
Payer: COMMERCIAL

## 2022-05-24 ENCOUNTER — SPECIALTY PHARMACY (OUTPATIENT)
Dept: PHARMACY | Facility: CLINIC | Age: 52
End: 2022-05-24
Payer: COMMERCIAL

## 2022-05-24 NOTE — TELEPHONE ENCOUNTER
Specialty Pharmacy - Refill Coordination    Specialty Medication Orders Linked to Encounter    Flowsheet Row Most Recent Value   Medication #1 certolizumab pegol (CIMZIA) 400 mg/2 mL (200 mg/mL x 2) SyKt (Order#[id number], Rx#[id number])          Refill Questions - Documented Responses    Flowsheet Row Most Recent Value   Patient Availability and HIPAA Verification    Does patient want to proceed with activity? Yes   HIPAA/medical authority confirmed? Yes   Relationship to patient of person spoken to? Self   Refill Screening Questions    Changes to allergies? No   Changes to medications? No   New conditions since last clinic visit? No   Unplanned office visit, urgent care, ED, or hospital admission in the last 4 weeks? No   How does patient/caregiver feel medication is working? Good   Financial problems or insurance changes? No   How many doses of your specialty medications were missed in the last 4 weeks? 0   Would patient like to speak to a pharmacist? No   When does the patient need to receive the medication? 05/31/22   Refill Delivery Questions    How will the patient receive the medication? Delivery Jocelyn   When does the patient need to receive the medication? 05/31/22   Shipping Address Home   Address in [facility] confirmed and updated if neccessary? Yes   Expected Copay ($) 0   Is the patient able to afford the medication copay? Yes   Payment Method zero copay   Days supply of Refill 28   Supplies needed? No supplies needed   Refill activity completed? Yes   Refill activity plan Refill scheduled   Shipment/Pickup Date: 05/26/22          Current Outpatient Medications   Medication Sig    amLODIPine (NORVASC) 10 MG tablet Take 10 mg by mouth once daily.    atenolol (TENORMIN) 25 MG tablet Take 25 mg by mouth once daily.    certolizumab pegol (CIMZIA STARTER KIT) 400 mg/2 mL (200 mg/mL x 2) SyKt Inject 2 mL (400mg) into the skin on week 0, week 2, and week 4    certolizumab pegol (CIMZIA) 400 mg/2 mL  (200 mg/mL x 2) SyKt Inject 1 mL (200 mg total) into the skin every 14 (fourteen) days.    esomeprazole magnesium 20 mg TbEC Take 20 mg by mouth once daily.    etanercept (ENBREL SURECLICK) 50 mg/mL (1 mL) Inject 1 mL (50 mg total) into the skin once a week.    FLUoxetine 40 MG capsule Take 40 mg by mouth once daily.    fluticasone propionate (FLONASE) 50 mcg/actuation nasal spray USE 1 SPRAY EVERY DAY IN EACH NOSTRIL    folic acid (FOLVITE) 1 MG tablet Take 1 tablet (1,000 mcg total) by mouth once daily.    hydrOXYchloroQUINE (PLAQUENIL) 200 mg tablet TAKE 1 TABLET BY MOUTH TWICE DAILY    ibuprofen (ADVIL,MOTRIN) 800 MG tablet TAKE 1 TABLET BY MOUTH 2 OR 3 TIMES A DAY AS NEEDED    losartan (COZAAR) 50 MG tablet Take 50 mg by mouth once daily.    metformin (GLUCOPHAGE) 500 MG tablet Take 500 mg by mouth once daily.    methotrexate 2.5 MG Tab TAKE 10 TABLETS BY MOUTH EVERY 7 DAYS    nystatin (MYCOSTATIN) ointment 1 application 2 (two) times daily.    ondansetron (ZOFRAN) 4 MG tablet 4 mg.    pravastatin (PRAVACHOL) 10 MG tablet Take 10 mg by mouth every evening.    prednisoLONE acetate (PRED FORTE) 1 % DrpS Place into both eyes.    predniSONE (DELTASONE) 5 MG tablet 20mg x 2 days, then 15mg x 2 days, then 10mg x 2 days then stop; may repeat x 1    traMADoL (ULTRAM) 50 mg tablet TAKE 1 OR 2 TABLETS BY MOUTH EVERY 8 HOURS AS NEEDED FOR PAIN    traZODone (DESYREL) 50 MG tablet TAKE 1 OR 2 TABLETS BY MOUTH EVERY EVENING    turmeric/turmeric ext/pepr ext (TURMERIC-TURMERIC EXT-PEPPER) 500-3 mg Cap Take 1 capsule by mouth every evening.   Last reviewed on 1/31/2022  4:33 PM by Agnieszka Schwab DO    Review of patient's allergies indicates:   Allergen Reactions    Sulfa (sulfonamide antibiotics) Rash    Last reviewed on  1/31/2022 4:33 PM by Agnieszka Schwab      Tasks added this encounter   6/21/2022 - Refill Call (Auto Added)   Tasks due within next 3 months   No tasks due.     [facility]  [address]  [facility] [address]  Phone: [phone]  Fax: [phone]

## 2022-05-26 ENCOUNTER — OFFICE VISIT (OUTPATIENT)
Dept: RHEUMATOLOGY | Facility: CLINIC | Age: 52
End: 2022-05-26
Payer: COMMERCIAL

## 2022-05-26 ENCOUNTER — LAB VISIT (OUTPATIENT)
Dept: LAB | Facility: HOSPITAL | Age: 52
End: 2022-05-26
Attending: INTERNAL MEDICINE
Payer: COMMERCIAL

## 2022-05-26 VITALS
BODY MASS INDEX: 45.28 KG/M2 | HEART RATE: 103 BPM | DIASTOLIC BLOOD PRESSURE: 84 MMHG | HEIGHT: 62 IN | WEIGHT: 246.06 LBS | SYSTOLIC BLOOD PRESSURE: 140 MMHG

## 2022-05-26 DIAGNOSIS — M05.741 RHEUMATOID ARTHRITIS INVOLVING BOTH HANDS WITH POSITIVE RHEUMATOID FACTOR: Primary | ICD-10-CM

## 2022-05-26 DIAGNOSIS — M05.742 RHEUMATOID ARTHRITIS INVOLVING BOTH HANDS WITH POSITIVE RHEUMATOID FACTOR: ICD-10-CM

## 2022-05-26 DIAGNOSIS — D84.9 IMMUNOSUPPRESSION: ICD-10-CM

## 2022-05-26 DIAGNOSIS — M05.741 RHEUMATOID ARTHRITIS INVOLVING BOTH HANDS WITH POSITIVE RHEUMATOID FACTOR: ICD-10-CM

## 2022-05-26 DIAGNOSIS — M05.742 RHEUMATOID ARTHRITIS INVOLVING BOTH HANDS WITH POSITIVE RHEUMATOID FACTOR: Primary | ICD-10-CM

## 2022-05-26 LAB
ALBUMIN SERPL BCP-MCNC: 4 G/DL (ref 3.5–5.2)
ALP SERPL-CCNC: 66 U/L (ref 55–135)
ALT SERPL W/O P-5'-P-CCNC: 24 U/L (ref 10–44)
ANION GAP SERPL CALC-SCNC: 13 MMOL/L (ref 8–16)
AST SERPL-CCNC: 13 U/L (ref 10–40)
BASOPHILS # BLD AUTO: 0.1 K/UL (ref 0–0.2)
BASOPHILS NFR BLD: 1 % (ref 0–1.9)
BILIRUB SERPL-MCNC: 0.2 MG/DL (ref 0.1–1)
BUN SERPL-MCNC: 13 MG/DL (ref 6–20)
CALCIUM SERPL-MCNC: 9.9 MG/DL (ref 8.7–10.5)
CHLORIDE SERPL-SCNC: 105 MMOL/L (ref 95–110)
CO2 SERPL-SCNC: 22 MMOL/L (ref 23–29)
CREAT SERPL-MCNC: 0.9 MG/DL (ref 0.5–1.4)
CRP SERPL-MCNC: 3.5 MG/L (ref 0–8.2)
DIFFERENTIAL METHOD: ABNORMAL
EOSINOPHIL # BLD AUTO: 0.3 K/UL (ref 0–0.5)
EOSINOPHIL NFR BLD: 2.9 % (ref 0–8)
ERYTHROCYTE [DISTWIDTH] IN BLOOD BY AUTOMATED COUNT: 16.3 % (ref 11.5–14.5)
ERYTHROCYTE [SEDIMENTATION RATE] IN BLOOD BY WESTERGREN METHOD: 17 MM/HR (ref 0–36)
EST. GFR  (AFRICAN AMERICAN): >60 ML/MIN/1.73 M^2
EST. GFR  (NON AFRICAN AMERICAN): >60 ML/MIN/1.73 M^2
GLUCOSE SERPL-MCNC: 121 MG/DL (ref 70–110)
HCT VFR BLD AUTO: 42.8 % (ref 37–48.5)
HGB BLD-MCNC: 13.5 G/DL (ref 12–16)
IMM GRANULOCYTES # BLD AUTO: 0.06 K/UL (ref 0–0.04)
IMM GRANULOCYTES NFR BLD AUTO: 0.6 % (ref 0–0.5)
LYMPHOCYTES # BLD AUTO: 2.5 K/UL (ref 1–4.8)
LYMPHOCYTES NFR BLD: 25.8 % (ref 18–48)
MCH RBC QN AUTO: 27.2 PG (ref 27–31)
MCHC RBC AUTO-ENTMCNC: 31.5 G/DL (ref 32–36)
MCV RBC AUTO: 86 FL (ref 82–98)
MONOCYTES # BLD AUTO: 0.7 K/UL (ref 0.3–1)
MONOCYTES NFR BLD: 7.4 % (ref 4–15)
NEUTROPHILS # BLD AUTO: 6 K/UL (ref 1.8–7.7)
NEUTROPHILS NFR BLD: 62.3 % (ref 38–73)
NRBC BLD-RTO: 0 /100 WBC
PLATELET # BLD AUTO: 312 K/UL (ref 150–450)
PMV BLD AUTO: 11.4 FL (ref 9.2–12.9)
POTASSIUM SERPL-SCNC: 4.1 MMOL/L (ref 3.5–5.1)
PROT SERPL-MCNC: 6.7 G/DL (ref 6–8.4)
RBC # BLD AUTO: 4.96 M/UL (ref 4–5.4)
SODIUM SERPL-SCNC: 140 MMOL/L (ref 136–145)
WBC # BLD AUTO: 9.63 K/UL (ref 3.9–12.7)

## 2022-05-26 PROCEDURE — 99214 PR OFFICE/OUTPT VISIT, EST, LEVL IV, 30-39 MIN: ICD-10-PCS | Mod: S$GLB,,, | Performed by: INTERNAL MEDICINE

## 2022-05-26 PROCEDURE — 36415 COLL VENOUS BLD VENIPUNCTURE: CPT | Mod: PO | Performed by: INTERNAL MEDICINE

## 2022-05-26 PROCEDURE — 4010F ACE/ARB THERAPY RXD/TAKEN: CPT | Mod: CPTII,S$GLB,, | Performed by: INTERNAL MEDICINE

## 2022-05-26 PROCEDURE — 85652 RBC SED RATE AUTOMATED: CPT | Performed by: INTERNAL MEDICINE

## 2022-05-26 PROCEDURE — 1159F PR MEDICATION LIST DOCUMENTED IN MEDICAL RECORD: ICD-10-PCS | Mod: CPTII,S$GLB,, | Performed by: INTERNAL MEDICINE

## 2022-05-26 PROCEDURE — 1160F PR REVIEW ALL MEDS BY PRESCRIBER/CLIN PHARMACIST DOCUMENTED: ICD-10-PCS | Mod: CPTII,S$GLB,, | Performed by: INTERNAL MEDICINE

## 2022-05-26 PROCEDURE — 3079F PR MOST RECENT DIASTOLIC BLOOD PRESSURE 80-89 MM HG: ICD-10-PCS | Mod: CPTII,S$GLB,, | Performed by: INTERNAL MEDICINE

## 2022-05-26 PROCEDURE — 99999 PR PBB SHADOW E&M-EST. PATIENT-LVL V: CPT | Mod: PBBFAC,,, | Performed by: INTERNAL MEDICINE

## 2022-05-26 PROCEDURE — 3008F PR BODY MASS INDEX (BMI) DOCUMENTED: ICD-10-PCS | Mod: CPTII,S$GLB,, | Performed by: INTERNAL MEDICINE

## 2022-05-26 PROCEDURE — 3008F BODY MASS INDEX DOCD: CPT | Mod: CPTII,S$GLB,, | Performed by: INTERNAL MEDICINE

## 2022-05-26 PROCEDURE — 3077F PR MOST RECENT SYSTOLIC BLOOD PRESSURE >= 140 MM HG: ICD-10-PCS | Mod: CPTII,S$GLB,, | Performed by: INTERNAL MEDICINE

## 2022-05-26 PROCEDURE — 99214 OFFICE O/P EST MOD 30 MIN: CPT | Mod: S$GLB,,, | Performed by: INTERNAL MEDICINE

## 2022-05-26 PROCEDURE — 86140 C-REACTIVE PROTEIN: CPT | Performed by: INTERNAL MEDICINE

## 2022-05-26 PROCEDURE — 4010F PR ACE/ARB THEARPY RXD/TAKEN: ICD-10-PCS | Mod: CPTII,S$GLB,, | Performed by: INTERNAL MEDICINE

## 2022-05-26 PROCEDURE — 3077F SYST BP >= 140 MM HG: CPT | Mod: CPTII,S$GLB,, | Performed by: INTERNAL MEDICINE

## 2022-05-26 PROCEDURE — 1159F MED LIST DOCD IN RCRD: CPT | Mod: CPTII,S$GLB,, | Performed by: INTERNAL MEDICINE

## 2022-05-26 PROCEDURE — 1160F RVW MEDS BY RX/DR IN RCRD: CPT | Mod: CPTII,S$GLB,, | Performed by: INTERNAL MEDICINE

## 2022-05-26 PROCEDURE — 99999 PR PBB SHADOW E&M-EST. PATIENT-LVL V: ICD-10-PCS | Mod: PBBFAC,,, | Performed by: INTERNAL MEDICINE

## 2022-05-26 PROCEDURE — 80053 COMPREHEN METABOLIC PANEL: CPT | Performed by: INTERNAL MEDICINE

## 2022-05-26 PROCEDURE — 85025 COMPLETE CBC W/AUTO DIFF WBC: CPT | Performed by: INTERNAL MEDICINE

## 2022-05-26 PROCEDURE — 3079F DIAST BP 80-89 MM HG: CPT | Mod: CPTII,S$GLB,, | Performed by: INTERNAL MEDICINE

## 2022-05-26 NOTE — PATIENT INSTRUCTIONS
Typically we hold Cimzia and Methotrexate 2 weeks prior and about 2 weeks after provided there is no post operative complication. Hydroxychloroquine is not immunosuppressing so no hold except day of surgery and back once cleared for po intake.

## 2022-05-26 NOTE — PROGRESS NOTES
Answers for HPI/ROS submitted by the patient on 5/25/2022  fever: No  eye redness: No  mouth sores: No  headaches: Yes  shortness of breath: No  chest pain: No  trouble swallowing: No  diarrhea: No  constipation: No  unexpected weight change: No  genital sore: No  dysuria: No  During the last 3 days, have you had a skin rash?: No  Bruises or bleeds easily: No  cough: No        Subjective:          Chief Complaint: Annita Bettencourt is a 51 y.o. female who had concerns including Disease Management.    HPI:  Patient is a 51-year-old female here for consultation f/u sero+ high titer RA  Hands at MCP and wrist.   About 1 month ago with iritis with acute change in visual acuity and eye heaviness. Bilateral. Started on prednisilone gtt. X 2 weeks and immediate return upon dc. - f/u this visit since start Cimzia off gtts about 1 months and no return or issues.   [facility] (Dr. Goldstein). ? If this is related to RA. She completed HCQ scan. No prior hx of iritis.         Regarding joints + am stiffness this is the worst. and gelling with prolonged sitting. Non-restorative sleep .   She is currently on  Enbrel off for iritis.    MTX  25 mg once weekly   hydroxychloroquine 200 mgBID (since consultation as previously on once daily)   ibuprofen 800 mg t.i.d. p.r.n..    Tramadol 100mg TID PRN. - rare  Predniosne PRN has not used in nearly 1 year.   She has previously been on varying doses of prednisone PRN flares approx 1-2 monthly (50mg, 40mg, 30mg, 20mg and 10mg etc).     Previous medication:   Humira- congestion/HA .    Lost 20# off all red meat and sugar.     +AM stiffness <30min. Joint improve with activity.   The patient was diagnosed 5+  years ago    She has chronic dry eyes using systane. + dry mouth-  Patient currently has pain in her right shoulder right wrist of rates his pain at 2/10.    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 No MI  Still with sleep difficulty: Flexeril did not help.  Doxepin 10mg not helping. Restless with sleep latency, stays asleep once down.    Component      Latest Ref Rng & Units 8/7/2018   NIL      See text IU/mL 0.050   TB1 - Nil      See text IU/mL -0.030   TB2 - Nil      See text IU/mL -0.030   Mitogen - Nil      See text IU/mL 8.140   TB Gold Plus       Negative   Hepatitis B Surface Ag       Negative   Hep B C IgM       Negative   Hep A IgM       Negative   Hepatitis C Ab       Negative   Rheumatoid Factor      0.0 - 15.0 IU/mL 397.0 (H)   CCP Antibodies      <5.0 U/mL 160.5 (H)   Sed Rate      0 - 20 mm/Hr 9   CRP      0.0 - 8.2 mg/L 1.9       REVIEW OF SYSTEMS:    Review of Systems   Constitutional: Positive for malaise/fatigue. Negative for fever and weight loss.   HENT: Negative for sore throat.    Eyes: Negative for double vision, photophobia and redness.   Respiratory: Negative for cough, shortness of breath and wheezing.    Cardiovascular: Negative for chest pain, palpitations and orthopnea.   Gastrointestinal: Negative for abdominal pain, constipation and diarrhea.   Genitourinary: Negative for dysuria, hematuria and urgency.   Musculoskeletal: Positive for joint pain and myalgias. Negative for back pain.   Skin: Negative for rash.   Neurological: Negative for dizziness, tingling, focal weakness and headaches.   Endo/Heme/Allergies: Does not bruise/bleed easily.   Psychiatric/Behavioral: Negative for depression, hallucinations and suicidal ideas.               Objective:            Past Medical History:   Diagnosis Date    Acid reflux     Anxiety     Depression     Diabetes mellitus     Enlarged heart     Hypertension     Murmur     Rheumatoid arthritis      Family History   Problem Relation Age of Onset    Hypertension Father      Social History     Tobacco Use    Smoking status: Current Every Day Smoker     Packs/day: 0.25     Years: 25.00     Pack years: 6.25    Smokeless tobacco: Never Used   Substance Use Topics    Alcohol use: Yes      Comment: socially    Drug use: No         Current Outpatient Medications on File Prior to Visit   Medication Sig Dispense Refill    amLODIPine (NORVASC) 10 MG tablet Take 10 mg by mouth once daily.      atenolol (TENORMIN) 25 MG tablet Take 25 mg by mouth once daily.  5    certolizumab pegol (CIMZIA STARTER KIT) 400 mg/2 mL (200 mg/mL x 2) SyKt Inject 2 mL (400mg) into the skin on week 0, week 2, and week 4 2 each 0    certolizumab pegol (CIMZIA) 400 mg/2 mL (200 mg/mL x 2) SyKt Inject 1 mL (200 mg total) into the skin every 14 (fourteen) days. 2 each 11    esomeprazole magnesium 20 mg TbEC Take 20 mg by mouth once daily.      FLUoxetine 40 MG capsule Take 40 mg by mouth once daily.      fluticasone propionate (FLONASE) 50 mcg/actuation nasal spray USE 1 SPRAY EVERY DAY IN EACH NOSTRIL 16 g 3    folic acid (FOLVITE) 1 MG tablet Take 1 tablet (1,000 mcg total) by mouth once daily. 90 tablet 3    hydrOXYchloroQUINE (PLAQUENIL) 200 mg tablet TAKE 1 TABLET BY MOUTH TWICE DAILY 60 tablet 3    ibuprofen (ADVIL,MOTRIN) 800 MG tablet TAKE 1 TABLET BY MOUTH 2 OR 3 TIMES A DAY AS NEEDED 90 tablet 2    losartan (COZAAR) 50 MG tablet Take 50 mg by mouth once daily.      metformin (GLUCOPHAGE) 500 MG tablet Take 500 mg by mouth once daily.      methotrexate 2.5 MG Tab TAKE 10 TABLETS BY MOUTH EVERY 7 DAYS 40 tablet 3    nystatin (MYCOSTATIN) ointment 1 application 2 (two) times daily.      ondansetron (ZOFRAN) 4 MG tablet 4 mg.      pravastatin (PRAVACHOL) 10 MG tablet Take 10 mg by mouth every evening.  5    prednisoLONE acetate (PRED FORTE) 1 % DrpS Place into both eyes.      predniSONE (DELTASONE) 5 MG tablet 20mg x 2 days, then 15mg x 2 days, then 10mg x 2 days then stop; may repeat x 1 40 tablet 2    traMADoL (ULTRAM) 50 mg tablet TAKE 1 OR 2 TABLETS BY MOUTH EVERY 8 HOURS AS NEEDED FOR PAIN 180 tablet 2    traZODone (DESYREL) 50 MG tablet TAKE 1 OR 2 TABLETS BY MOUTH EVERY EVENING 60 tablet 11     turmeric/turmeric ext/pepr ext (TURMERIC-TURMERIC EXT-PEPPER) 500-3 mg Cap Take 1 capsule by mouth every evening.      etanercept (ENBREL SURECLICK) 50 mg/mL (1 mL) Inject 1 mL (50 mg total) into the skin once a week. 4 mL 11     No current facility-administered medications on file prior to visit.       Vitals:    05/26/22 1339   BP: (!) 140/84   Pulse: 103       Physical Exam:    Physical Exam  Constitutional:       Appearance: She is well-developed.   Eyes:      General: Lids are normal.   Neurological:      Mental Status: She is oriented to person, place, and time.   Psychiatric:         Behavior: Behavior normal.         Thought Content: Thought content normal.               Assessment:       Encounter Diagnoses   Name Primary?    Rheumatoid arthritis involving both hands with positive rheumatoid factor Yes    Immunosuppression           Plan:        Rheumatoid arthritis involving both hands with positive rheumatoid factor  -     CBC Auto Differential; Standing; Expected date: 05/26/2022  -     Comprehensive Metabolic Panel; Standing; Expected date: 05/26/2022  -     Sedimentation rate; Standing; Expected date: 05/26/2022  -     C-Reactive Protein; Standing; Expected date: 05/26/2022  -     CBC Auto Differential; Standing; Expected date: 05/26/2022  -     Comprehensive Metabolic Panel; Standing; Expected date: 05/26/2022  -     Sedimentation rate; Standing; Expected date: 05/26/2022  -     C-Reactive Protein; Standing; Expected date: 05/26/2022    Immunosuppression  -     CBC Auto Differential; Standing; Expected date: 05/26/2022  -     Comprehensive Metabolic Panel; Standing; Expected date: 05/26/2022  -     Sedimentation rate; Standing; Expected date: 05/26/2022  -     C-Reactive Protein; Standing; Expected date: 05/26/2022         Patient is a 51-year-old female with rheumatoid arthritis     Ok for prednisone if flares as you have done in past.     Failed Humira with URI/HA    MTX 10 tabs weekly    HCQ  200mg BID-UTD 2019    No URI s/sx with Enbrel. But persistent Iritis for over 6 months  Last visit switched to Cimzia. 3/14/22      Patient is aware of the risks benefits side effects and monitoring requirements of biologic therapy including but not limited to disseminated tuberculosis recurrent serious infections suppression of the immune system, injection site reactions.  F/u 4 months.     Follow up in about 4 months (around 9/26/2022).      30min consultation with greater than 50% spent in counseling, chart review and coordination of care. All questions answered.

## 2022-05-31 DIAGNOSIS — G47.00 INSOMNIA, UNSPECIFIED TYPE: ICD-10-CM

## 2022-05-31 RX ORDER — TRAZODONE HYDROCHLORIDE 50 MG/1
TABLET ORAL
Qty: 60 TABLET | Refills: 11 | Status: CANCELLED | OUTPATIENT
Start: 2022-05-31

## 2022-06-21 ENCOUNTER — SPECIALTY PHARMACY (OUTPATIENT)
Dept: PHARMACY | Facility: CLINIC | Age: 52
End: 2022-06-21
Payer: COMMERCIAL

## 2022-06-21 DIAGNOSIS — M06.9 RHEUMATOID ARTHRITIS, INVOLVING UNSPECIFIED SITE, UNSPECIFIED WHETHER RHEUMATOID FACTOR PRESENT: Primary | ICD-10-CM

## 2022-06-21 NOTE — TELEPHONE ENCOUNTER
Pt recently tested positive for covid 6/16 and flu- was on Tamiflu but completed Sunday. She reports most symptoms resolved but still experiencing upper respiratory symptoms. She states she is due for her dose next Cimzia injection Thursday 7/30/22. I had advised her to hold off on injection until at least 2 weeks from positive result, therefore she may resume next Thursday.   Messaged provider. Will follow-up with pt next week to assess appropriateness. Removed RF, following ivent.

## 2022-06-27 NOTE — TELEPHONE ENCOUNTER
Pt reports resolution of flu symptoms. Advised she may resume cimzia every other week injections as planned on Thursday. Pt voiced understanding no further questions or concerns.

## 2022-06-27 NOTE — TELEPHONE ENCOUNTER
Specialty Pharmacy - Refill Coordination  Specialty Pharmacy - Clinical Intervention    Specialty Medication Orders Linked to Encounter    Flowsheet Row Most Recent Value   Medication #1 certolizumab pegol (CIMZIA STARTER KIT) 400 mg/2 mL (200 mg/mL x 2) SyKt (Order#[id number], Rx#[id number])      cimzia dose held appropriately. Addressed in ivent.     Refill Questions - Documented Responses    Flowsheet Row Most Recent Value   Patient Availability and HIPAA Verification    Does patient want to proceed with activity? Yes   HIPAA/medical authority confirmed? Yes   Relationship to patient of person spoken to? Self   Refill Screening Questions    Changes to allergies? No   Changes to medications? Yes  [tamiflu]   New conditions since last clinic visit? No   Unplanned office visit, urgent care, ED, or hospital admission in the last 4 weeks? Yes  [urgent care,  covid and flu diagnosis]   How does patient/caregiver feel medication is working? Good   Financial problems or insurance changes? No   How many doses of your specialty medications were missed in the last 4 weeks? 0   Would patient like to speak to a pharmacist? No   When does the patient need to receive the medication? 06/30/22   Refill Delivery Questions    How will the patient receive the medication? Delivery Jocelyn   When does the patient need to receive the medication? 06/30/22   Shipping Address Home   Address in [facility] confirmed and updated if neccessary? Yes   Expected Copay ($) 0   Is the patient able to afford the medication copay? Yes   Payment Method zero copay   Days supply of Refill 28   Supplies needed? No supplies needed   Refill activity completed? Yes   Refill activity plan Refill scheduled   Shipment/Pickup Date: 06/30/22          Current Outpatient Medications   Medication Sig    amLODIPine (NORVASC) 10 MG tablet Take 10 mg by mouth once daily.    atenolol (TENORMIN) 25 MG tablet Take 25 mg by mouth once daily.    certolizumab pegol  (CIMZIA STARTER KIT) 400 mg/2 mL (200 mg/mL x 2) SyKt Inject 2 mL (400mg) into the skin on week 0, week 2, and week 4 (Patient not taking: Reported on 5/26/2022)    certolizumab pegol (CIMZIA) 400 mg/2 mL (200 mg/mL x 2) SyKt Inject 1 mL (200 mg total) into the skin every 14 (fourteen) days.    esomeprazole magnesium 20 mg TbEC Take 20 mg by mouth once daily.    etanercept (ENBREL SURECLICK) 50 mg/mL (1 mL) Inject 1 mL (50 mg total) into the skin once a week.    FLUoxetine 40 MG capsule Take 40 mg by mouth once daily.    fluticasone propionate (FLONASE) 50 mcg/actuation nasal spray USE 1 SPRAY EVERY DAY IN EACH NOSTRIL    folic acid (FOLVITE) 1 MG tablet Take 1 tablet (1,000 mcg total) by mouth once daily.    hydrOXYchloroQUINE (PLAQUENIL) 200 mg tablet TAKE 1 TABLET BY MOUTH TWICE DAILY    ibuprofen (ADVIL,MOTRIN) 800 MG tablet TAKE 1 TABLET BY MOUTH 2 OR 3 TIMES A DAY AS NEEDED    losartan (COZAAR) 50 MG tablet Take 50 mg by mouth once daily.    metformin (GLUCOPHAGE) 500 MG tablet Take 500 mg by mouth once daily.    methotrexate 2.5 MG Tab TAKE 10 TABLETS BY MOUTH EVERY 7 DAYS    nystatin (MYCOSTATIN) ointment 1 application 2 (two) times daily.    ondansetron (ZOFRAN) 4 MG tablet 4 mg.    pravastatin (PRAVACHOL) 10 MG tablet Take 10 mg by mouth every evening.    prednisoLONE acetate (PRED FORTE) 1 % DrpS Place into both eyes.    predniSONE (DELTASONE) 5 MG tablet 20mg x 2 days, then 15mg x 2 days, then 10mg x 2 days then stop; may repeat x 1    traMADoL (ULTRAM) 50 mg tablet TAKE 1 OR 2 TABLETS BY MOUTH EVERY 8 HOURS AS NEEDED FOR PAIN    traZODone (DESYREL) 50 MG tablet TAKE 1 OR 2 TABLETS BY MOUTH EVERY EVENING    turmeric/turmeric ext/pepr ext (TURMERIC-TURMERIC EXT-PEPPER) 500-3 mg Cap Take 1 capsule by mouth every evening.   Last reviewed on 5/26/2022  2:09 PM by Agnieszka Schwab DO    Review of patient's allergies indicates:   Allergen Reactions    Sulfa (sulfonamide antibiotics) Rash     Last reviewed on  5/26/2022 2:09 PM by Agnieszka Schwab    Interventions added this encounter   Closed: [facility] Patient Intervention - Drug safety: certolizumab pegol (CIMZIA) 400 mg/2 mL (200 mg/mL x 2) SyKt     Tasks added this encounter   7/21/2022 - Refill Call (Auto Added)   Tasks due within next 3 months   No tasks due.     Anita Francis, PharmD  [facility]  [address] [place] [address]  Phone: [phone]  Fax: [phone]

## 2022-07-21 ENCOUNTER — PATIENT MESSAGE (OUTPATIENT)
Dept: PHARMACY | Facility: CLINIC | Age: 52
End: 2022-07-21
Payer: COMMERCIAL

## 2022-07-26 ENCOUNTER — PATIENT MESSAGE (OUTPATIENT)
Dept: PHARMACY | Facility: CLINIC | Age: 52
End: 2022-07-26
Payer: COMMERCIAL

## 2022-07-28 ENCOUNTER — SPECIALTY PHARMACY (OUTPATIENT)
Dept: PHARMACY | Facility: CLINIC | Age: 52
End: 2022-07-28
Payer: COMMERCIAL

## 2022-07-28 NOTE — TELEPHONE ENCOUNTER
Specialty Pharmacy - Refill Coordination    Specialty Medication Orders Linked to Encounter    Flowsheet Row Most Recent Value   Medication #1 certolizumab pegol (CIMZIA) 400 mg/2 mL (200 mg/mL x 2) SyKt (Order#[id number], Rx#[id number])          Refill Questions - Documented Responses    Flowsheet Row Most Recent Value   Patient Availability and HIPAA Verification    Does patient want to proceed with activity? Yes   HIPAA/medical authority confirmed? Yes   Relationship to patient of person spoken to? Self   Refill Screening Questions    Changes to allergies? No   Changes to medications? No   New conditions since last clinic visit? No   Unplanned office visit, urgent care, ED, or hospital admission in the last 4 weeks? No   How does patient/caregiver feel medication is working? Good   Financial problems or insurance changes? No   How many doses of your specialty medications were missed in the last 4 weeks? 0   Would patient like to speak to a pharmacist? No   When does the patient need to receive the medication? 07/29/22   Refill Delivery Questions    How will the patient receive the medication? Delivery Jocelyn   When does the patient need to receive the medication? 07/29/22   Shipping Address Home   Address in [facility] confirmed and updated if neccessary? Yes   Expected Copay ($) 0   Is the patient able to afford the medication copay? Yes   Payment Method zero copay   Days supply of Refill 28   Supplies needed? Alcohol Swabs   Refill activity completed? Yes   Refill activity plan Refill scheduled   Shipment/Pickup Date: 07/29/22          Current Outpatient Medications   Medication Sig    amLODIPine (NORVASC) 10 MG tablet Take 10 mg by mouth once daily.    atenolol (TENORMIN) 25 MG tablet Take 25 mg by mouth once daily.    certolizumab pegol (CIMZIA STARTER KIT) 400 mg/2 mL (200 mg/mL x 2) SyKt Inject 2 mL (400mg) into the skin on week 0, week 2, and week 4 (Patient not taking: Reported on 5/26/2022)     certolizumab pegol (CIMZIA) 400 mg/2 mL (200 mg/mL x 2) SyKt Inject 1 mL (200 mg total) into the skin every 14 (fourteen) days.    esomeprazole magnesium 20 mg TbEC Take 20 mg by mouth once daily.    etanercept (ENBREL SURECLICK) 50 mg/mL (1 mL) Inject 1 mL (50 mg total) into the skin once a week.    FLUoxetine 40 MG capsule Take 40 mg by mouth once daily.    fluticasone propionate (FLONASE) 50 mcg/actuation nasal spray USE 1 SPRAY EVERY DAY IN EACH NOSTRIL    folic acid (FOLVITE) 1 MG tablet Take 1 tablet (1,000 mcg total) by mouth once daily.    hydrOXYchloroQUINE (PLAQUENIL) 200 mg tablet TAKE 1 TABLET BY MOUTH TWICE DAILY    ibuprofen (ADVIL,MOTRIN) 800 MG tablet TAKE 1 TABLET BY MOUTH 2 OR 3 TIMES A DAY AS NEEDED    losartan (COZAAR) 50 MG tablet Take 50 mg by mouth once daily.    metformin (GLUCOPHAGE) 500 MG tablet Take 500 mg by mouth once daily.    methotrexate 2.5 MG Tab TAKE 10 TABLETS BY MOUTH EVERY 7 DAYS    nystatin (MYCOSTATIN) ointment 1 application 2 (two) times daily.    ondansetron (ZOFRAN) 4 MG tablet 4 mg.    pravastatin (PRAVACHOL) 10 MG tablet Take 10 mg by mouth every evening.    prednisoLONE acetate (PRED FORTE) 1 % DrpS Place into both eyes.    predniSONE (DELTASONE) 5 MG tablet 20mg x 2 days, then 15mg x 2 days, then 10mg x 2 days then stop; may repeat x 1    traMADoL (ULTRAM) 50 mg tablet TAKE 1 OR 2 TABLETS BY MOUTH EVERY 8 HOURS AS NEEDED FOR PAIN    traZODone (DESYREL) 50 MG tablet TAKE 1 OR 2 TABLETS BY MOUTH EVERY EVENING    turmeric/turmeric ext/pepr ext (TURMERIC-TURMERIC EXT-PEPPER) 500-3 mg Cap Take 1 capsule by mouth every evening.   Last reviewed on 5/26/2022  2:09 PM by Agnieszka Schwab,     Review of patient's allergies indicates:   Allergen Reactions    Sulfa (sulfonamide antibiotics) Rash    Last reviewed on  5/26/2022 2:09 PM by Agnieszka Schwab      Tasks added this encounter   8/21/2022 - Refill Call (Auto Added)   Tasks due within next 3 months    No tasks due.     Marisol Schreiber, PharmD  [facility]  [address]  [facility] [address]  Phone: [phone]  Fax: [phone]

## 2022-08-22 ENCOUNTER — PATIENT MESSAGE (OUTPATIENT)
Dept: PHARMACY | Facility: CLINIC | Age: 52
End: 2022-08-22
Payer: COMMERCIAL

## 2022-08-25 ENCOUNTER — SPECIALTY PHARMACY (OUTPATIENT)
Dept: PHARMACY | Facility: CLINIC | Age: 52
End: 2022-08-25
Payer: COMMERCIAL

## 2022-08-25 DIAGNOSIS — M06.9 RHEUMATOID ARTHRITIS, INVOLVING UNSPECIFIED SITE, UNSPECIFIED WHETHER RHEUMATOID FACTOR PRESENT: Primary | ICD-10-CM

## 2022-08-25 NOTE — TELEPHONE ENCOUNTER
Called and spoke with patient regarding Cimzia refill. New PA required. Patient due to inject 9/1. Routing to [facility].

## 2022-08-29 NOTE — TELEPHONE ENCOUNTER
Cimzia for RA status check -prior authorization is still pending and should know by 8/31 per rep Kera P

## 2022-09-06 NOTE — TELEPHONE ENCOUNTER
Specialty Pharmacy - Refill Coordination  Specialty Pharmacy - Medication/Referral Authorization    Missed dose on 9/1. Consulted with [facility] team: patient instructed to take her dose as soon as received, and adjust her dosing calendar for her next dose      Specialty Medication Orders Linked to Encounter      Flowsheet Row Most Recent Value   Medication #1 certolizumab pegol (CIMZIA) 400 mg/2 mL (200 mg/mL x 2) SyKt (Order#[id number], Rx#[id number])            Refill Questions - Documented Responses      Flowsheet Row Most Recent Value   Patient Availability and HIPAA Verification    Does patient want to proceed with activity? Yes   HIPAA/medical authority confirmed? Yes   Relationship to patient of person spoken to? Self   Refill Screening Questions    Changes to allergies? No   Changes to medications? No   New conditions since last clinic visit? No   Unplanned office visit, urgent care, ED, or hospital admission in the last 4 weeks? No   How does patient/caregiver feel medication is working? Good   Financial problems or insurance changes? No   How many doses of your specialty medications were missed in the last 4 weeks? 1  [Missed dose on 9/1. Consulted with [facility] team: patient instructed to take her dose as soon as received, and adjust her dosing calendar for her next dose]   Would patient like to speak to a pharmacist? No   When does the patient need to receive the medication? 09/08/22   Refill Delivery Questions    How will the patient receive the medication? Delivery Jocelyn   When does the patient need to receive the medication? 09/08/22   Shipping Address Home   Address in [facility] confirmed and updated if neccessary? Yes   Expected Copay ($) 0   Is the patient able to afford the medication copay? Yes   Payment Method zero copay   Days supply of Refill 28   Supplies needed? No supplies needed   Refill activity completed? Yes   Refill activity plan Refill scheduled   Shipment/Pickup Date: 09/07/22             Current Outpatient Medications   Medication Sig    amLODIPine (NORVASC) 10 MG tablet Take 10 mg by mouth once daily.    atenolol (TENORMIN) 25 MG tablet Take 25 mg by mouth once daily.    certolizumab pegol (CIMZIA STARTER KIT) 400 mg/2 mL (200 mg/mL x 2) SyKt Inject 2 mL (400mg) into the skin on week 0, week 2, and week 4 (Patient not taking: Reported on 5/26/2022)    certolizumab pegol (CIMZIA) 400 mg/2 mL (200 mg/mL x 2) SyKt Inject 1 mL (200 mg total) into the skin every 14 (fourteen) days.    esomeprazole magnesium 20 mg TbEC Take 20 mg by mouth once daily.    FLUoxetine 40 MG capsule Take 40 mg by mouth once daily.    fluticasone propionate (FLONASE) 50 mcg/actuation nasal spray USE 1 SPRAY EVERY DAY IN EACH NOSTRIL    folic acid (FOLVITE) 1 MG tablet Take 1 tablet (1,000 mcg total) by mouth once daily.    hydrOXYchloroQUINE (PLAQUENIL) 200 mg tablet TAKE 1 TABLET BY MOUTH TWICE DAILY    ibuprofen (ADVIL,MOTRIN) 800 MG tablet TAKE 1 TABLET BY MOUTH 2 OR 3 TIMES A DAY AS NEEDED    losartan (COZAAR) 50 MG tablet Take 50 mg by mouth once daily.    metformin (GLUCOPHAGE) 500 MG tablet Take 500 mg by mouth once daily.    methotrexate 2.5 MG Tab TAKE 10 TABLETS BY MOUTH EVERY 7 DAYS    nystatin (MYCOSTATIN) ointment 1 application 2 (two) times daily.    ondansetron (ZOFRAN) 4 MG tablet 4 mg.    pravastatin (PRAVACHOL) 10 MG tablet Take 10 mg by mouth every evening.    prednisoLONE acetate (PRED FORTE) 1 % DrpS Place into both eyes.    predniSONE (DELTASONE) 5 MG tablet 20mg x 2 days, then 15mg x 2 days, then 10mg x 2 days then stop; may repeat x 1    traMADoL (ULTRAM) 50 mg tablet TAKE 1 OR 2 TABLETS BY MOUTH EVERY 8 HOURS AS NEEDED FOR PAIN    traZODone (DESYREL) 50 MG tablet TAKE 1 OR 2 TABLETS BY MOUTH EVERY EVENING    turmeric/turmeric ext/pepr ext (TURMERIC-TURMERIC EXT-PEPPER) 500-3 mg Cap Take 1 capsule by mouth every evening.   Last reviewed on 5/26/2022  2:09 PM by Agnieszka Schwab DO    Review of  patient's allergies indicates:   Allergen Reactions    Sulfa (sulfonamide antibiotics) Rash    Last reviewed on  5/26/2022 2:09 PM by Agnieszka Schwab      Tasks added this encounter   No tasks added.   Tasks due within next 3 months   8/21/2022 - Refill Call (Auto Added)     SHAWNA WASHBURN, PharmD  [facility]  [address]  [facility] [address]  Phone: [phone]  Fax: [phone]

## 2022-09-26 ENCOUNTER — LAB VISIT (OUTPATIENT)
Dept: FAMILY MEDICINE | Facility: CLINIC | Age: 52
End: 2022-09-26
Payer: COMMERCIAL

## 2022-09-26 DIAGNOSIS — M05.741 RHEUMATOID ARTHRITIS INVOLVING BOTH HANDS WITH POSITIVE RHEUMATOID FACTOR: ICD-10-CM

## 2022-09-26 DIAGNOSIS — M05.742 RHEUMATOID ARTHRITIS INVOLVING BOTH HANDS WITH POSITIVE RHEUMATOID FACTOR: ICD-10-CM

## 2022-09-26 LAB
ALBUMIN SERPL BCP-MCNC: 4.1 G/DL (ref 3.5–5.2)
ALP SERPL-CCNC: 63 U/L (ref 55–135)
ALT SERPL W/O P-5'-P-CCNC: 31 U/L (ref 10–44)
ANION GAP SERPL CALC-SCNC: 12 MMOL/L (ref 8–16)
AST SERPL-CCNC: 16 U/L (ref 10–40)
BASOPHILS # BLD AUTO: 0.06 K/UL (ref 0–0.2)
BASOPHILS NFR BLD: 0.8 % (ref 0–1.9)
BILIRUB SERPL-MCNC: 0.4 MG/DL (ref 0.1–1)
BUN SERPL-MCNC: 10 MG/DL (ref 6–20)
CALCIUM SERPL-MCNC: 10.2 MG/DL (ref 8.7–10.5)
CHLORIDE SERPL-SCNC: 104 MMOL/L (ref 95–110)
CO2 SERPL-SCNC: 24 MMOL/L (ref 23–29)
CREAT SERPL-MCNC: 0.8 MG/DL (ref 0.5–1.4)
CRP SERPL-MCNC: 3 MG/L (ref 0–8.2)
DIFFERENTIAL METHOD: ABNORMAL
EOSINOPHIL # BLD AUTO: 0.3 K/UL (ref 0–0.5)
EOSINOPHIL NFR BLD: 3.6 % (ref 0–8)
ERYTHROCYTE [DISTWIDTH] IN BLOOD BY AUTOMATED COUNT: 14.6 % (ref 11.5–14.5)
ERYTHROCYTE [SEDIMENTATION RATE] IN BLOOD BY PHOTOMETRIC METHOD: 17 MM/HR (ref 0–36)
EST. GFR  (NO RACE VARIABLE): >60 ML/MIN/1.73 M^2
GLUCOSE SERPL-MCNC: 105 MG/DL (ref 70–110)
HCT VFR BLD AUTO: 42.7 % (ref 37–48.5)
HGB BLD-MCNC: 13.7 G/DL (ref 12–16)
IMM GRANULOCYTES # BLD AUTO: 0.03 K/UL (ref 0–0.04)
IMM GRANULOCYTES NFR BLD AUTO: 0.4 % (ref 0–0.5)
LYMPHOCYTES # BLD AUTO: 2.3 K/UL (ref 1–4.8)
LYMPHOCYTES NFR BLD: 32.3 % (ref 18–48)
MCH RBC QN AUTO: 28.2 PG (ref 27–31)
MCHC RBC AUTO-ENTMCNC: 32.1 G/DL (ref 32–36)
MCV RBC AUTO: 88 FL (ref 82–98)
MONOCYTES # BLD AUTO: 0.5 K/UL (ref 0.3–1)
MONOCYTES NFR BLD: 6.6 % (ref 4–15)
NEUTROPHILS # BLD AUTO: 4.1 K/UL (ref 1.8–7.7)
NEUTROPHILS NFR BLD: 56.3 % (ref 38–73)
NRBC BLD-RTO: 0 /100 WBC
PLATELET # BLD AUTO: 222 K/UL (ref 150–450)
PMV BLD AUTO: 11.6 FL (ref 9.2–12.9)
POTASSIUM SERPL-SCNC: 4.2 MMOL/L (ref 3.5–5.1)
PROT SERPL-MCNC: 6.7 G/DL (ref 6–8.4)
RBC # BLD AUTO: 4.86 M/UL (ref 4–5.4)
SODIUM SERPL-SCNC: 140 MMOL/L (ref 136–145)
WBC # BLD AUTO: 7.24 K/UL (ref 3.9–12.7)

## 2022-09-26 PROCEDURE — 86140 C-REACTIVE PROTEIN: CPT | Performed by: INTERNAL MEDICINE

## 2022-09-26 PROCEDURE — 85652 RBC SED RATE AUTOMATED: CPT | Performed by: INTERNAL MEDICINE

## 2022-09-26 PROCEDURE — 85025 COMPLETE CBC W/AUTO DIFF WBC: CPT | Performed by: INTERNAL MEDICINE

## 2022-09-26 PROCEDURE — 36415 PR COLLECTION VENOUS BLOOD,VENIPUNCTURE: ICD-10-PCS | Mod: S$GLB,,, | Performed by: INTERNAL MEDICINE

## 2022-09-26 PROCEDURE — 36415 COLL VENOUS BLD VENIPUNCTURE: CPT | Mod: S$GLB,,, | Performed by: INTERNAL MEDICINE

## 2022-09-26 PROCEDURE — 80053 COMPREHEN METABOLIC PANEL: CPT | Performed by: INTERNAL MEDICINE

## 2022-09-29 ENCOUNTER — SPECIALTY PHARMACY (OUTPATIENT)
Dept: PHARMACY | Facility: CLINIC | Age: 52
End: 2022-09-29
Payer: COMMERCIAL

## 2022-09-29 NOTE — TELEPHONE ENCOUNTER
Specialty Pharmacy - Refill Coordination    Specialty Medication Orders Linked to Encounter      Flowsheet Row Most Recent Value   Medication #1 certolizumab pegol (CIMZIA) 400 mg/2 mL (200 mg/mL x 2) SyKt (Order#[id number], Rx#[id number])            Refill Questions - Documented Responses      Flowsheet Row Most Recent Value   Patient Availability and HIPAA Verification    Does patient want to proceed with activity? Yes   HIPAA/medical authority confirmed? Yes   Relationship to patient of person spoken to? Self   Refill Screening Questions    Changes to allergies? No   Changes to medications? No   New conditions since last clinic visit? No   Unplanned office visit, urgent care, ED, or hospital admission in the last 4 weeks? No   How does patient/caregiver feel medication is working? Good   Financial problems or insurance changes? No   How many doses of your specialty medications were missed in the last 4 weeks? 0   Would patient like to speak to a pharmacist? No   When does the patient need to receive the medication? 10/06/22   Refill Delivery Questions    How will the patient receive the medication? MEDRx   When does the patient need to receive the medication? 10/06/22   Shipping Address Home   Address in [facility] confirmed and updated if neccessary? Yes   Expected Copay ($) 0   Is the patient able to afford the medication copay? Yes   Payment Method zero copay   Days supply of Refill 28   Supplies needed? No supplies needed   Refill activity completed? Yes   Refill activity plan Refill scheduled   Shipment/Pickup Date: 10/03/22            Current Outpatient Medications   Medication Sig    amLODIPine (NORVASC) 10 MG tablet Take 10 mg by mouth once daily.    atenolol (TENORMIN) 25 MG tablet Take 25 mg by mouth once daily.    certolizumab pegol (CIMZIA STARTER KIT) 400 mg/2 mL (200 mg/mL x 2) SyKt Inject 2 mL (400mg) into the skin on week 0, week 2, and week 4 (Patient not taking: Reported on 5/26/2022)     certolizumab pegol (CIMZIA) 400 mg/2 mL (200 mg/mL x 2) SyKt Inject 1 mL (200 mg total) into the skin every 14 (fourteen) days.    esomeprazole magnesium 20 mg TbEC Take 20 mg by mouth once daily.    FLUoxetine 40 MG capsule Take 40 mg by mouth once daily.    fluticasone propionate (FLONASE) 50 mcg/actuation nasal spray USE 1 SPRAY EVERY DAY IN EACH NOSTRIL    folic acid (FOLVITE) 1 MG tablet Take 1 tablet (1,000 mcg total) by mouth once daily.    hydrOXYchloroQUINE (PLAQUENIL) 200 mg tablet TAKE 1 TABLET BY MOUTH TWICE DAILY    ibuprofen (ADVIL,MOTRIN) 800 MG tablet TAKE 1 TABLET BY MOUTH 2 OR 3 TIMES A DAY AS NEEDED    losartan (COZAAR) 50 MG tablet Take 50 mg by mouth once daily.    metformin (GLUCOPHAGE) 500 MG tablet Take 500 mg by mouth once daily.    methotrexate 2.5 MG Tab TAKE 10 TABLETS BY MOUTH EVERY 7 DAYS    nystatin (MYCOSTATIN) ointment 1 application 2 (two) times daily.    ondansetron (ZOFRAN) 4 MG tablet 4 mg.    pravastatin (PRAVACHOL) 10 MG tablet Take 10 mg by mouth every evening.    prednisoLONE acetate (PRED FORTE) 1 % DrpS Place into both eyes.    predniSONE (DELTASONE) 5 MG tablet 20mg x 2 days, then 15mg x 2 days, then 10mg x 2 days then stop; may repeat x 1    traMADoL (ULTRAM) 50 mg tablet TAKE 1 OR 2 TABLETS BY MOUTH EVERY 8 HOURS AS NEEDED FOR PAIN    traZODone (DESYREL) 50 MG tablet TAKE 1 OR 2 TABLETS BY MOUTH EVERY EVENING    turmeric/turmeric ext/pepr ext (TURMERIC-TURMERIC EXT-PEPPER) 500-3 mg Cap Take 1 capsule by mouth every evening.   Last reviewed on 5/26/2022  2:09 PM by Agnieszka Schwab, DO    Review of patient's allergies indicates:   Allergen Reactions    Sulfa (sulfonamide antibiotics) Rash    Last reviewed on  5/26/2022 2:09 PM by Agnieszka Schwab      Tasks added this encounter   10/27/2022 - Refill Call (Auto Added)   Tasks due within next 3 months   12/14/2022 - Clinical - Follow Up Assesement (Annual)     [facility]  [address] [place] [address]  [address]  [facility]  [facility] [address]  Phone: [phone]  Fax: [phone]

## 2022-10-27 ENCOUNTER — SPECIALTY PHARMACY (OUTPATIENT)
Dept: PHARMACY | Facility: CLINIC | Age: 52
End: 2022-10-27
Payer: COMMERCIAL

## 2022-10-27 NOTE — TELEPHONE ENCOUNTER
Specialty Pharmacy - Refill Coordination    Specialty Medication Orders Linked to Encounter      Flowsheet Row Most Recent Value   Medication #1 certolizumab pegol (CIMZIA) 400 mg/2 mL (200 mg/mL x 2) SyKt (Order#[id number], Rx#[id number])            Refill Questions - Documented Responses      Flowsheet Row Most Recent Value   Patient Availability and HIPAA Verification    Does patient want to proceed with activity? Yes   HIPAA/medical authority confirmed? Yes   Relationship to patient of person spoken to? Self   Refill Screening Questions    Changes to allergies? No   Changes to medications? No   New conditions since last clinic visit? No   Unplanned office visit, urgent care, ED, or hospital admission in the last 4 weeks? No   How does patient/caregiver feel medication is working? Good   Financial problems or insurance changes? No   How many doses of your specialty medications were missed in the last 4 weeks? 0   Would patient like to speak to a pharmacist? No   When does the patient need to receive the medication? 11/03/22   Refill Delivery Questions    How will the patient receive the medication? MEDRx   When does the patient need to receive the medication? 11/03/22   Shipping Address Home   Address in [facility] confirmed and updated if neccessary? Yes   Expected Copay ($) 0   Is the patient able to afford the medication copay? Yes   Payment Method zero copay   Days supply of Refill 28   Supplies needed? No supplies needed   Refill activity completed? Yes   Refill activity plan Refill scheduled   Shipment/Pickup Date: 11/01/22            Current Outpatient Medications   Medication Sig    amLODIPine (NORVASC) 10 MG tablet Take 10 mg by mouth once daily.    atenolol (TENORMIN) 25 MG tablet Take 25 mg by mouth once daily.    certolizumab pegol (CIMZIA STARTER KIT) 400 mg/2 mL (200 mg/mL x 2) SyKt Inject 2 mL (400mg) into the skin on week 0, week 2, and week 4 (Patient not taking: Reported on 5/26/2022)     certolizumab pegol (CIMZIA) 400 mg/2 mL (200 mg/mL x 2) SyKt Inject 1 mL (200 mg total) into the skin every 14 (fourteen) days.    esomeprazole magnesium 20 mg TbEC Take 20 mg by mouth once daily.    FLUoxetine 40 MG capsule Take 40 mg by mouth once daily.    fluticasone propionate (FLONASE) 50 mcg/actuation nasal spray USE 1 SPRAY EVERY DAY IN EACH NOSTRIL    folic acid (FOLVITE) 1 MG tablet Take 1 tablet (1,000 mcg total) by mouth once daily.    hydrOXYchloroQUINE (PLAQUENIL) 200 mg tablet TAKE 1 TABLET BY MOUTH TWICE DAILY    ibuprofen (ADVIL,MOTRIN) 800 MG tablet TAKE 1 TABLET BY MOUTH 2 OR 3 TIMES A DAY AS NEEDED    losartan (COZAAR) 50 MG tablet Take 50 mg by mouth once daily.    metformin (GLUCOPHAGE) 500 MG tablet Take 500 mg by mouth once daily.    methotrexate 2.5 MG Tab TAKE 10 TABLETS BY MOUTH EVERY 7 DAYS    nystatin (MYCOSTATIN) ointment 1 application 2 (two) times daily.    ondansetron (ZOFRAN) 4 MG tablet 4 mg.    pravastatin (PRAVACHOL) 10 MG tablet Take 10 mg by mouth every evening.    prednisoLONE acetate (PRED FORTE) 1 % DrpS Place into both eyes.    predniSONE (DELTASONE) 5 MG tablet 20mg x 2 days, then 15mg x 2 days, then 10mg x 2 days then stop; may repeat x 1    traMADoL (ULTRAM) 50 mg tablet TAKE 1 OR 2 TABLETS BY MOUTH EVERY 8 HOURS AS NEEDED FOR PAIN    traZODone (DESYREL) 50 MG tablet TAKE 1 OR 2 TABLETS BY MOUTH EVERY EVENING    turmeric/turmeric ext/pepr ext (TURMERIC-TURMERIC EXT-PEPPER) 500-3 mg Cap Take 1 capsule by mouth every evening.   Last reviewed on 5/26/2022  2:09 PM by Agnieszka Schwab, DO    Review of patient's allergies indicates:   Allergen Reactions    Sulfa (sulfonamide antibiotics) Rash    Last reviewed on  5/26/2022 2:09 PM by Agnieszka Schwab      Tasks added this encounter   11/24/2022 - Refill Call (Auto Added)   Tasks due within next 3 months   12/14/2022 - Clinical - Follow Up Assesement (Annual)     [facility] On license of [facility]  [address] [place] [address]  [address]wy  [facility]  [facility] [address]  Phone: [phone]  Fax: [phone]

## 2022-11-01 DIAGNOSIS — M05.742 RHEUMATOID ARTHRITIS INVOLVING BOTH HANDS WITH POSITIVE RHEUMATOID FACTOR: ICD-10-CM

## 2022-11-01 DIAGNOSIS — M05.741 RHEUMATOID ARTHRITIS INVOLVING BOTH HANDS WITH POSITIVE RHEUMATOID FACTOR: ICD-10-CM

## 2022-11-01 RX ORDER — HYDROXYCHLOROQUINE SULFATE 200 MG/1
TABLET, FILM COATED ORAL
Qty: 60 TABLET | Refills: 3 | OUTPATIENT
Start: 2022-11-01

## 2022-11-02 ENCOUNTER — PATIENT MESSAGE (OUTPATIENT)
Dept: RHEUMATOLOGY | Facility: CLINIC | Age: 52
End: 2022-11-02
Payer: COMMERCIAL

## 2022-11-09 RX ORDER — ONDANSETRON 4 MG/1
4 TABLET, ORALLY DISINTEGRATING ORAL 2 TIMES DAILY
Qty: 10 TABLET | Refills: 3 | Status: SHIPPED | OUTPATIENT
Start: 2022-11-09 | End: 2022-11-14

## 2022-11-25 ENCOUNTER — SPECIALTY PHARMACY (OUTPATIENT)
Dept: PHARMACY | Facility: CLINIC | Age: 52
End: 2022-11-25
Payer: COMMERCIAL

## 2022-11-25 NOTE — TELEPHONE ENCOUNTER
Specialty Pharmacy - Refill Coordination    Specialty Medication Orders Linked to Encounter      Flowsheet Row Most Recent Value   Medication #1 certolizumab pegol (CIMZIA) 400 mg/2 mL (200 mg/mL x 2) SyKt (Order#[id number], Rx#[id number])            Refill Questions - Documented Responses      Flowsheet Row Most Recent Value   Patient Availability and HIPAA Verification    Does patient want to proceed with activity? Yes   HIPAA/medical authority confirmed? Yes   Relationship to patient of person spoken to? Self   Refill Screening Questions    Changes to allergies? No   Changes to medications? No   New conditions since last clinic visit? No   Unplanned office visit, urgent care, ED, or hospital admission in the last 4 weeks? No   How does patient/caregiver feel medication is working? Good   Financial problems or insurance changes? No   How many doses of your specialty medications were missed in the last 4 weeks? 0   Would patient like to speak to a pharmacist? No   When does the patient need to receive the medication? 12/01/22   Refill Delivery Questions    How will the patient receive the medication? MEDRx   When does the patient need to receive the medication? 12/01/22   Shipping Address Home   Address in [facility] confirmed and updated if neccessary? Yes   Expected Copay ($) 0   Is the patient able to afford the medication copay? Yes   Payment Method zero copay   Days supply of Refill 28   Supplies needed? No supplies needed   Refill activity completed? Yes   Refill activity plan Refill scheduled   Shipment/Pickup Date: 11/28/22            Current Outpatient Medications   Medication Sig    amLODIPine (NORVASC) 10 MG tablet Take 10 mg by mouth once daily.    atenolol (TENORMIN) 25 MG tablet Take 25 mg by mouth once daily.    certolizumab pegol (CIMZIA STARTER KIT) 400 mg/2 mL (200 mg/mL x 2) SyKt Inject 2 mL (400mg) into the skin on week 0, week 2, and week 4 (Patient not taking: Reported on 5/26/2022)     certolizumab pegol (CIMZIA) 400 mg/2 mL (200 mg/mL x 2) SyKt Inject 1 mL (200 mg total) into the skin every 14 (fourteen) days.    esomeprazole magnesium 20 mg TbEC Take 20 mg by mouth once daily.    FLUoxetine 40 MG capsule Take 40 mg by mouth once daily.    fluticasone propionate (FLONASE) 50 mcg/actuation nasal spray USE 1 SPRAY EVERY DAY IN EACH NOSTRIL    folic acid (FOLVITE) 1 MG tablet Take 1 tablet (1,000 mcg total) by mouth once daily.    hydrOXYchloroQUINE (PLAQUENIL) 200 mg tablet TAKE 1 TABLET BY MOUTH TWICE DAILY    ibuprofen (ADVIL,MOTRIN) 800 MG tablet TAKE 1 TABLET BY MOUTH 2 OR 3 TIMES A DAY AS NEEDED    losartan (COZAAR) 50 MG tablet Take 50 mg by mouth once daily.    metformin (GLUCOPHAGE) 500 MG tablet Take 500 mg by mouth once daily.    methotrexate 2.5 MG Tab TAKE 10 TABLETS BY MOUTH EVERY 7 DAYS    nystatin (MYCOSTATIN) ointment 1 application 2 (two) times daily.    pravastatin (PRAVACHOL) 10 MG tablet Take 10 mg by mouth every evening.    prednisoLONE acetate (PRED FORTE) 1 % DrpS Place into both eyes.    predniSONE (DELTASONE) 5 MG tablet 20mg x 2 days, then 15mg x 2 days, then 10mg x 2 days then stop; may repeat x 1    traMADoL (ULTRAM) 50 mg tablet TAKE 1 OR 2 TABLETS BY MOUTH EVERY 8 HOURS AS NEEDED FOR PAIN    traZODone (DESYREL) 50 MG tablet TAKE 1 OR 2 TABLETS BY MOUTH EVERY EVENING    turmeric/turmeric ext/pepr ext (TURMERIC-TURMERIC EXT-PEPPER) 500-3 mg Cap Take 1 capsule by mouth every evening.   Last reviewed on 5/26/2022  2:09 PM by Agnieszka Schwab, DO    Review of patient's allergies indicates:   Allergen Reactions    Sulfa (sulfonamide antibiotics) Rash    Last reviewed on  11/9/2022 8:46 AM by Agnieszka Schwab      Tasks added this encounter   12/22/2022 - Refill Call (Auto Added)   Tasks due within next 3 months   12/14/2022 - Clinical - Follow Up Assesement (Annual)     Lidya Hobbs-Julia  [facility]  [address] [facility]  [address]  Phone: [phone]  Fax: [phone]

## 2022-12-20 ENCOUNTER — SPECIALTY PHARMACY (OUTPATIENT)
Dept: PHARMACY | Facility: CLINIC | Age: 52
End: 2022-12-20
Payer: COMMERCIAL

## 2022-12-20 DIAGNOSIS — M06.9 RHEUMATOID ARTHRITIS, INVOLVING UNSPECIFIED SITE, UNSPECIFIED WHETHER RHEUMATOID FACTOR PRESENT: Primary | ICD-10-CM

## 2022-12-20 NOTE — TELEPHONE ENCOUNTER
All lines and monitors discontinued. Discharge instructions given, questions answered.    abmulatory out of ER, escorted by wife.  Instructed not to drive after taking pain medication and pt verbalizes understanding.  Rx x 2 given.   Specialty Pharmacy - Clinical Reassessment    Specialty Medication Orders Linked to Encounter      Flowsheet Row Most Recent Value   Medication #1 certolizumab pegol (CIMZIA) 400 mg/2 mL (200 mg/mL x 2) SyKt (Order#[id number], Rx#[id number])     Outgoing call to contact pt for Cimzia reassessment. No answer. LVM for call back. Chart reviewed.    If no call back, will follow-up on the following at the next refill:  (1) quality of life  (2) side effects         Patient Diagnosis   M06.9 - Rheumatoid arthritis, involving unspecified site, unspecified whether rheumatoid factor present    Annita Bettencourt is a 52 y.o. female, who is followed by the specialty pharmacy service for management and education of her RA.  She has been on therapy with Cimzia since 3/15/2022.  I have reviewed her electronic medical record and current medication list and determined that specialty medication adjustment Is not needed at this time.    Patient has not experienced adverse events.  She Is adherent reporting 1 missed doses since last review.  Adherence has been encouraged with the following mechanism(s): refill reminder call, [facility] excalation to check for DDI with new medications, provide hold guidance when sick.  She is meeting goals of therapy and will continue treatment.        11/25/2022 10/27/2022 9/29/2022 9/6/2022 7/28/2022 6/21/2022 5/24/2022   Follow Up Review   # of missed doses 0 0 0 1       Missed dose on 9/1. Consulted with [facility] team: patient instructed to take her dose as soon as received, and adjust her dosing calendar for her next dose 0 0 0   New Medications? No No No No No Yes       tamiflu No   New Conditions? No No No No No No No   New Allergies? No No No No No No No   Med Effective? Good Good Good Good Good Good Good   Urgent Care? No No No No No Yes       urgent care; covid and flu diagnosis No   Requested Pharmacist? No No No No No No No       Multiple values from one day are sorted in reverse-chronological order          Therapy is appropriate to continue.    Therapy is effective: Yes  On scale of 1 to 10, how does patient rank quality of life? (10 - Best): Unable to Assess  Recommendations: none at this time.  Review Method: Chart Review    Tasks added this encounter   9/20/2023 - Clinical - Follow Up Assesement (Annual)   Tasks due within next 3 months   12/22/2022 - Refill Call (Auto Added)     Anita Francis, NeriD  [facility]  [address] [place], [place] [address]  [facility] [address]  Phone: [phone]  Fax: [phone]

## 2022-12-20 NOTE — TELEPHONE ENCOUNTER
Specialty Pharmacy - Clinical Reassessment  Specialty Pharmacy - Refill Coordination    Specialty Medication Orders Linked to Encounter      Flowsheet Row Most Recent Value   Medication #1 certolizumab pegol (CIMZIA) 400 mg/2 mL (200 mg/mL x 2) SyKt (Order#[id number], Rx#[id number])            Refill Questions - Documented Responses      Flowsheet Row Most Recent Value   Patient Availability and HIPAA Verification    Does patient want to proceed with activity? Yes   HIPAA/medical authority confirmed? Yes   Relationship to patient of person spoken to? Self   Refill Screening Questions    Changes to allergies? No   Changes to medications? No   New conditions since last clinic visit? No   Unplanned office visit, urgent care, ED, or hospital admission in the last 4 weeks? No   How does patient/caregiver feel medication is working? Good   Financial problems or insurance changes? No   How many doses of your specialty medications were missed in the last 4 weeks? 0   Would patient like to speak to a pharmacist? No   When does the patient need to receive the medication? 01/05/23   Refill Delivery Questions    How will the patient receive the medication? MEDRx   When does the patient need to receive the medication? 01/05/23   Shipping Address Home   Address in [facility] confirmed and updated if neccessary? Yes   Expected Copay ($) 0   Is the patient able to afford the medication copay? Yes   Payment Method zero copay   Days supply of Refill 28   Supplies needed? No supplies needed   Refill activity completed? Yes   Refill activity plan Refill scheduled   Shipment/Pickup Date: 12/27/22            Current Outpatient Medications   Medication Sig    tirzepatide (MOUNJARO SUBQ) Inject into the skin.    amLODIPine (NORVASC) 10 MG tablet Take 10 mg by mouth once daily.    atenolol (TENORMIN) 25 MG tablet Take 25 mg by mouth once daily.    certolizumab pegol (CIMZIA STARTER KIT) 400 mg/2 mL (200 mg/mL x 2) SyKt Inject 2 mL  (400mg) into the skin on week 0, week 2, and week 4 (Patient not taking: Reported on 5/26/2022)    certolizumab pegol (CIMZIA) 400 mg/2 mL (200 mg/mL x 2) SyKt Inject 1 mL (200 mg total) into the skin every 14 (fourteen) days.    esomeprazole magnesium 20 mg TbEC Take 20 mg by mouth once daily.    FLUoxetine 40 MG capsule Take 40 mg by mouth once daily.    fluticasone propionate (FLONASE) 50 mcg/actuation nasal spray USE 1 SPRAY EVERY DAY IN EACH NOSTRIL    folic acid (FOLVITE) 1 MG tablet Take 1 tablet (1,000 mcg total) by mouth once daily.    hydrOXYchloroQUINE (PLAQUENIL) 200 mg tablet TAKE 1 TABLET BY MOUTH TWICE DAILY    ibuprofen (ADVIL,MOTRIN) 800 MG tablet TAKE 1 TABLET BY MOUTH 2 OR 3 TIMES A DAY AS NEEDED    losartan (COZAAR) 50 MG tablet Take 50 mg by mouth once daily.    methotrexate 2.5 MG Tab TAKE 10 TABLETS BY MOUTH EVERY 7 DAYS    nystatin (MYCOSTATIN) ointment 1 application 2 (two) times daily.    pravastatin (PRAVACHOL) 10 MG tablet Take 10 mg by mouth every evening.    prednisoLONE acetate (PRED FORTE) 1 % DrpS Place into both eyes.    predniSONE (DELTASONE) 5 MG tablet 20mg x 2 days, then 15mg x 2 days, then 10mg x 2 days then stop; may repeat x 1    traMADoL (ULTRAM) 50 mg tablet TAKE 1 OR 2 TABLETS BY MOUTH EVERY 8 HOURS AS NEEDED FOR PAIN    traZODone (DESYREL) 50 MG tablet TAKE 1 OR 2 TABLETS BY MOUTH EVERY EVENING    turmeric/turmeric ext/pepr ext (TURMERIC-TURMERIC EXT-PEPPER) 500-3 mg Cap Take 1 capsule by mouth every evening.   Last reviewed on 12/20/2022 11:11 AM by Anita Francis, PharmD    Review of patient's allergies indicates:   Allergen Reactions    Sulfa (sulfonamide antibiotics) Rash    Last reviewed on  12/20/2022 11:00 AM by Anita Francis    Interventions added this encounter   Closed: [facility] Patient Intervention - Patient educational resources: certolizumab pegol (CIMZIA STARTER KIT) 400 mg/2 mL (200 mg/mL x 2) SyKt     Tasks added this encounter   9/20/2023 - Clinical - Follow Up  Assesement (Annual)  1/26/2023 - Refill Call (Auto Added)   Tasks due within next 3 months   No tasks due.     Anita Francis, PharmD  [facility]  [address]  [facility] [address]  Phone: [phone]  Fax: [phone]

## 2022-12-21 ENCOUNTER — TELEPHONE (OUTPATIENT)
Dept: RHEUMATOLOGY | Facility: CLINIC | Age: 52
End: 2022-12-21
Payer: COMMERCIAL

## 2022-12-21 DIAGNOSIS — M05.741 RHEUMATOID ARTHRITIS INVOLVING BOTH HANDS WITH POSITIVE RHEUMATOID FACTOR: Primary | ICD-10-CM

## 2022-12-21 DIAGNOSIS — M05.742 RHEUMATOID ARTHRITIS INVOLVING BOTH HANDS WITH POSITIVE RHEUMATOID FACTOR: Primary | ICD-10-CM

## 2022-12-21 NOTE — TELEPHONE ENCOUNTER
If patient want to switch to sq Methotrexate we can do this with generic at local pharmacy.   I don't recommend the pens unless patient has deformity precluding drawing own syringe.       We can discuss more at a f/u appt if she would like to do this.     Dr. Schwab

## 2022-12-21 NOTE — TELEPHONE ENCOUNTER
----- Message from Anita Francis PharmD sent at 12/20/2022 11:22 AM CST -----  Regarding: MTX  Cynthia Schwab,    During Cimzia follow-up assessment with pt she reports nausea over the last 6 months and now relies on Zofran for relief. Pt is on Cimzia (~11% reports of nausea) and Oral MTX 25 mg weekly (nausea occurrence reported in 31% pts). It is suspected due to use of both for her RA treatment she is experiencing Nausea. Pt has experienced great RA improvement since beginning Cimzia. She reports improved QOL, less major flares, and working every day without missing work days. Recommend continue Cimzia but switch to injectable PF MTX to help off-set Gi SE. Per insurance investigation, Rasuvo is preferred on pts plan.     If appropriate please send a new order for Rasuvo mtx injections to [facility] so that we can start filling PF pens to see if that helps lessen nausea.     Sincerely,   Anita Francis, Pharm.D., CSP  Clinical Pharmacist- Inflammatory/ RA  [facility]  [phone]

## 2022-12-22 NOTE — TELEPHONE ENCOUNTER
Patient agrees to switching to injectable Methotrexate. She feels she can handle the syringes without a problem. Seeing you in February but if you want her to start the Rasuvo sooner, we can set up nurse teaching. INGE

## 2022-12-23 RX ORDER — SYRINGE,SAFETY WITH NEEDLE,1ML 28GX1/2"
1 SYRINGE, EMPTY DISPOSABLE MISCELLANEOUS WEEKLY
Qty: 50 EACH | Refills: 2 | Status: SHIPPED | OUTPATIENT
Start: 2022-12-23 | End: 2024-01-31 | Stop reason: SDUPTHER

## 2022-12-23 RX ORDER — METHOTREXATE 25 MG/ML
20 INJECTION, SOLUTION INTRA-ARTERIAL; INTRAMUSCULAR; INTRAVENOUS WEEKLY
Qty: 6 ML | Refills: 3 | Status: SHIPPED | OUTPATIENT
Start: 2022-12-23 | End: 2023-02-13 | Stop reason: SDUPTHER

## 2022-12-23 NOTE — TELEPHONE ENCOUNTER
Per chart rheum discussed mtx injections with with pt and Dr. Schwab sent generic MTX vials to draw up with syringe to pts local pharmacy.

## 2023-01-26 DIAGNOSIS — M05.741 RHEUMATOID ARTHRITIS INVOLVING BOTH HANDS WITH POSITIVE RHEUMATOID FACTOR: ICD-10-CM

## 2023-01-26 DIAGNOSIS — M05.742 RHEUMATOID ARTHRITIS INVOLVING BOTH HANDS WITH POSITIVE RHEUMATOID FACTOR: ICD-10-CM

## 2023-01-26 RX ORDER — CERTOLIZUMAB PEGOL 200 MG/ML
200 INJECTION, SOLUTION SUBCUTANEOUS
Qty: 2 EACH | Refills: 11 | Status: CANCELLED | OUTPATIENT
Start: 2023-01-26 | End: 2024-01-26

## 2023-01-30 ENCOUNTER — SPECIALTY PHARMACY (OUTPATIENT)
Dept: PHARMACY | Facility: CLINIC | Age: 53
End: 2023-01-30
Payer: COMMERCIAL

## 2023-01-30 NOTE — TELEPHONE ENCOUNTER
Specialty Pharmacy - Refill Coordination    Specialty Medication Orders Linked to Encounter      Flowsheet Row Most Recent Value   Medication #1 certolizumab pegol (CIMZIA) 400 mg/2 mL (200 mg/mL x 2) SyKt (Order#[id number], Rx#[id number])          See ivent   Refill Questions - Documented Responses      Flowsheet Row Most Recent Value   Patient Availability and HIPAA Verification    Does patient want to proceed with activity? Yes   HIPAA/medical authority confirmed? Yes   Relationship to patient of person spoken to? Self   Refill Screening Questions    Changes to allergies? No   Changes to medications? No   New conditions since last clinic visit? No   Unplanned office visit, urgent care, ED, or hospital admission in the last 4 weeks? No   How does patient/caregiver feel medication is working? Good   Financial problems or insurance changes? No   How many doses of your specialty medications were missed in the last 4 weeks? 1   Why were doses missed? --  [[facility] thought rx was exipred]   Would patient like to speak to a pharmacist? No   When does the patient need to receive the medication? 02/01/23   Refill Delivery Questions    How will the patient receive the medication? MEDRx   When does the patient need to receive the medication? 02/01/23   Shipping Address Home   Address in [facility] confirmed and updated if neccessary? Yes   Expected Copay ($) 0   Is the patient able to afford the medication copay? Yes   Payment Method zero copay   Days supply of Refill 28   Supplies needed? No supplies needed   Refill activity completed? Yes   Refill activity plan Refill scheduled   Shipment/Pickup Date: 01/31/23            Current Outpatient Medications   Medication Sig    amLODIPine (NORVASC) 10 MG tablet Take 10 mg by mouth once daily.    atenolol (TENORMIN) 25 MG tablet Take 25 mg by mouth once daily.    certolizumab pegol (CIMZIA STARTER KIT) 400 mg/2 mL (200 mg/mL x 2) SyKt Inject 2 mL (400mg) into the skin on week  "0, week 2, and week 4 (Patient not taking: Reported on 5/26/2022)    certolizumab pegol (CIMZIA) 400 mg/2 mL (200 mg/mL x 2) SyKt Inject 1 mL (200 mg total) into the skin every 14 (fourteen) days.    esomeprazole magnesium 20 mg TbEC Take 20 mg by mouth once daily.    FLUoxetine 40 MG capsule Take 40 mg by mouth once daily.    fluticasone propionate (FLONASE) 50 mcg/actuation nasal spray USE 1 SPRAY EVERY DAY IN EACH NOSTRIL    folic acid (FOLVITE) 1 MG tablet Take 1 tablet (1,000 mcg total) by mouth once daily.    hydrOXYchloroQUINE (PLAQUENIL) 200 mg tablet TAKE 1 TABLET BY MOUTH TWICE DAILY    ibuprofen (ADVIL,MOTRIN) 800 MG tablet TAKE 1 TABLET BY MOUTH 2 OR 3 TIMES A DAY AS NEEDED    losartan (COZAAR) 50 MG tablet Take 50 mg by mouth once daily.    methotrexate 2.5 MG Tab TAKE 10 TABLETS BY MOUTH EVERY 7 DAYS    methotrexate 25 mg/mL injection Inject 0.8 mLs (20 mg total) into the muscle once a week. To be taken once weekly with daily folic acid for 365 doses    nystatin (MYCOSTATIN) ointment 1 application 2 (two) times daily.    pravastatin (PRAVACHOL) 10 MG tablet Take 10 mg by mouth every evening.    prednisoLONE acetate (PRED FORTE) 1 % DrpS Place into both eyes.    predniSONE (DELTASONE) 5 MG tablet 20mg x 2 days, then 15mg x 2 days, then 10mg x 2 days then stop; may repeat x 1    syringe with needle, safety (MONOJECT TB SAFETY SYRINGE) 1 mL 28 gauge x 1/2" Syrg 1 application by Misc.(Non-Drug; Combo Route) route once a week.    tirzepatide (MOUNJARO SUBQ) Inject into the skin.    traMADoL (ULTRAM) 50 mg tablet TAKE 1 OR 2 TABLETS BY MOUTH EVERY 8 HOURS AS NEEDED FOR PAIN    traZODone (DESYREL) 50 MG tablet TAKE 1 OR 2 TABLETS BY MOUTH EVERY EVENING    turmeric/turmeric ext/pepr ext (TURMERIC-TURMERIC EXT-PEPPER) 500-3 mg Cap Take 1 capsule by mouth every evening.   Last reviewed on 12/20/2022 11:11 AM by Anita Francis, PharmD    Review of patient's allergies indicates:   Allergen Reactions    Sulfa " (sulfonamide antibiotics) Rash    Last reviewed on  12/23/2022 2:20 AM by Agnieszka Schwab    Interventions added this encounter   Closed: [facility] Patient Intervention - Drug therapy adherence: certolizumab pegol (CIMZIA) 400 mg/2 mL (200 mg/mL x 2) SyKt     Tasks added this encounter   2/22/2023 - Refill Call (Auto Added)   Tasks due within next 3 months   No tasks due.     Maribell Lee, PharmD  [facility]  [address] [place] [address]  Phone: [phone]  Fax: [phone]

## 2023-01-30 NOTE — TELEPHONE ENCOUNTER
Patient called for a Cimzia refill. Patient reported she was due for an injection on 1/26/23. Informed pt [facility] sent a refill request to provider and are waiting on a response. Verified provider; [facility] to follow up once refills are authorized.

## 2023-02-01 DIAGNOSIS — M05.741 RHEUMATOID ARTHRITIS INVOLVING BOTH HANDS WITH POSITIVE RHEUMATOID FACTOR: ICD-10-CM

## 2023-02-01 DIAGNOSIS — M05.742 RHEUMATOID ARTHRITIS INVOLVING BOTH HANDS WITH POSITIVE RHEUMATOID FACTOR: ICD-10-CM

## 2023-02-02 RX ORDER — CERTOLIZUMAB PEGOL 200 MG/ML
200 INJECTION, SOLUTION SUBCUTANEOUS
Qty: 1 EACH | Refills: 11 | Status: SHIPPED | OUTPATIENT
Start: 2023-02-02 | End: 2024-01-24 | Stop reason: SDUPTHER

## 2023-02-13 ENCOUNTER — OFFICE VISIT (OUTPATIENT)
Dept: RHEUMATOLOGY | Facility: CLINIC | Age: 53
End: 2023-02-13
Payer: COMMERCIAL

## 2023-02-13 ENCOUNTER — LAB VISIT (OUTPATIENT)
Dept: LAB | Facility: HOSPITAL | Age: 53
End: 2023-02-13
Attending: INTERNAL MEDICINE
Payer: COMMERCIAL

## 2023-02-13 VITALS
HEART RATE: 99 BPM | WEIGHT: 198 LBS | DIASTOLIC BLOOD PRESSURE: 72 MMHG | BODY MASS INDEX: 36.44 KG/M2 | SYSTOLIC BLOOD PRESSURE: 108 MMHG | HEIGHT: 62 IN

## 2023-02-13 DIAGNOSIS — R53.83 MALAISE AND FATIGUE: ICD-10-CM

## 2023-02-13 DIAGNOSIS — R53.81 MALAISE AND FATIGUE: ICD-10-CM

## 2023-02-13 DIAGNOSIS — Z79.899 HIGH RISK MEDICATIONS (NOT ANTICOAGULANTS) LONG-TERM USE: ICD-10-CM

## 2023-02-13 DIAGNOSIS — M05.742 RHEUMATOID ARTHRITIS INVOLVING BOTH HANDS WITH POSITIVE RHEUMATOID FACTOR: ICD-10-CM

## 2023-02-13 DIAGNOSIS — M05.741 RHEUMATOID ARTHRITIS INVOLVING BOTH HANDS WITH POSITIVE RHEUMATOID FACTOR: ICD-10-CM

## 2023-02-13 DIAGNOSIS — D84.9 IMMUNOSUPPRESSION: Primary | ICD-10-CM

## 2023-02-13 DIAGNOSIS — D84.9 IMMUNOSUPPRESSION: ICD-10-CM

## 2023-02-13 LAB
ALBUMIN SERPL BCP-MCNC: 4.3 G/DL (ref 3.5–5.2)
ALP SERPL-CCNC: 64 U/L (ref 55–135)
ALT SERPL W/O P-5'-P-CCNC: 34 U/L (ref 10–44)
ANION GAP SERPL CALC-SCNC: 9 MMOL/L (ref 8–16)
AST SERPL-CCNC: 21 U/L (ref 10–40)
BASOPHILS # BLD AUTO: 0.1 K/UL (ref 0–0.2)
BASOPHILS NFR BLD: 1.1 % (ref 0–1.9)
BILIRUB SERPL-MCNC: 0.4 MG/DL (ref 0.1–1)
BUN SERPL-MCNC: 17 MG/DL (ref 6–20)
CALCIUM SERPL-MCNC: 10.5 MG/DL (ref 8.7–10.5)
CHLORIDE SERPL-SCNC: 101 MMOL/L (ref 95–110)
CO2 SERPL-SCNC: 26 MMOL/L (ref 23–29)
CREAT SERPL-MCNC: 0.9 MG/DL (ref 0.5–1.4)
CRP SERPL-MCNC: 2.7 MG/L (ref 0–8.2)
DIFFERENTIAL METHOD: ABNORMAL
EOSINOPHIL # BLD AUTO: 0.3 K/UL (ref 0–0.5)
EOSINOPHIL NFR BLD: 2.8 % (ref 0–8)
ERYTHROCYTE [DISTWIDTH] IN BLOOD BY AUTOMATED COUNT: 14.9 % (ref 11.5–14.5)
ERYTHROCYTE [SEDIMENTATION RATE] IN BLOOD BY PHOTOMETRIC METHOD: 12 MM/HR (ref 0–36)
EST. GFR  (NO RACE VARIABLE): >60 ML/MIN/1.73 M^2
GLUCOSE SERPL-MCNC: 91 MG/DL (ref 70–110)
HCT VFR BLD AUTO: 49.1 % (ref 37–48.5)
HGB BLD-MCNC: 16.1 G/DL (ref 12–16)
IMM GRANULOCYTES # BLD AUTO: 0.03 K/UL (ref 0–0.04)
IMM GRANULOCYTES NFR BLD AUTO: 0.3 % (ref 0–0.5)
LYMPHOCYTES # BLD AUTO: 2.3 K/UL (ref 1–4.8)
LYMPHOCYTES NFR BLD: 24.4 % (ref 18–48)
MCH RBC QN AUTO: 29 PG (ref 27–31)
MCHC RBC AUTO-ENTMCNC: 32.8 G/DL (ref 32–36)
MCV RBC AUTO: 89 FL (ref 82–98)
MONOCYTES # BLD AUTO: 0.8 K/UL (ref 0.3–1)
MONOCYTES NFR BLD: 8.3 % (ref 4–15)
NEUTROPHILS # BLD AUTO: 6 K/UL (ref 1.8–7.7)
NEUTROPHILS NFR BLD: 63.1 % (ref 38–73)
NRBC BLD-RTO: 0 /100 WBC
PLATELET # BLD AUTO: 244 K/UL (ref 150–450)
PMV BLD AUTO: 12.2 FL (ref 9.2–12.9)
POTASSIUM SERPL-SCNC: 4.3 MMOL/L (ref 3.5–5.1)
PROT SERPL-MCNC: 7.4 G/DL (ref 6–8.4)
RBC # BLD AUTO: 5.55 M/UL (ref 4–5.4)
SODIUM SERPL-SCNC: 136 MMOL/L (ref 136–145)
WBC # BLD AUTO: 9.44 K/UL (ref 3.9–12.7)

## 2023-02-13 PROCEDURE — 3078F PR MOST RECENT DIASTOLIC BLOOD PRESSURE < 80 MM HG: ICD-10-PCS | Mod: CPTII,S$GLB,, | Performed by: INTERNAL MEDICINE

## 2023-02-13 PROCEDURE — 85651 RBC SED RATE NONAUTOMATED: CPT | Mod: PO | Performed by: INTERNAL MEDICINE

## 2023-02-13 PROCEDURE — 99999 PR PBB SHADOW E&M-EST. PATIENT-LVL III: CPT | Mod: PBBFAC,,, | Performed by: INTERNAL MEDICINE

## 2023-02-13 PROCEDURE — 1159F MED LIST DOCD IN RCRD: CPT | Mod: CPTII,S$GLB,, | Performed by: INTERNAL MEDICINE

## 2023-02-13 PROCEDURE — 3074F SYST BP LT 130 MM HG: CPT | Mod: CPTII,S$GLB,, | Performed by: INTERNAL MEDICINE

## 2023-02-13 PROCEDURE — 3078F DIAST BP <80 MM HG: CPT | Mod: CPTII,S$GLB,, | Performed by: INTERNAL MEDICINE

## 2023-02-13 PROCEDURE — 99214 PR OFFICE/OUTPT VISIT, EST, LEVL IV, 30-39 MIN: ICD-10-PCS | Mod: S$GLB,,, | Performed by: INTERNAL MEDICINE

## 2023-02-13 PROCEDURE — 1159F PR MEDICATION LIST DOCUMENTED IN MEDICAL RECORD: ICD-10-PCS | Mod: CPTII,S$GLB,, | Performed by: INTERNAL MEDICINE

## 2023-02-13 PROCEDURE — 86140 C-REACTIVE PROTEIN: CPT | Performed by: INTERNAL MEDICINE

## 2023-02-13 PROCEDURE — 99214 OFFICE O/P EST MOD 30 MIN: CPT | Mod: S$GLB,,, | Performed by: INTERNAL MEDICINE

## 2023-02-13 PROCEDURE — 85025 COMPLETE CBC W/AUTO DIFF WBC: CPT | Performed by: INTERNAL MEDICINE

## 2023-02-13 PROCEDURE — 80053 COMPREHEN METABOLIC PANEL: CPT | Performed by: INTERNAL MEDICINE

## 2023-02-13 PROCEDURE — 4010F ACE/ARB THERAPY RXD/TAKEN: CPT | Mod: CPTII,S$GLB,, | Performed by: INTERNAL MEDICINE

## 2023-02-13 PROCEDURE — 3074F PR MOST RECENT SYSTOLIC BLOOD PRESSURE < 130 MM HG: ICD-10-PCS | Mod: CPTII,S$GLB,, | Performed by: INTERNAL MEDICINE

## 2023-02-13 PROCEDURE — 3008F BODY MASS INDEX DOCD: CPT | Mod: CPTII,S$GLB,, | Performed by: INTERNAL MEDICINE

## 2023-02-13 PROCEDURE — 4010F PR ACE/ARB THEARPY RXD/TAKEN: ICD-10-PCS | Mod: CPTII,S$GLB,, | Performed by: INTERNAL MEDICINE

## 2023-02-13 PROCEDURE — 99999 PR PBB SHADOW E&M-EST. PATIENT-LVL III: ICD-10-PCS | Mod: PBBFAC,,, | Performed by: INTERNAL MEDICINE

## 2023-02-13 PROCEDURE — 3008F PR BODY MASS INDEX (BMI) DOCUMENTED: ICD-10-PCS | Mod: CPTII,S$GLB,, | Performed by: INTERNAL MEDICINE

## 2023-02-13 PROCEDURE — 36415 COLL VENOUS BLD VENIPUNCTURE: CPT | Mod: PO | Performed by: INTERNAL MEDICINE

## 2023-02-13 RX ORDER — ONDANSETRON 4 MG/1
4 TABLET, FILM COATED ORAL EVERY 8 HOURS PRN
COMMUNITY
Start: 2022-12-27

## 2023-02-13 RX ORDER — FOLIC ACID 1 MG/1
1000 TABLET ORAL DAILY
Qty: 90 TABLET | Refills: 3 | Status: SHIPPED | OUTPATIENT
Start: 2023-02-13 | End: 2023-10-23 | Stop reason: SDUPTHER

## 2023-02-13 RX ORDER — HYDROXYCHLOROQUINE SULFATE 200 MG/1
200 TABLET, FILM COATED ORAL 2 TIMES DAILY
Qty: 60 TABLET | Refills: 3 | Status: SHIPPED | OUTPATIENT
Start: 2023-02-13 | End: 2023-07-05

## 2023-02-13 RX ORDER — METHOTREXATE 25 MG/ML
20 INJECTION, SOLUTION INTRA-ARTERIAL; INTRAMUSCULAR; INTRAVENOUS WEEKLY
Qty: 6 ML | Refills: 3 | Status: SHIPPED | OUTPATIENT
Start: 2023-02-13 | End: 2023-10-23 | Stop reason: SDUPTHER

## 2023-02-13 NOTE — PROGRESS NOTES
Subjective:          Chief Complaint: Annita Bettencourt is a 52 y.o. female who had concerns including Disease Management.    HPI:  Patient is a 52-year-old female here for consultation f/u sero+ high titer RA  Hands at MCP and wrist.   Iritis in 2022 treated with topical gtt,   Changed to Cimzia 3/2022  [facility] (Dr. Goldstein). ? If this is related to RA. Did have very dry eyes on Systane BID at minimum. She completed HCQ sca 2023. No prior hx of iritis.     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   She is walking few miles few times weekly.   Regarding joints + am stiffness this is the worst. and gelling with prolonged sitting. Non-restorative sleep .     She is currently on  Cimzia (3/2022-present)   MTX  sq  25 mg once weekly (cao from oral 12/2022 for nausea)   hydroxychloroquine 200 mgBID (since consultation as previously on once daily)   ibuprofen 800 mg t.i.d. p.r.n..    Tramadol 100mg TID PRN. - rare  Predniosne PRN has not used in nearly 1 year.   She has previously been on varying doses of prednisone PRN flares approx 1-2 monthly (50mg, 40mg, 30mg, 20mg and 10mg etc).     Previous medication:   Humira- congestion/HA .  Enbrel lost efficacy, iritis and URI symptoms returned.       +AM stiffness <30min. Joint improve with activity.   The patient was diagnosed 5+  years ago    She has chronic dry eyes using systane. + dry mouth-  Patient currently has pain in her right shoulder right wrist of rates his pain at 2/10.    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 No MI  Still with sleep difficulty: Flexeril did not help. Doxepin 10mg not helping. Restless with sleep latency, stays asleep once down.    Component      Latest Ref Rng & Units 8/7/2018   NIL      See text IU/mL 0.050   TB1 - Nil      See text IU/mL -0.030   TB2 - Nil      See text IU/mL -0.030   Mitogen - Nil      See text IU/mL 8.140   TB Gold Plus       Negative   Hepatitis B Surface Ag       Negative    Hep B C IgM       Negative   Hep A IgM       Negative   Hepatitis C Ab       Negative   Rheumatoid Factor      0.0 - 15.0 IU/mL 397.0 (H)   CCP Antibodies      <5.0 U/mL 160.5 (H)   Sed Rate      0 - 20 mm/Hr 9   CRP      0.0 - 8.2 mg/L 1.9       REVIEW OF SYSTEMS:    Review of Systems   Constitutional:  Negative for fever.   Eyes:  Negative for redness.   Respiratory:  Negative for cough and shortness of breath.    Cardiovascular:  Negative for chest pain.   Gastrointestinal:  Positive for constipation. Negative for diarrhea.   Genitourinary:  Negative for dysuria.   Skin:  Negative for rash.   Neurological:  Negative for headaches.   Endo/Heme/Allergies:  Bruises/bleeds easily.             Objective:            Past Medical History:   Diagnosis Date    Acid reflux     Anxiety     Depression     Diabetes mellitus     Enlarged heart     Hypertension     Murmur     Rheumatoid arthritis      Family History   Problem Relation Age of Onset    Hypertension Father      Social History     Tobacco Use    Smoking status: Every Day     Packs/day: 0.25     Years: 25.00     Pack years: 6.25     Types: Cigarettes    Smokeless tobacco: Never   Substance Use Topics    Alcohol use: Yes     Comment: socially    Drug use: No         Current Outpatient Medications on File Prior to Visit   Medication Sig Dispense Refill    amLODIPine (NORVASC) 10 MG tablet Take 10 mg by mouth once daily.      certolizumab pegol (CIMZIA) 400 mg/2 mL (200 mg/mL x 2) SyKt Inject 1 mL (200 mg total) into the skin every 14 (fourteen) days. 1 each 11    esomeprazole magnesium 20 mg TbEC Take 20 mg by mouth once daily.      FLUoxetine 40 MG capsule Take 40 mg by mouth once daily.      hydrOXYchloroQUINE (PLAQUENIL) 200 mg tablet TAKE 1 TABLET BY MOUTH TWICE DAILY 60 tablet 3    ibuprofen (ADVIL,MOTRIN) 800 MG tablet TAKE 1 TABLET BY MOUTH 2 OR 3 TIMES A DAY AS NEEDED 90 tablet 2    losartan (COZAAR) 50 MG tablet Take 50 mg by mouth once daily.       "methotrexate 25 mg/mL injection Inject 0.8 mLs (20 mg total) into the muscle once a week. To be taken once weekly with daily folic acid for 365 doses 6 mL 3    nystatin (MYCOSTATIN) ointment 1 application 2 (two) times daily.      ondansetron (ZOFRAN) 4 MG tablet Take 4 mg by mouth every 8 (eight) hours as needed.      pravastatin (PRAVACHOL) 10 MG tablet Take 10 mg by mouth every evening.  5    predniSONE (DELTASONE) 5 MG tablet 20mg x 2 days, then 15mg x 2 days, then 10mg x 2 days then stop; may repeat x 1 40 tablet 2    syringe with needle, safety (MONOJECT TB SAFETY SYRINGE) 1 mL 28 gauge x 1/2" Syrg 1 application by Misc.(Non-Drug; Combo Route) route once a week. 50 each 2    tirzepatide (MOUNJARO SUBQ) Inject into the skin.      traMADoL (ULTRAM) 50 mg tablet TAKE 1 OR 2 TABLETS BY MOUTH EVERY 8 HOURS AS NEEDED FOR PAIN 180 tablet 2    traZODone (DESYREL) 50 MG tablet TAKE 1 OR 2 TABLETS BY MOUTH EVERY EVENING 60 tablet 11    certolizumab pegol (CIMZIA STARTER KIT) 400 mg/2 mL (200 mg/mL x 2) SyKt Inject 2 mL (400mg) into the skin on week 0, week 2, and week 4 (Patient not taking: Reported on 5/26/2022) 2 each 0    folic acid (FOLVITE) 1 MG tablet Take 1 tablet (1,000 mcg total) by mouth once daily. 90 tablet 3    [DISCONTINUED] atenolol (TENORMIN) 25 MG tablet Take 25 mg by mouth once daily.  5    [DISCONTINUED] fluticasone propionate (FLONASE) 50 mcg/actuation nasal spray USE 1 SPRAY EVERY DAY IN EACH NOSTRIL 16 g 3    [DISCONTINUED] methotrexate 2.5 MG Tab TAKE 10 TABLETS BY MOUTH EVERY 7 DAYS 40 tablet 3    [DISCONTINUED] prednisoLONE acetate (PRED FORTE) 1 % DrpS Place into both eyes.      [DISCONTINUED] turmeric/turmeric ext/pepr ext (TURMERIC-TURMERIC EXT-PEPPER) 500-3 mg Cap Take 1 capsule by mouth every evening.       No current facility-administered medications on file prior to visit.       Vitals:    02/13/23 1502   BP: 108/72   Pulse: 99       Physical Exam:    Physical Exam  Constitutional:       " Appearance: She is well-developed.   Eyes:      General: Lids are normal.   Neurological:      Mental Status: She is oriented to person, place, and time.   Psychiatric:         Behavior: Behavior normal.         Thought Content: Thought content normal.             Assessment:       Encounter Diagnoses   Name Primary?    Rheumatoid arthritis involving both hands with positive rheumatoid factor     Immunosuppression Yes    Malaise and fatigue     High risk medications (not anticoagulants) long-term use             Plan:        Immunosuppression  -     methotrexate 25 mg/mL injection; Inject 0.8 mLs (20 mg total) into the muscle once a week. To be taken once weekly with daily folic acid for 365 doses  Dispense: 6 mL; Refill: 3  -     hydrOXYchloroQUINE (PLAQUENIL) 200 mg tablet; Take 1 tablet (200 mg total) by mouth 2 (two) times daily.  Dispense: 60 tablet; Refill: 3  -     folic acid (FOLVITE) 1 MG tablet; Take 1 tablet (1,000 mcg total) by mouth once daily.  Dispense: 90 tablet; Refill: 3  -     CBC Auto Differential; Standing; Expected date: 02/13/2023  -     Comprehensive Metabolic Panel; Standing; Expected date: 02/13/2023  -     Sedimentation rate; Standing; Expected date: 02/13/2023  -     C-Reactive Protein; Standing; Expected date: 02/13/2023    Rheumatoid arthritis involving both hands with positive rheumatoid factor  -     methotrexate 25 mg/mL injection; Inject 0.8 mLs (20 mg total) into the muscle once a week. To be taken once weekly with daily folic acid for 365 doses  Dispense: 6 mL; Refill: 3  -     hydrOXYchloroQUINE (PLAQUENIL) 200 mg tablet; Take 1 tablet (200 mg total) by mouth 2 (two) times daily.  Dispense: 60 tablet; Refill: 3  -     folic acid (FOLVITE) 1 MG tablet; Take 1 tablet (1,000 mcg total) by mouth once daily.  Dispense: 90 tablet; Refill: 3  -     CBC Auto Differential; Standing; Expected date: 02/13/2023  -     Comprehensive Metabolic Panel; Standing; Expected date: 02/13/2023  -      Sedimentation rate; Standing; Expected date: 02/13/2023  -     C-Reactive Protein; Standing; Expected date: 02/13/2023    Malaise and fatigue  -     methotrexate 25 mg/mL injection; Inject 0.8 mLs (20 mg total) into the muscle once a week. To be taken once weekly with daily folic acid for 365 doses  Dispense: 6 mL; Refill: 3  -     hydrOXYchloroQUINE (PLAQUENIL) 200 mg tablet; Take 1 tablet (200 mg total) by mouth 2 (two) times daily.  Dispense: 60 tablet; Refill: 3  -     folic acid (FOLVITE) 1 MG tablet; Take 1 tablet (1,000 mcg total) by mouth once daily.  Dispense: 90 tablet; Refill: 3    High risk medications (not anticoagulants) long-term use  -     CBC Auto Differential; Standing; Expected date: 02/13/2023  -     Comprehensive Metabolic Panel; Standing; Expected date: 02/13/2023  -     Sedimentation rate; Standing; Expected date: 02/13/2023  -     C-Reactive Protein; Standing; Expected date: 02/13/2023      Patient is a 51-year-old female with rheumatoid arthritis     Ok for prednisone if flares as you have done in past.     Failed Humira with URI/HA    MTX 10 tabs weekly    HCQ 200mg BID-UTD 2019    No URI s/sx with Enbrel. But persistent Iritis for over 6 months  Last visit switched to Cimzia. 3/14/22      Patient is aware of the risks benefits side effects and monitoring requirements of biologic therapy including but not limited to disseminated tuberculosis recurrent serious infections suppression of the immune system, injection site reactions.  F/u 4 months.     Follow up in about 4 months (around 6/13/2023).      30min consultation with greater than 50% spent in counseling, chart review and coordination of care. All questions answered.

## 2023-02-14 ENCOUNTER — PATIENT MESSAGE (OUTPATIENT)
Dept: RHEUMATOLOGY | Facility: CLINIC | Age: 53
End: 2023-02-14
Payer: COMMERCIAL

## 2023-02-22 ENCOUNTER — SPECIALTY PHARMACY (OUTPATIENT)
Dept: PHARMACY | Facility: CLINIC | Age: 53
End: 2023-02-22
Payer: COMMERCIAL

## 2023-02-22 DIAGNOSIS — M06.9 RHEUMATOID ARTHRITIS, INVOLVING UNSPECIFIED SITE, UNSPECIFIED WHETHER RHEUMATOID FACTOR PRESENT: Primary | ICD-10-CM

## 2023-02-22 NOTE — TELEPHONE ENCOUNTER
Outgoing call regarding cimzia refill; per pt, she's due to inject today and has a pen on hand; next injection is on 3/8, and she was informed that [facility] will follow up on 2/27 to schedule delivery

## 2023-02-27 NOTE — TELEPHONE ENCOUNTER
Specialty Pharmacy - Refill Coordination    Specialty Medication Orders Linked to Encounter      Flowsheet Row Most Recent Value   Medication #1 certolizumab pegol (CIMZIA) 400 mg/2 mL (200 mg/mL x 2) SyKt (Order#[id number], Rx#[id number])            Refill Questions - Documented Responses      Flowsheet Row Most Recent Value   Patient Availability and HIPAA Verification    Does patient want to proceed with activity? Yes   HIPAA/medical authority confirmed? Yes   Relationship to patient of person spoken to? Self   Refill Screening Questions    Changes to allergies? No   Changes to medications? No   New conditions since last clinic visit? No   Unplanned office visit, urgent care, ED, or hospital admission in the last 4 weeks? No   How does patient/caregiver feel medication is working? Good   Financial problems or insurance changes? No   How many doses of your specialty medications were missed in the last 4 weeks? 0   Would patient like to speak to a pharmacist? No   When does the patient need to receive the medication? 03/03/23   Refill Delivery Questions    How will the patient receive the medication? MEDRx   When does the patient need to receive the medication? 03/03/23   Shipping Address Home   Address in [facility] confirmed and updated if neccessary? Yes   Expected Copay ($) 0   Is the patient able to afford the medication copay? Yes   Payment Method zero copay   Days supply of Refill 28   Supplies needed? No supplies needed   Refill activity completed? Yes   Refill activity plan Refill scheduled   Shipment/Pickup Date: 03/01/23            Current Outpatient Medications   Medication Sig    amLODIPine (NORVASC) 10 MG tablet Take 10 mg by mouth once daily.    certolizumab pegol (CIMZIA STARTER KIT) 400 mg/2 mL (200 mg/mL x 2) SyKt Inject 2 mL (400mg) into the skin on week 0, week 2, and week 4 (Patient not taking: Reported on 5/26/2022)    certolizumab pegol (CIMZIA) 400 mg/2 mL (200 mg/mL x 2) SyKt Inject  "1 mL (200 mg total) into the skin every 14 (fourteen) days.    esomeprazole magnesium 20 mg TbEC Take 20 mg by mouth once daily.    FLUoxetine 40 MG capsule Take 40 mg by mouth once daily.    folic acid (FOLVITE) 1 MG tablet Take 1 tablet (1,000 mcg total) by mouth once daily.    hydrOXYchloroQUINE (PLAQUENIL) 200 mg tablet Take 1 tablet (200 mg total) by mouth 2 (two) times daily.    ibuprofen (ADVIL,MOTRIN) 800 MG tablet TAKE 1 TABLET BY MOUTH 2 OR 3 TIMES A DAY AS NEEDED    losartan (COZAAR) 50 MG tablet Take 50 mg by mouth once daily.    methotrexate 25 mg/mL injection Inject 0.8 mLs (20 mg total) into the muscle once a week. To be taken once weekly with daily folic acid for 365 doses    nystatin (MYCOSTATIN) ointment 1 application 2 (two) times daily.    ondansetron (ZOFRAN) 4 MG tablet Take 4 mg by mouth every 8 (eight) hours as needed.    pravastatin (PRAVACHOL) 10 MG tablet Take 10 mg by mouth every evening.    predniSONE (DELTASONE) 5 MG tablet 20mg x 2 days, then 15mg x 2 days, then 10mg x 2 days then stop; may repeat x 1    syringe with needle, safety (MONOJECT TB SAFETY SYRINGE) 1 mL 28 gauge x 1/2" Syrg 1 application by Misc.(Non-Drug; Combo Route) route once a week.    tirzepatide (MOUNJARO SUBQ) Inject into the skin.    traMADoL (ULTRAM) 50 mg tablet TAKE 1 OR 2 TABLETS BY MOUTH EVERY 8 HOURS AS NEEDED FOR PAIN    traZODone (DESYREL) 50 MG tablet TAKE 1 OR 2 TABLETS BY MOUTH EVERY EVENING   Last reviewed on 2/13/2023  3:04 PM by Eda Cali MA    Review of patient's allergies indicates:   Allergen Reactions    Sulfa (sulfonamide antibiotics) Rash    Last reviewed on  2/13/2023 3:02 PM by Eda Cali      Tasks added this encounter   3/24/2023 - Refill Call (Auto Added)   Tasks due within next 3 months   No tasks due.     Anita Francis, PharmD  [facility]  [address] [place] [id number]  Phone: [phone]  Fax: [phone]        "

## 2023-03-24 ENCOUNTER — SPECIALTY PHARMACY (OUTPATIENT)
Dept: PHARMACY | Facility: CLINIC | Age: 53
End: 2023-03-24
Payer: COMMERCIAL

## 2023-03-24 NOTE — TELEPHONE ENCOUNTER
Specialty Pharmacy - Refill Coordination    Specialty Medication Orders Linked to Encounter      Flowsheet Row Most Recent Value   Medication #1 certolizumab pegol (CIMZIA) 400 mg/2 mL (200 mg/mL x 2) SyKt (Order#[id number], Rx#[id number])            Refill Questions - Documented Responses      Flowsheet Row Most Recent Value   Patient Availability and HIPAA Verification    Does patient want to proceed with activity? Yes   HIPAA/medical authority confirmed? Yes   Relationship to patient of person spoken to? Self   Refill Screening Questions    Changes to allergies? No   Changes to medications? No   New conditions since last clinic visit? No   Unplanned office visit, urgent care, ED, or hospital admission in the last 4 weeks? No   How does patient/caregiver feel medication is working? Good   Financial problems or insurance changes? No   How many doses of your specialty medications were missed in the last 4 weeks? 0   Would patient like to speak to a pharmacist? No   When does the patient need to receive the medication? 03/29/23   Refill Delivery Questions    How will the patient receive the medication? MEDRx   When does the patient need to receive the medication? 03/29/23   Shipping Address Home   Address in [facility] confirmed and updated if neccessary? Yes   Expected Copay ($) 0   Is the patient able to afford the medication copay? Yes   Payment Method zero copay   Days supply of Refill 28   Supplies needed? No supplies needed   Refill activity completed? Yes   Refill activity plan Refill scheduled   Shipment/Pickup Date: 03/27/23            Current Outpatient Medications   Medication Sig    amLODIPine (NORVASC) 10 MG tablet Take 10 mg by mouth once daily.    certolizumab pegol (CIMZIA STARTER KIT) 400 mg/2 mL (200 mg/mL x 2) SyKt Inject 2 mL (400mg) into the skin on week 0, week 2, and week 4 (Patient not taking: Reported on 5/26/2022)    certolizumab pegol (CIMZIA) 400 mg/2 mL (200 mg/mL x 2) SyKt Inject  "1 mL (200 mg total) into the skin every 14 (fourteen) days.    esomeprazole magnesium 20 mg TbEC Take 20 mg by mouth once daily.    FLUoxetine 40 MG capsule Take 40 mg by mouth once daily.    folic acid (FOLVITE) 1 MG tablet Take 1 tablet (1,000 mcg total) by mouth once daily.    hydrOXYchloroQUINE (PLAQUENIL) 200 mg tablet Take 1 tablet (200 mg total) by mouth 2 (two) times daily.    ibuprofen (ADVIL,MOTRIN) 800 MG tablet TAKE 1 TABLET BY MOUTH 2 OR 3 TIMES A DAY AS NEEDED    losartan (COZAAR) 50 MG tablet Take 50 mg by mouth once daily.    methotrexate 25 mg/mL injection Inject 0.8 mLs (20 mg total) into the muscle once a week. To be taken once weekly with daily folic acid for 365 doses    nystatin (MYCOSTATIN) ointment 1 application 2 (two) times daily.    ondansetron (ZOFRAN) 4 MG tablet Take 4 mg by mouth every 8 (eight) hours as needed.    pravastatin (PRAVACHOL) 10 MG tablet Take 10 mg by mouth every evening.    predniSONE (DELTASONE) 5 MG tablet 20mg x 2 days, then 15mg x 2 days, then 10mg x 2 days then stop; may repeat x 1    syringe with needle, safety (MONOJECT TB SAFETY SYRINGE) 1 mL 28 gauge x 1/2" Syrg 1 application by Misc.(Non-Drug; Combo Route) route once a week.    tirzepatide (MOUNJARO SUBQ) Inject into the skin.    traMADoL (ULTRAM) 50 mg tablet TAKE 1 OR 2 TABLETS BY MOUTH EVERY 8 HOURS AS NEEDED FOR PAIN    traZODone (DESYREL) 50 MG tablet TAKE 1 OR 2 TABLETS BY MOUTH EVERY EVENING   Last reviewed on 2/13/2023  3:04 PM by Eda Cali MA    Review of patient's allergies indicates:   Allergen Reactions    Sulfa (sulfonamide antibiotics) Rash    Last reviewed on  2/13/2023 3:02 PM by Eda Cali      Tasks added this encounter   4/19/2023 - Refill Call (Auto Added)   Tasks due within next 3 months   No tasks due.     Sharlene Sanchez [facility]  [address] [place] [id number]  Phone: [phone]  Fax: [phone]        "

## 2023-04-19 ENCOUNTER — SPECIALTY PHARMACY (OUTPATIENT)
Dept: PHARMACY | Facility: CLINIC | Age: 53
End: 2023-04-19

## 2023-04-19 NOTE — TELEPHONE ENCOUNTER
Incoming call from patient for Cimzia refill. However, claim is rejecting for PA required. Patient has one dose in the fridge for this Friday (4/21/23) and will need refill for dose on 5/5/23.

## 2023-04-21 NOTE — TELEPHONE ENCOUNTER
Cimzia PA approved until 4/21/2024.    BI Complete   Rx [facility]    Deductible $1500  Max OOP $3500  [facility] in network

## 2023-04-24 NOTE — TELEPHONE ENCOUNTER
Specialty Pharmacy - Medication/Referral Authorization  Specialty Pharmacy - Refill Coordination    Specialty Medication Orders Linked to Encounter      Flowsheet Row Most Recent Value   Medication #1 certolizumab pegol (CIMZIA) 400 mg/2 mL (200 mg/mL x 2) SyKt (Order#[id number], Rx#[id number])            Refill Questions - Documented Responses      Flowsheet Row Most Recent Value   Refill Screening Questions    Changes to allergies? No   Changes to medications? No   New conditions since last clinic visit? No   Unplanned office visit, urgent care, ED, or hospital admission in the last 4 weeks? No   How does patient/caregiver feel medication is working? Good   Financial problems or insurance changes? No   How many doses of your specialty medications were missed in the last 4 weeks? 0   Would patient like to speak to a pharmacist? No   When does the patient need to receive the medication? 04/28/23   Refill Delivery Questions    How will the patient receive the medication? MEDRx   When does the patient need to receive the medication? 04/28/23   Shipping Address Home   Address in [facility] confirmed and updated if neccessary? Yes   Expected Copay ($) 0   Is the patient able to afford the medication copay? Yes   Payment Method zero copay   Days supply of Refill 28   Supplies needed? No supplies needed   Refill activity completed? Yes   Refill activity plan Refill scheduled   Shipment/Pickup Date: 04/25/23            Current Outpatient Medications   Medication Sig    amLODIPine (NORVASC) 10 MG tablet Take 10 mg by mouth once daily.    certolizumab pegol (CIMZIA STARTER KIT) 400 mg/2 mL (200 mg/mL x 2) SyKt Inject 2 mL (400mg) into the skin on week 0, week 2, and week 4 (Patient not taking: Reported on 5/26/2022)    certolizumab pegol (CIMZIA) 400 mg/2 mL (200 mg/mL x 2) SyKt Inject 1 mL (200 mg total) into the skin every 14 (fourteen) days.    esomeprazole magnesium 20 mg TbEC Take 20 mg by mouth once daily.     "FLUoxetine 40 MG capsule Take 40 mg by mouth once daily.    folic acid (FOLVITE) 1 MG tablet Take 1 tablet (1,000 mcg total) by mouth once daily.    hydrOXYchloroQUINE (PLAQUENIL) 200 mg tablet Take 1 tablet (200 mg total) by mouth 2 (two) times daily.    ibuprofen (ADVIL,MOTRIN) 800 MG tablet TAKE 1 TABLET BY MOUTH 2 OR 3 TIMES A DAY AS NEEDED    losartan (COZAAR) 50 MG tablet Take 50 mg by mouth once daily.    methotrexate 25 mg/mL injection Inject 0.8 mLs (20 mg total) into the muscle once a week. To be taken once weekly with daily folic acid for 365 doses    nystatin (MYCOSTATIN) ointment 1 application 2 (two) times daily.    ondansetron (ZOFRAN) 4 MG tablet Take 4 mg by mouth every 8 (eight) hours as needed.    pravastatin (PRAVACHOL) 10 MG tablet Take 10 mg by mouth every evening.    predniSONE (DELTASONE) 5 MG tablet 20mg x 2 days, then 15mg x 2 days, then 10mg x 2 days then stop; may repeat x 1    syringe with needle, safety (MONOJECT TB SAFETY SYRINGE) 1 mL 28 gauge x 1/2" Syrg 1 application by Misc.(Non-Drug; Combo Route) route once a week.    tirzepatide (MOUNJARO SUBQ) Inject into the skin.    traMADoL (ULTRAM) 50 mg tablet TAKE 1 OR 2 TABLETS BY MOUTH EVERY 8 HOURS AS NEEDED FOR PAIN    traZODone (DESYREL) 50 MG tablet TAKE 1 OR 2 TABLETS BY MOUTH EVERY EVENING   Last reviewed on 2/13/2023  3:04 PM by Eda Cali MA    Review of patient's allergies indicates:   Allergen Reactions    Sulfa (sulfonamide antibiotics) Rash    Last reviewed on  2/13/2023 3:02 PM by Eda Cali      Tasks added this encounter   No tasks added.   Tasks due within next 3 months   4/19/2023 - Benefits Investigation  4/19/2023 - Refill Coordination Outreach (1 time occurrence)     [facility]  [address] [place] [id number]  Phone: [phone]  Fax: [phone]        "

## 2023-05-16 ENCOUNTER — LAB VISIT (OUTPATIENT)
Dept: PRIMARY CARE CLINIC | Facility: CLINIC | Age: 53
End: 2023-05-16
Payer: COMMERCIAL

## 2023-05-16 DIAGNOSIS — Z79.899 HIGH RISK MEDICATIONS (NOT ANTICOAGULANTS) LONG-TERM USE: ICD-10-CM

## 2023-05-16 DIAGNOSIS — M05.742 RHEUMATOID ARTHRITIS INVOLVING BOTH HANDS WITH POSITIVE RHEUMATOID FACTOR: ICD-10-CM

## 2023-05-16 DIAGNOSIS — M05.741 RHEUMATOID ARTHRITIS INVOLVING BOTH HANDS WITH POSITIVE RHEUMATOID FACTOR: ICD-10-CM

## 2023-05-16 DIAGNOSIS — D84.9 IMMUNOSUPPRESSION: ICD-10-CM

## 2023-05-16 LAB
ALBUMIN SERPL BCP-MCNC: 4 G/DL (ref 3.5–5.2)
ALP SERPL-CCNC: 63 U/L (ref 55–135)
ALT SERPL W/O P-5'-P-CCNC: 21 U/L (ref 10–44)
ANION GAP SERPL CALC-SCNC: 7 MMOL/L (ref 8–16)
AST SERPL-CCNC: 15 U/L (ref 10–40)
BASOPHILS # BLD AUTO: 0.07 K/UL (ref 0–0.2)
BASOPHILS NFR BLD: 1 % (ref 0–1.9)
BILIRUB SERPL-MCNC: 0.4 MG/DL (ref 0.1–1)
BUN SERPL-MCNC: 16 MG/DL (ref 6–20)
CALCIUM SERPL-MCNC: 9.7 MG/DL (ref 8.7–10.5)
CHLORIDE SERPL-SCNC: 108 MMOL/L (ref 95–110)
CO2 SERPL-SCNC: 24 MMOL/L (ref 23–29)
CREAT SERPL-MCNC: 0.7 MG/DL (ref 0.5–1.4)
CRP SERPL-MCNC: 3.4 MG/L (ref 0–8.2)
DIFFERENTIAL METHOD: NORMAL
EOSINOPHIL # BLD AUTO: 0.4 K/UL (ref 0–0.5)
EOSINOPHIL NFR BLD: 5 % (ref 0–8)
ERYTHROCYTE [DISTWIDTH] IN BLOOD BY AUTOMATED COUNT: 14 % (ref 11.5–14.5)
ERYTHROCYTE [SEDIMENTATION RATE] IN BLOOD BY PHOTOMETRIC METHOD: 22 MM/HR (ref 0–36)
EST. GFR  (NO RACE VARIABLE): >60 ML/MIN/1.73 M^2
GLUCOSE SERPL-MCNC: 120 MG/DL (ref 70–110)
HCT VFR BLD AUTO: 47.5 % (ref 37–48.5)
HGB BLD-MCNC: 15.3 G/DL (ref 12–16)
IMM GRANULOCYTES # BLD AUTO: 0.01 K/UL (ref 0–0.04)
IMM GRANULOCYTES NFR BLD AUTO: 0.1 % (ref 0–0.5)
LYMPHOCYTES # BLD AUTO: 1.8 K/UL (ref 1–4.8)
LYMPHOCYTES NFR BLD: 25 % (ref 18–48)
MCH RBC QN AUTO: 29 PG (ref 27–31)
MCHC RBC AUTO-ENTMCNC: 32.2 G/DL (ref 32–36)
MCV RBC AUTO: 90 FL (ref 82–98)
MONOCYTES # BLD AUTO: 0.6 K/UL (ref 0.3–1)
MONOCYTES NFR BLD: 8.5 % (ref 4–15)
NEUTROPHILS # BLD AUTO: 4.4 K/UL (ref 1.8–7.7)
NEUTROPHILS NFR BLD: 60.4 % (ref 38–73)
NRBC BLD-RTO: 0 /100 WBC
PLATELET # BLD AUTO: 233 K/UL (ref 150–450)
PMV BLD AUTO: 11.7 FL (ref 9.2–12.9)
POTASSIUM SERPL-SCNC: 4.6 MMOL/L (ref 3.5–5.1)
PROT SERPL-MCNC: 6.6 G/DL (ref 6–8.4)
RBC # BLD AUTO: 5.27 M/UL (ref 4–5.4)
SODIUM SERPL-SCNC: 139 MMOL/L (ref 136–145)
WBC # BLD AUTO: 7.2 K/UL (ref 3.9–12.7)

## 2023-05-16 PROCEDURE — 80053 COMPREHEN METABOLIC PANEL: CPT | Performed by: INTERNAL MEDICINE

## 2023-05-16 PROCEDURE — 36415 PR COLLECTION VENOUS BLOOD,VENIPUNCTURE: ICD-10-PCS | Mod: S$GLB,,, | Performed by: INTERNAL MEDICINE

## 2023-05-16 PROCEDURE — 36415 COLL VENOUS BLD VENIPUNCTURE: CPT | Mod: S$GLB,,, | Performed by: INTERNAL MEDICINE

## 2023-05-16 PROCEDURE — 85025 COMPLETE CBC W/AUTO DIFF WBC: CPT | Performed by: INTERNAL MEDICINE

## 2023-05-16 PROCEDURE — 85652 RBC SED RATE AUTOMATED: CPT | Performed by: INTERNAL MEDICINE

## 2023-05-16 PROCEDURE — 86140 C-REACTIVE PROTEIN: CPT | Performed by: INTERNAL MEDICINE

## 2023-05-16 NOTE — TELEPHONE ENCOUNTER
Specialty Pharmacy - Medication/Referral Authorization  Specialty Pharmacy - Refill Coordination    Specialty Medication Orders Linked to Encounter      Flowsheet Row Most Recent Value   Medication #1 certolizumab pegol (CIMZIA) 400 mg/2 mL (200 mg/mL x 2) SyKt (Order#[id number], Rx#[id number])            Refill Questions - Documented Responses      Flowsheet Row Most Recent Value   Patient Availability and HIPAA Verification    Does patient want to proceed with activity? Yes   HIPAA/medical authority confirmed? Yes   Relationship to patient of person spoken to? Self   Refill Screening Questions    Changes to allergies? No   Changes to medications? No   New conditions since last clinic visit? No   Unplanned office visit, urgent care, ED, or hospital admission in the last 4 weeks? No   How does patient/caregiver feel medication is working? Good   Financial problems or insurance changes? No   How many doses of your specialty medications were missed in the last 4 weeks? 0   Would patient like to speak to a pharmacist? No   When does the patient need to receive the medication? 05/26/23   Refill Delivery Questions    How will the patient receive the medication? MEDRx   When does the patient need to receive the medication? 05/26/23   Shipping Address Home   Address in [facility] confirmed and updated if neccessary? Yes   Expected Copay ($) 0   Is the patient able to afford the medication copay? Yes   Payment Method zero copay   Days supply of Refill 28   Supplies needed? No supplies needed   Refill activity completed? Yes   Refill activity plan Refill scheduled   Shipment/Pickup Date: 05/23/23            Current Outpatient Medications   Medication Sig    amLODIPine (NORVASC) 10 MG tablet Take 10 mg by mouth once daily.    certolizumab pegol (CIMZIA STARTER KIT) 400 mg/2 mL (200 mg/mL x 2) SyKt Inject 2 mL (400mg) into the skin on week 0, week 2, and week 4 (Patient not taking: Reported on 5/26/2022)    certolizumab  "pegol (CIMZIA) 400 mg/2 mL (200 mg/mL x 2) SyKt Inject 1 mL (200 mg total) into the skin every 14 (fourteen) days.    esomeprazole magnesium 20 mg TbEC Take 20 mg by mouth once daily.    FLUoxetine 40 MG capsule Take 40 mg by mouth once daily.    folic acid (FOLVITE) 1 MG tablet Take 1 tablet (1,000 mcg total) by mouth once daily.    hydrOXYchloroQUINE (PLAQUENIL) 200 mg tablet Take 1 tablet (200 mg total) by mouth 2 (two) times daily.    ibuprofen (ADVIL,MOTRIN) 800 MG tablet TAKE 1 TABLET BY MOUTH 2 OR 3 TIMES A DAY AS NEEDED    losartan (COZAAR) 50 MG tablet Take 50 mg by mouth once daily.    methotrexate 25 mg/mL injection Inject 0.8 mLs (20 mg total) into the muscle once a week. To be taken once weekly with daily folic acid for 365 doses    nystatin (MYCOSTATIN) ointment 1 application 2 (two) times daily.    ondansetron (ZOFRAN) 4 MG tablet Take 4 mg by mouth every 8 (eight) hours as needed.    pravastatin (PRAVACHOL) 10 MG tablet Take 10 mg by mouth every evening.    predniSONE (DELTASONE) 5 MG tablet 20mg x 2 days, then 15mg x 2 days, then 10mg x 2 days then stop; may repeat x 1    syringe with needle, safety (MONOJECT TB SAFETY SYRINGE) 1 mL 28 gauge x 1/2" Syrg 1 application by Misc.(Non-Drug; Combo Route) route once a week.    tirzepatide (MOUNJARO SUBQ) Inject into the skin.    traMADoL (ULTRAM) 50 mg tablet TAKE 1 OR 2 TABLETS BY MOUTH EVERY 8 HOURS AS NEEDED FOR PAIN    traZODone (DESYREL) 50 MG tablet TAKE 1 OR 2 TABLETS BY MOUTH EVERY EVENING   Last reviewed on 2/13/2023  3:04 PM by Eda Cali MA    Review of patient's allergies indicates:   Allergen Reactions    Sulfa (sulfonamide antibiotics) Rash    Last reviewed on  2/13/2023 3:02 PM by Eda Cali      Tasks added this encounter   No tasks added.   Tasks due within next 3 months   4/19/2023 - Benefits Investigation  5/16/2023 - Refill Coordination Outreach (1 time occurrence)     Camille Sanchez [facility] - [facility] Pharm[facility]  [address]  [facility] [address]  Phone: [phone]  Fax: [phone]

## 2023-06-13 ENCOUNTER — SPECIALTY PHARMACY (OUTPATIENT)
Dept: PHARMACY | Facility: CLINIC | Age: 53
End: 2023-06-13
Payer: COMMERCIAL

## 2023-06-13 NOTE — TELEPHONE ENCOUNTER
Specialty Pharmacy - Refill Coordination    Specialty Medication Orders Linked to Encounter      Flowsheet Row Most Recent Value   Medication #1 certolizumab pegol (CIMZIA) 400 mg/2 mL (200 mg/mL x 2) SyKt (Order#[id number], Rx#[id number])            Refill Questions - Documented Responses      Flowsheet Row Most Recent Value   Patient Availability and HIPAA Verification    Does patient want to proceed with activity? Yes   HIPAA/medical authority confirmed? Yes   Relationship to patient of person spoken to? Self   Refill Screening Questions    Changes to allergies? No   Changes to medications? No   New conditions since last clinic visit? No   Unplanned office visit, urgent care, ED, or hospital admission in the last 4 weeks? No   How does patient/caregiver feel medication is working? Good   Financial problems or insurance changes? No   How many doses of your specialty medications were missed in the last 4 weeks? 0   Would patient like to speak to a pharmacist? No   When does the patient need to receive the medication? 06/23/23   Refill Delivery Questions    How will the patient receive the medication? MEDRx   When does the patient need to receive the medication? 06/23/23   Shipping Address Home   Address in [facility] confirmed and updated if neccessary? Yes   Expected Copay ($) 0   Is the patient able to afford the medication copay? Yes   Payment Method zero copay   Days supply of Refill 28   Supplies needed? No supplies needed   Refill activity completed? Yes   Refill activity plan Refill scheduled   Shipment/Pickup Date: 06/20/23            Current Outpatient Medications   Medication Sig    amLODIPine (NORVASC) 10 MG tablet Take 10 mg by mouth once daily.    certolizumab pegol (CIMZIA STARTER KIT) 400 mg/2 mL (200 mg/mL x 2) SyKt Inject 2 mL (400mg) into the skin on week 0, week 2, and week 4 (Patient not taking: Reported on 5/26/2022)    certolizumab pegol (CIMZIA) 400 mg/2 mL (200 mg/mL x 2) SyKt Inject  "1 mL (200 mg total) into the skin every 14 (fourteen) days.    esomeprazole magnesium 20 mg TbEC Take 20 mg by mouth once daily.    FLUoxetine 40 MG capsule Take 40 mg by mouth once daily.    folic acid (FOLVITE) 1 MG tablet Take 1 tablet (1,000 mcg total) by mouth once daily.    hydrOXYchloroQUINE (PLAQUENIL) 200 mg tablet Take 1 tablet (200 mg total) by mouth 2 (two) times daily.    ibuprofen (ADVIL,MOTRIN) 800 MG tablet TAKE 1 TABLET BY MOUTH 2 OR 3 TIMES A DAY AS NEEDED    losartan (COZAAR) 50 MG tablet Take 50 mg by mouth once daily.    methotrexate 25 mg/mL injection Inject 0.8 mLs (20 mg total) into the muscle once a week. To be taken once weekly with daily folic acid for 365 doses    nystatin (MYCOSTATIN) ointment 1 application 2 (two) times daily.    ondansetron (ZOFRAN) 4 MG tablet Take 4 mg by mouth every 8 (eight) hours as needed.    pravastatin (PRAVACHOL) 10 MG tablet Take 10 mg by mouth every evening.    predniSONE (DELTASONE) 5 MG tablet 20mg x 2 days, then 15mg x 2 days, then 10mg x 2 days then stop; may repeat x 1    syringe with needle, safety (MONOJECT TB SAFETY SYRINGE) 1 mL 28 gauge x 1/2" Syrg 1 application by Misc.(Non-Drug; Combo Route) route once a week.    tirzepatide (MOUNJARO SUBQ) Inject into the skin.    traMADoL (ULTRAM) 50 mg tablet TAKE 1 OR 2 TABLETS BY MOUTH EVERY 8 HOURS AS NEEDED FOR PAIN    traZODone (DESYREL) 50 MG tablet TAKE 1 OR 2 TABLETS BY MOUTH EVERY EVENING   Last reviewed on 2/13/2023  3:04 PM by Eda Cali MA    Review of patient's allergies indicates:   Allergen Reactions    Sulfa (sulfonamide antibiotics) Rash    Last reviewed on  2/13/2023 3:02 PM by Eda Cali      Tasks added this encounter   No tasks added.   Tasks due within next 3 months   6/13/2023 - Refill Coordination Outreach (1 time occurrence)     Sharlene Sanchez [facility] - Specialty Pharm[facility]  [address] [place] [id number]  Phone: [phone]  Fax: [phone]        "

## 2023-06-20 ENCOUNTER — TELEPHONE (OUTPATIENT)
Dept: RHEUMATOLOGY | Facility: CLINIC | Age: 53
End: 2023-06-20
Payer: COMMERCIAL

## 2023-06-20 ENCOUNTER — OFFICE VISIT (OUTPATIENT)
Dept: RHEUMATOLOGY | Facility: CLINIC | Age: 53
End: 2023-06-20
Payer: COMMERCIAL

## 2023-06-20 DIAGNOSIS — M06.9 RHEUMATOID ARTHRITIS, INVOLVING UNSPECIFIED SITE, UNSPECIFIED WHETHER RHEUMATOID FACTOR PRESENT: Primary | ICD-10-CM

## 2023-06-20 DIAGNOSIS — Z79.899 IMMUNOSUPPRESSION DUE TO DRUG THERAPY: ICD-10-CM

## 2023-06-20 DIAGNOSIS — D84.821 IMMUNOSUPPRESSION DUE TO DRUG THERAPY: ICD-10-CM

## 2023-06-20 DIAGNOSIS — Z79.899 HIGH RISK MEDICATIONS (NOT ANTICOAGULANTS) LONG-TERM USE: ICD-10-CM

## 2023-06-20 PROCEDURE — 1160F RVW MEDS BY RX/DR IN RCRD: CPT | Mod: CPTII,95,, | Performed by: INTERNAL MEDICINE

## 2023-06-20 PROCEDURE — 99215 OFFICE O/P EST HI 40 MIN: CPT | Mod: 95,,, | Performed by: INTERNAL MEDICINE

## 2023-06-20 PROCEDURE — 99215 PR OFFICE/OUTPT VISIT, EST, LEVL V, 40-54 MIN: ICD-10-PCS | Mod: 95,,, | Performed by: INTERNAL MEDICINE

## 2023-06-20 PROCEDURE — 1159F PR MEDICATION LIST DOCUMENTED IN MEDICAL RECORD: ICD-10-PCS | Mod: CPTII,95,, | Performed by: INTERNAL MEDICINE

## 2023-06-20 PROCEDURE — 4010F ACE/ARB THERAPY RXD/TAKEN: CPT | Mod: CPTII,95,, | Performed by: INTERNAL MEDICINE

## 2023-06-20 PROCEDURE — 1159F MED LIST DOCD IN RCRD: CPT | Mod: CPTII,95,, | Performed by: INTERNAL MEDICINE

## 2023-06-20 PROCEDURE — 4010F PR ACE/ARB THEARPY RXD/TAKEN: ICD-10-PCS | Mod: CPTII,95,, | Performed by: INTERNAL MEDICINE

## 2023-06-20 PROCEDURE — 1160F PR REVIEW ALL MEDS BY PRESCRIBER/CLIN PHARMACIST DOCUMENTED: ICD-10-PCS | Mod: CPTII,95,, | Performed by: INTERNAL MEDICINE

## 2023-06-20 NOTE — PROGRESS NOTES
Subjective:          Chief Complaint: Annita Bettencourt is a 52 y.o. female who had no chief complaint listed for this encounter.    HPI:  Patient is a 52-year-old female here for consultation f/u sero+ high titer RA  Hands at MCP and wrist.   Iritis in 2022 treated with topical gtt,   Changed to Cimzia 3/2022  [facility] [place] (Dr. Goldstein). ? If this is related to RA. Did have very dry eyes on Systane BID at minimum. She completed HCQ sca 2023. No prior hx of iritis.     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   She is walking few miles few times weekly.   She has lost 82#, Feeling markedly better! Mounjaro since October 22 with diet and exercise.     Rapid3 Question Responses and Scores 6/20/2023   MDHAQ Score 0   Psychologic Score 1.1   Pain Score 1   When you awakened in the morning OVER THE LAST WEEK, did you feel stiff? No   If Yes, please indicate the number of hours until you are as limber as you will be for the day -   Fatigue Score 0   Global Health Score 1   RAPID3 Score 0.67         She is currently on  Cimzia (3/2022-present)   MTX  sq  25 mg once weekly (cao from oral 12/2022 for nausea)   hydroxychloroquine 200 mgBID (since consultation as previously on once daily)   ibuprofen 800 mg t.i.d. p.r.n..    Tramadol 100mg TID PRN. - rare  Predniosne PRN has not used in nearly 1 year.   She has previously been on varying doses of prednisone PRN flares approx 1-2 monthly (50mg, 40mg, 30mg, 20mg and 10mg etc).     Previous medication:   Humira- congestion/HA .  Enbrel lost efficacy, iritis and URI symptoms returned.       +AM stiffness <30min. Joint improve with activity.   The patient was diagnosed 5+  years ago    She has chronic dry eyes using systane. + dry mouth-  Patient currently has pain in her right shoulder right wrist of rates his pain at 2/10.    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 No MI  Still with sleep difficulty: Flexeril did not help. Doxepin 10mg not  helping. Restless with sleep latency, stays asleep once down.    Component      Latest Ref Rng & Units 8/7/2018   NIL      See text IU/mL 0.050   TB1 - Nil      See text IU/mL -0.030   TB2 - Nil      See text IU/mL -0.030   Mitogen - Nil      See text IU/mL 8.140   TB Gold Plus       Negative   Hepatitis B Surface Ag       Negative   Hep B C IgM       Negative   Hep A IgM       Negative   Hepatitis C Ab       Negative   Rheumatoid Factor      0.0 - 15.0 IU/mL 397.0 (H)   CCP Antibodies      <5.0 U/mL 160.5 (H)   Sed Rate      0 - 20 mm/Hr 9   CRP      0.0 - 8.2 mg/L 1.9       REVIEW OF SYSTEMS:    Review of Systems   Constitutional:  Negative for fever.   Eyes:  Negative for redness.   Respiratory:  Negative for cough and shortness of breath.    Cardiovascular:  Negative for chest pain.   Gastrointestinal:  Negative for constipation and diarrhea.   Genitourinary:  Negative for dysuria.   Skin:  Negative for rash.   Neurological:  Negative for headaches.   Endo/Heme/Allergies:  Does not bruise/bleed easily.             Objective:            Past Medical History:   Diagnosis Date    Acid reflux     Anxiety     Depression     Diabetes mellitus     Enlarged heart     Hypertension     Murmur     Rheumatoid arthritis      Family History   Problem Relation Age of Onset    Hypertension Father      Social History     Tobacco Use    Smoking status: Every Day     Packs/day: 0.25     Years: 25.00     Pack years: 6.25     Types: Cigarettes    Smokeless tobacco: Never   Substance Use Topics    Alcohol use: Yes     Comment: socially    Drug use: No         Current Outpatient Medications on File Prior to Visit   Medication Sig Dispense Refill    amLODIPine (NORVASC) 10 MG tablet Take 10 mg by mouth once daily.      certolizumab pegol (CIMZIA STARTER KIT) 400 mg/2 mL (200 mg/mL x 2) SyKt Inject 2 mL (400mg) into the skin on week 0, week 2, and week 4 (Patient not taking: Reported on 5/26/2022) 2 each 0    certolizumab pegol (CIMZIA)  "400 mg/2 mL (200 mg/mL x 2) SyKt Inject 1 mL (200 mg total) into the skin every 14 (fourteen) days. 1 each 11    esomeprazole magnesium 20 mg TbEC Take 20 mg by mouth once daily.      FLUoxetine 40 MG capsule Take 40 mg by mouth once daily.      folic acid (FOLVITE) 1 MG tablet Take 1 tablet (1,000 mcg total) by mouth once daily. 90 tablet 3    hydrOXYchloroQUINE (PLAQUENIL) 200 mg tablet Take 1 tablet (200 mg total) by mouth 2 (two) times daily. 60 tablet 3    ibuprofen (ADVIL,MOTRIN) 800 MG tablet TAKE 1 TABLET BY MOUTH 2 OR 3 TIMES A DAY AS NEEDED 90 tablet 2    losartan (COZAAR) 50 MG tablet Take 50 mg by mouth once daily.      methotrexate 25 mg/mL injection Inject 0.8 mLs (20 mg total) into the muscle once a week. To be taken once weekly with daily folic acid for 365 doses 6 mL 3    nystatin (MYCOSTATIN) ointment 1 application 2 (two) times daily.      ondansetron (ZOFRAN) 4 MG tablet Take 4 mg by mouth every 8 (eight) hours as needed.      pravastatin (PRAVACHOL) 10 MG tablet Take 10 mg by mouth every evening.  5    predniSONE (DELTASONE) 5 MG tablet 20mg x 2 days, then 15mg x 2 days, then 10mg x 2 days then stop; may repeat x 1 40 tablet 2    syringe with needle, safety (MONOJECT TB SAFETY SYRINGE) 1 mL 28 gauge x 1/2" Syrg 1 application by Misc.(Non-Drug; Combo Route) route once a week. 50 each 2    tirzepatide (MOUNJARO SUBQ) Inject into the skin.      traMADoL (ULTRAM) 50 mg tablet TAKE 1 OR 2 TABLETS BY MOUTH EVERY 8 HOURS AS NEEDED FOR PAIN 180 tablet 2    traZODone (DESYREL) 50 MG tablet TAKE 1 OR 2 TABLETS BY MOUTH EVERY EVENING 60 tablet 11     No current facility-administered medications on file prior to visit.       There were no vitals filed for this visit.      Physical Exam:    Physical Exam  Constitutional:       General: She is not in acute distress.     Appearance: She is well-developed.   Eyes:      General: Lids are normal.   Neurological:      Mental Status: She is alert and oriented to " person, place, and time.   Psychiatric:         Behavior: Behavior normal.         Thought Content: Thought content normal.             Assessment:       Encounter Diagnoses   Name Primary?    Rheumatoid arthritis, involving unspecified site, unspecified whether rheumatoid factor present Yes    Immunosuppression due to drug therapy     High risk medications (not anticoagulants) long-term use               Plan:        Rheumatoid arthritis, involving unspecified site, unspecified whether rheumatoid factor present  -     CBC Auto Differential; Standing; Expected date: 06/20/2023  -     Comprehensive Metabolic Panel; Standing; Expected date: 06/20/2023  -     Sedimentation rate; Standing; Expected date: 06/20/2023  -     C-Reactive Protein; Standing; Expected date: 06/20/2023    Immunosuppression due to drug therapy  -     CBC Auto Differential; Standing; Expected date: 06/20/2023  -     Comprehensive Metabolic Panel; Standing; Expected date: 06/20/2023  -     Sedimentation rate; Standing; Expected date: 06/20/2023  -     C-Reactive Protein; Standing; Expected date: 06/20/2023    High risk medications (not anticoagulants) long-term use  -     CBC Auto Differential; Standing; Expected date: 06/20/2023  -     Comprehensive Metabolic Panel; Standing; Expected date: 06/20/2023  -     Sedimentation rate; Standing; Expected date: 06/20/2023  -     C-Reactive Protein; Standing; Expected date: 06/20/2023        Patient is a 52-year-old female with rheumatoid arthritis     Ok for prednisone if flares as you have done in past.   Failed Humira with URI/HA   Cimzia. Start 3/14/22 doing very well. 200mg SQ every 2 weeks.   MTX 0.8mL every week (she takes more every other week and doing very well)   HCQ 200mg BID-UTD as of 2022     No URI s/sx with Enbrel. But persistent Iritis for over  1 year!     Patient is aware of the risks benefits side effects and monitoring requirements of biologic therapy including but not limited to  disseminated tuberculosis recurrent serious infections suppression of the immune system, injection site reactions.  F/u 4 months.     No follow-ups on file.      F/u in 4 months  Labs to be done at [facility] only.   40min consultation with greater than 50% of that time included Preparing to see the patient (review records, tests), Obtaining and/or reviewing separately obtained historical data, Performing a medically appropriate examination and/or evaluation , Ordering medications, tests, and/or procedures, Referring and communicating with other healthcare professionals , Documenting clinical information in the electronic or other health record and Independently interpreting results  (as warranted) & communicating results to the patient/family/caregiver. All questions answered.  The patient location is: Home [place]  The chief complaint leading to consultation is: medication management and f/u   Visit type: Virtual visit with synchronous audio and video  Total time spent with patient: 40  Each patient to whom he or she provides medical services by telemedicine is:  (1) informed of the relationship between the physician and patient and the respective role of any other health care provider with respect to management of the patient; and (2) notified that he or she may decline to receive medical services by telemedicine and may withdraw from such care at any time.    Notes:

## 2023-06-30 DIAGNOSIS — G47.00 INSOMNIA, UNSPECIFIED TYPE: ICD-10-CM

## 2023-07-04 DIAGNOSIS — M05.742 RHEUMATOID ARTHRITIS INVOLVING BOTH HANDS WITH POSITIVE RHEUMATOID FACTOR: ICD-10-CM

## 2023-07-04 DIAGNOSIS — R53.83 MALAISE AND FATIGUE: ICD-10-CM

## 2023-07-04 DIAGNOSIS — R53.81 MALAISE AND FATIGUE: ICD-10-CM

## 2023-07-04 DIAGNOSIS — M05.741 RHEUMATOID ARTHRITIS INVOLVING BOTH HANDS WITH POSITIVE RHEUMATOID FACTOR: ICD-10-CM

## 2023-07-04 DIAGNOSIS — D84.9 IMMUNOSUPPRESSION: ICD-10-CM

## 2023-07-05 RX ORDER — TRAZODONE HYDROCHLORIDE 50 MG/1
TABLET ORAL
Qty: 60 TABLET | Refills: 11 | Status: SHIPPED | OUTPATIENT
Start: 2023-07-05 | End: 2024-01-31 | Stop reason: SDUPTHER

## 2023-07-05 RX ORDER — HYDROXYCHLOROQUINE SULFATE 200 MG/1
TABLET, FILM COATED ORAL
Qty: 60 TABLET | Refills: 3 | Status: SHIPPED | OUTPATIENT
Start: 2023-07-05 | End: 2023-10-23 | Stop reason: SDUPTHER

## 2023-07-13 ENCOUNTER — SPECIALTY PHARMACY (OUTPATIENT)
Dept: PHARMACY | Facility: CLINIC | Age: 53
End: 2023-07-13
Payer: COMMERCIAL

## 2023-07-13 NOTE — TELEPHONE ENCOUNTER
Specialty Pharmacy - Refill Coordination    Specialty Medication Orders Linked to Encounter      Flowsheet Row Most Recent Value   Medication #1 certolizumab pegol (CIMZIA) 400 mg/2 mL (200 mg/mL x 2) SyKt (Order#[id number], Rx#[id number])            Refill Questions - Documented Responses      Flowsheet Row Most Recent Value   Refill Screening Questions    Changes to allergies? No   Changes to medications? No   New conditions since last clinic visit? No   Unplanned office visit, urgent care, ED, or hospital admission in the last 4 weeks? No   How does patient/caregiver feel medication is working? Good   Financial problems or insurance changes? No   How many doses of your specialty medications were missed in the last 4 weeks? 0   Would patient like to speak to a pharmacist? No   When does the patient need to receive the medication? 07/21/23   Refill Delivery Questions    How will the patient receive the medication? MEDRx   When does the patient need to receive the medication? 07/21/23   Shipping Address Home   Address in [facility] confirmed and updated if neccessary? Yes   Expected Copay ($) 0   Is the patient able to afford the medication copay? Yes   Payment Method zero copay   Days supply of Refill 28   Supplies needed? No supplies needed   Refill activity completed? Yes   Refill activity plan Refill scheduled   Shipment/Pickup Date: 07/18/23            Current Outpatient Medications   Medication Sig    amLODIPine (NORVASC) 10 MG tablet Take 10 mg by mouth once daily.    certolizumab pegol (CIMZIA STARTER KIT) 400 mg/2 mL (200 mg/mL x 2) SyKt Inject 2 mL (400mg) into the skin on week 0, week 2, and week 4 (Patient not taking: Reported on 5/26/2022)    certolizumab pegol (CIMZIA) 400 mg/2 mL (200 mg/mL x 2) SyKt Inject 1 mL (200 mg total) into the skin every 14 (fourteen) days.    esomeprazole magnesium 20 mg TbEC Take 20 mg by mouth once daily.    FLUoxetine 40 MG capsule Take 40 mg by mouth once daily.  "   folic acid (FOLVITE) 1 MG tablet Take 1 tablet (1,000 mcg total) by mouth once daily.    hydrOXYchloroQUINE (PLAQUENIL) 200 mg tablet TAKE 1 TABLET BY MOUTH TWICE DAILY    ibuprofen (ADVIL,MOTRIN) 800 MG tablet TAKE 1 TABLET BY MOUTH 2 OR 3 TIMES A DAY AS NEEDED    losartan (COZAAR) 50 MG tablet Take 50 mg by mouth once daily.    methotrexate 25 mg/mL injection Inject 0.8 mLs (20 mg total) into the muscle once a week. To be taken once weekly with daily folic acid for 365 doses    nystatin (MYCOSTATIN) ointment 1 application 2 (two) times daily.    ondansetron (ZOFRAN) 4 MG tablet Take 4 mg by mouth every 8 (eight) hours as needed.    pravastatin (PRAVACHOL) 10 MG tablet Take 10 mg by mouth every evening.    predniSONE (DELTASONE) 5 MG tablet 20mg x 2 days, then 15mg x 2 days, then 10mg x 2 days then stop; may repeat x 1    syringe with needle, safety (MONOJECT TB SAFETY SYRINGE) 1 mL 28 gauge x 1/2" Syrg 1 application by Misc.(Non-Drug; Combo Route) route once a week.    tirzepatide (MOUNJARO SUBQ) Inject into the skin.    traMADoL (ULTRAM) 50 mg tablet TAKE 1 OR 2 TABLETS BY MOUTH EVERY 8 HOURS AS NEEDED FOR PAIN    traZODone (DESYREL) 50 MG tablet TAKE 1 OR 2 TABLETS BY MOUTH EVERY EVENING   Last reviewed on 6/20/2023  3:16 PM by Agnieszka Schwab, DO    Review of patient's allergies indicates:   Allergen Reactions    Sulfa (sulfonamide antibiotics) Rash    Last reviewed on  6/20/2023 3:16 PM by Agnieszka Schwab      Tasks added this encounter   No tasks added.   Tasks due within next 3 months   9/20/2023 - Clinical Assessment (1 year recurrence)  7/14/2023 - Refill Coordination Outreach (1 time occurrence)     [facility] - [facility]  [address] [place] [id number]  Phone: [phone]  Fax: [phone]        "

## 2023-08-11 ENCOUNTER — SPECIALTY PHARMACY (OUTPATIENT)
Dept: PHARMACY | Facility: CLINIC | Age: 53
End: 2023-08-11
Payer: COMMERCIAL

## 2023-08-11 NOTE — TELEPHONE ENCOUNTER
Specialty Pharmacy - Refill Coordination    Specialty Medication Orders Linked to Encounter      Flowsheet Row Most Recent Value   Medication #1 certolizumab pegol (CIMZIA) 400 mg/2 mL (200 mg/mL x 2) SyKt (Order#[id number], Rx#[id number])            Refill Questions - Documented Responses      Flowsheet Row Most Recent Value   Patient Availability and HIPAA Verification    Does patient want to proceed with activity? Yes   HIPAA/medical authority confirmed? Yes   Relationship to patient of person spoken to? Self   Refill Screening Questions    Changes to allergies? No   Changes to medications? No   New conditions since last clinic visit? No   Unplanned office visit, urgent care, ED, or hospital admission in the last 4 weeks? No   How does patient/caregiver feel medication is working? Good   Financial problems or insurance changes? No   How many doses of your specialty medications were missed in the last 4 weeks? 0   Why were doses missed? Simply forgot   Would patient like to speak to a pharmacist? No   When does the patient need to receive the medication? 08/18/23   Refill Delivery Questions    How will the patient receive the medication? MEDRx   When does the patient need to receive the medication? 08/18/23   Shipping Address Home   Address in [facility] confirmed and updated if neccessary? Yes   Expected Copay ($) 0   Is the patient able to afford the medication copay? Yes   Payment Method zero copay   Days supply of Refill 28   Supplies needed? No supplies needed   Refill activity completed? Yes   Refill activity plan Refill scheduled   Shipment/Pickup Date: 08/14/23            Current Outpatient Medications   Medication Sig    amLODIPine (NORVASC) 10 MG tablet Take 10 mg by mouth once daily.    certolizumab pegol (CIMZIA STARTER KIT) 400 mg/2 mL (200 mg/mL x 2) SyKt Inject 2 mL (400mg) into the skin on week 0, week 2, and week 4 (Patient not taking: Reported on 5/26/2022)    certolizumab pegol (CIMZIA)  "400 mg/2 mL (200 mg/mL x 2) SyKt Inject 1 mL (200 mg total) into the skin every 14 (fourteen) days.    esomeprazole magnesium 20 mg TbEC Take 20 mg by mouth once daily.    FLUoxetine 40 MG capsule Take 40 mg by mouth once daily.    folic acid (FOLVITE) 1 MG tablet Take 1 tablet (1,000 mcg total) by mouth once daily.    hydrOXYchloroQUINE (PLAQUENIL) 200 mg tablet TAKE 1 TABLET BY MOUTH TWICE DAILY    ibuprofen (ADVIL,MOTRIN) 800 MG tablet TAKE 1 TABLET BY MOUTH 2 OR 3 TIMES A DAY AS NEEDED    losartan (COZAAR) 50 MG tablet Take 50 mg by mouth once daily.    methotrexate 25 mg/mL injection Inject 0.8 mLs (20 mg total) into the muscle once a week. To be taken once weekly with daily folic acid for 365 doses    nystatin (MYCOSTATIN) ointment 1 application 2 (two) times daily.    ondansetron (ZOFRAN) 4 MG tablet Take 4 mg by mouth every 8 (eight) hours as needed.    pravastatin (PRAVACHOL) 10 MG tablet Take 10 mg by mouth every evening.    predniSONE (DELTASONE) 5 MG tablet 20mg x 2 days, then 15mg x 2 days, then 10mg x 2 days then stop; may repeat x 1    syringe with needle, safety (MONOJECT TB SAFETY SYRINGE) 1 mL 28 gauge x 1/2" Syrg 1 application by Misc.(Non-Drug; Combo Route) route once a week.    tirzepatide (MOUNJARO SUBQ) Inject into the skin.    traMADoL (ULTRAM) 50 mg tablet TAKE 1 OR 2 TABLETS BY MOUTH EVERY 8 HOURS AS NEEDED FOR PAIN    traZODone (DESYREL) 50 MG tablet TAKE 1 OR 2 TABLETS BY MOUTH EVERY EVENING   Last reviewed on 6/20/2023  3:16 PM by Agnieszka Schwab,     Review of patient's allergies indicates:   Allergen Reactions    Sulfa (sulfonamide antibiotics) Rash    Last reviewed on  6/20/2023 3:16 PM by Agnieszka Schwab      Tasks added this encounter   No tasks added.   Tasks due within next 3 months   9/20/2023 - Clinical Assessment (1 year recurrence)  8/11/2023 - Refill Coordination Outreach (1 time occurrence)     Tanner Diaz  [facility] Pharm[facility]  [address]  [address]  [place] [address]  Phone: [phone]  Fax: [phone]

## 2023-08-14 ENCOUNTER — TELEPHONE (OUTPATIENT)
Dept: SMOKING CESSATION | Facility: CLINIC | Age: 53
End: 2023-08-14
Payer: COMMERCIAL

## 2023-08-29 ENCOUNTER — TELEPHONE (OUTPATIENT)
Dept: SMOKING CESSATION | Facility: CLINIC | Age: 53
End: 2023-08-29
Payer: COMMERCIAL

## 2023-08-29 NOTE — TELEPHONE ENCOUNTER
I called patient to reschedule her tobacco cessation appointment but patient states she is not ready to quit at this time. I gave her my contact information when ready.

## 2023-09-08 ENCOUNTER — PATIENT MESSAGE (OUTPATIENT)
Dept: PHARMACY | Facility: CLINIC | Age: 53
End: 2023-09-08
Payer: COMMERCIAL

## 2023-09-21 ENCOUNTER — LAB VISIT (OUTPATIENT)
Dept: LAB | Facility: HOSPITAL | Age: 53
End: 2023-09-21
Attending: INTERNAL MEDICINE
Payer: COMMERCIAL

## 2023-09-21 DIAGNOSIS — Z79.899 HIGH RISK MEDICATIONS (NOT ANTICOAGULANTS) LONG-TERM USE: ICD-10-CM

## 2023-09-21 DIAGNOSIS — M05.741 RHEUMATOID ARTHRITIS INVOLVING BOTH HANDS WITH POSITIVE RHEUMATOID FACTOR: ICD-10-CM

## 2023-09-21 DIAGNOSIS — D84.9 IMMUNOSUPPRESSION: ICD-10-CM

## 2023-09-21 DIAGNOSIS — M05.742 RHEUMATOID ARTHRITIS INVOLVING BOTH HANDS WITH POSITIVE RHEUMATOID FACTOR: ICD-10-CM

## 2023-09-21 LAB
ALBUMIN SERPL BCP-MCNC: 3.9 G/DL (ref 3.5–5.2)
ALP SERPL-CCNC: 44 U/L (ref 55–135)
ALT SERPL W/O P-5'-P-CCNC: 20 U/L (ref 10–44)
ANION GAP SERPL CALC-SCNC: 7 MMOL/L (ref 8–16)
AST SERPL-CCNC: 17 U/L (ref 10–40)
BASOPHILS # BLD AUTO: 0.11 K/UL (ref 0–0.2)
BASOPHILS NFR BLD: 1.2 % (ref 0–1.9)
BILIRUB SERPL-MCNC: 0.4 MG/DL (ref 0.1–1)
BUN SERPL-MCNC: 14 MG/DL (ref 6–20)
CALCIUM SERPL-MCNC: 9.5 MG/DL (ref 8.7–10.5)
CHLORIDE SERPL-SCNC: 105 MMOL/L (ref 95–110)
CO2 SERPL-SCNC: 26 MMOL/L (ref 23–29)
CREAT SERPL-MCNC: 0.8 MG/DL (ref 0.5–1.4)
CRP SERPL-MCNC: 0.3 MG/L (ref 0–8.2)
DIFFERENTIAL METHOD: NORMAL
EOSINOPHIL # BLD AUTO: 0.4 K/UL (ref 0–0.5)
EOSINOPHIL NFR BLD: 4.6 % (ref 0–8)
ERYTHROCYTE [DISTWIDTH] IN BLOOD BY AUTOMATED COUNT: 14.2 % (ref 11.5–14.5)
ERYTHROCYTE [SEDIMENTATION RATE] IN BLOOD BY PHOTOMETRIC METHOD: <2 MM/HR (ref 0–36)
EST. GFR  (NO RACE VARIABLE): >60 ML/MIN/1.73 M^2
GLUCOSE SERPL-MCNC: 67 MG/DL (ref 70–110)
HCT VFR BLD AUTO: 47.8 % (ref 37–48.5)
HGB BLD-MCNC: 16 G/DL (ref 12–16)
IMM GRANULOCYTES # BLD AUTO: 0.02 K/UL (ref 0–0.04)
IMM GRANULOCYTES NFR BLD AUTO: 0.2 % (ref 0–0.5)
LYMPHOCYTES # BLD AUTO: 2 K/UL (ref 1–4.8)
LYMPHOCYTES NFR BLD: 22.2 % (ref 18–48)
MCH RBC QN AUTO: 30.8 PG (ref 27–31)
MCHC RBC AUTO-ENTMCNC: 33.5 G/DL (ref 32–36)
MCV RBC AUTO: 92 FL (ref 82–98)
MONOCYTES # BLD AUTO: 0.6 K/UL (ref 0.3–1)
MONOCYTES NFR BLD: 6.3 % (ref 4–15)
NEUTROPHILS # BLD AUTO: 5.9 K/UL (ref 1.8–7.7)
NEUTROPHILS NFR BLD: 65.5 % (ref 38–73)
NRBC BLD-RTO: 0 /100 WBC
PLATELET # BLD AUTO: 219 K/UL (ref 150–450)
PMV BLD AUTO: 12.8 FL (ref 9.2–12.9)
POTASSIUM SERPL-SCNC: 4.7 MMOL/L (ref 3.5–5.1)
PROT SERPL-MCNC: 6.5 G/DL (ref 6–8.4)
RBC # BLD AUTO: 5.19 M/UL (ref 4–5.4)
SODIUM SERPL-SCNC: 138 MMOL/L (ref 136–145)
WBC # BLD AUTO: 9.01 K/UL (ref 3.9–12.7)

## 2023-09-21 PROCEDURE — 85025 COMPLETE CBC W/AUTO DIFF WBC: CPT | Performed by: INTERNAL MEDICINE

## 2023-09-21 PROCEDURE — 86140 C-REACTIVE PROTEIN: CPT | Performed by: INTERNAL MEDICINE

## 2023-09-21 PROCEDURE — 85652 RBC SED RATE AUTOMATED: CPT | Performed by: INTERNAL MEDICINE

## 2023-09-21 PROCEDURE — 80053 COMPREHEN METABOLIC PANEL: CPT | Performed by: INTERNAL MEDICINE

## 2023-09-21 PROCEDURE — 36415 COLL VENOUS BLD VENIPUNCTURE: CPT | Mod: PO | Performed by: INTERNAL MEDICINE

## 2023-10-23 ENCOUNTER — OFFICE VISIT (OUTPATIENT)
Dept: RHEUMATOLOGY | Facility: CLINIC | Age: 53
End: 2023-10-23
Payer: COMMERCIAL

## 2023-10-23 ENCOUNTER — PATIENT MESSAGE (OUTPATIENT)
Dept: RHEUMATOLOGY | Facility: CLINIC | Age: 53
End: 2023-10-23

## 2023-10-23 DIAGNOSIS — M05.741 RHEUMATOID ARTHRITIS INVOLVING BOTH HANDS WITH POSITIVE RHEUMATOID FACTOR: Primary | ICD-10-CM

## 2023-10-23 DIAGNOSIS — Z79.899 HIGH RISK MEDICATIONS (NOT ANTICOAGULANTS) LONG-TERM USE: ICD-10-CM

## 2023-10-23 DIAGNOSIS — R53.81 MALAISE AND FATIGUE: ICD-10-CM

## 2023-10-23 DIAGNOSIS — D84.9 IMMUNOSUPPRESSION: ICD-10-CM

## 2023-10-23 DIAGNOSIS — M05.742 RHEUMATOID ARTHRITIS INVOLVING BOTH HANDS WITH POSITIVE RHEUMATOID FACTOR: Primary | ICD-10-CM

## 2023-10-23 DIAGNOSIS — R53.83 MALAISE AND FATIGUE: ICD-10-CM

## 2023-10-23 PROCEDURE — 99215 PR OFFICE/OUTPT VISIT, EST, LEVL V, 40-54 MIN: ICD-10-PCS | Mod: 95,,, | Performed by: INTERNAL MEDICINE

## 2023-10-23 PROCEDURE — 99215 OFFICE O/P EST HI 40 MIN: CPT | Mod: 95,,, | Performed by: INTERNAL MEDICINE

## 2023-10-23 PROCEDURE — 1159F MED LIST DOCD IN RCRD: CPT | Mod: CPTII,95,, | Performed by: INTERNAL MEDICINE

## 2023-10-23 PROCEDURE — 1159F PR MEDICATION LIST DOCUMENTED IN MEDICAL RECORD: ICD-10-PCS | Mod: CPTII,95,, | Performed by: INTERNAL MEDICINE

## 2023-10-23 PROCEDURE — 4010F ACE/ARB THERAPY RXD/TAKEN: CPT | Mod: CPTII,95,, | Performed by: INTERNAL MEDICINE

## 2023-10-23 PROCEDURE — 1160F RVW MEDS BY RX/DR IN RCRD: CPT | Mod: CPTII,95,, | Performed by: INTERNAL MEDICINE

## 2023-10-23 PROCEDURE — 4010F PR ACE/ARB THEARPY RXD/TAKEN: ICD-10-PCS | Mod: CPTII,95,, | Performed by: INTERNAL MEDICINE

## 2023-10-23 PROCEDURE — 1160F PR REVIEW ALL MEDS BY PRESCRIBER/CLIN PHARMACIST DOCUMENTED: ICD-10-PCS | Mod: CPTII,95,, | Performed by: INTERNAL MEDICINE

## 2023-10-23 RX ORDER — FOLIC ACID 1 MG/1
1000 TABLET ORAL DAILY
Qty: 90 TABLET | Refills: 3 | Status: SHIPPED | OUTPATIENT
Start: 2023-10-23 | End: 2024-10-22

## 2023-10-23 RX ORDER — HYDROXYCHLOROQUINE SULFATE 200 MG/1
200 TABLET, FILM COATED ORAL 2 TIMES DAILY
Qty: 60 TABLET | Refills: 3 | Status: SHIPPED | OUTPATIENT
Start: 2023-10-23 | End: 2024-01-31 | Stop reason: SDUPTHER

## 2023-10-23 RX ORDER — METHOTREXATE 25 MG/ML
20 INJECTION, SOLUTION INTRA-ARTERIAL; INTRAMUSCULAR; INTRAVENOUS WEEKLY
Qty: 6 ML | Refills: 3 | Status: SHIPPED | OUTPATIENT
Start: 2023-10-23 | End: 2024-01-31 | Stop reason: SDUPTHER

## 2023-10-23 NOTE — PROGRESS NOTES
Subjective:          Chief Complaint: Annita Bettencourt is a 53 y.o. female who had no chief complaint listed for this encounter.    HPI:  Patient is a 53-year-old female here for consultation f/u sero+ high titer RA  Hands at MCP and wrist.   Iritis in 2022 treated with topical gtt,   Changed to Cimzia 3/2022  [place] [facility] (Dr. Goldstein). ? If this is related to RA. Did have very dry eyes on Systane BID at minimum. She completed OCT scan 2023. No prior hx of iritis.     10/2023: patient doing well Rapid 3: 0.5  Current: Cimzia , HCQ 200mg BID, MTX 0.6mL SQ weekly, folic acid.   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   She is walking few miles few times weekly.   She has lost 82#, Feeling markedly better! Mounjaro since October 22 with diet and exercise.     Rapid3 Question Responses and Scores 6/20/2023   MDHAQ Score 0   Psychologic Score 1.1   Pain Score 1   When you awakened in the morning OVER THE LAST WEEK, did you feel stiff? No   If Yes, please indicate the number of hours until you are as limber as you will be for the day -   Fatigue Score 0   Global Health Score 1   RAPID3 Score 0.67         She is currently on  Cimzia (3/2022-present)   MTX  sq  25 mg once weekly (cao from oral 12/2022 for nausea)   hydroxychloroquine 200 mgBID (since consultation as previously on once daily)   ibuprofen 800 mg t.i.d. p.r.n..    Tramadol 100mg TID PRN. - rare  Predniosne PRN has not used in nearly 1 year.   She has previously been on varying doses of prednisone PRN flares approx 1-2 monthly (50mg, 40mg, 30mg, 20mg and 10mg etc).     Previous medication:   Humira- congestion/HA .  Enbrel lost efficacy, iritis and URI symptoms returned.       +AM stiffness <30min. Joint improve with activity.   The patient was diagnosed 5+  years ago    She has chronic dry eyes using systane. + dry mouth-  Patient currently has pain in her right shoulder right wrist of rates his pain at  2/10.    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 No MI  Still with sleep difficulty: Flexeril did not help. Doxepin 10mg not helping. Restless with sleep latency, stays asleep once down.    Component      Latest Ref Rng & Units 8/7/2018   NIL      See text IU/mL 0.050   TB1 - Nil      See text IU/mL -0.030   TB2 - Nil      See text IU/mL -0.030   Mitogen - Nil      See text IU/mL 8.140   TB Gold Plus       Negative   Hepatitis B Surface Ag       Negative   Hep B C IgM       Negative   Hep A IgM       Negative   Hepatitis C Ab       Negative   Rheumatoid Factor      0.0 - 15.0 IU/mL 397.0 (H)   CCP Antibodies      <5.0 U/mL 160.5 (H)   Sed Rate      0 - 20 mm/Hr 9   CRP      0.0 - 8.2 mg/L 1.9       REVIEW OF SYSTEMS:    Review of Systems   Constitutional:  Negative for fever.   Eyes:  Negative for redness.   Respiratory:  Negative for cough and shortness of breath.    Cardiovascular:  Negative for chest pain.   Gastrointestinal:  Negative for constipation and diarrhea.   Genitourinary:  Negative for dysuria.   Skin:  Negative for rash.   Neurological:  Negative for headaches.   Endo/Heme/Allergies:  Does not bruise/bleed easily.               Objective:            Past Medical History:   Diagnosis Date    Acid reflux     Anxiety     Depression     Diabetes mellitus     Enlarged heart     Hypertension     Murmur     Rheumatoid arthritis      Family History   Problem Relation Age of Onset    Hypertension Father      Social History     Tobacco Use    Smoking status: Every Day     Current packs/day: 0.25     Average packs/day: 0.3 packs/day for 25.0 years (6.3 ttl pk-yrs)     Types: Cigarettes    Smokeless tobacco: Never   Substance Use Topics    Alcohol use: Yes     Comment: socially    Drug use: No         Current Outpatient Medications on File Prior to Visit   Medication Sig Dispense Refill    certolizumab pegol (CIMZIA) 400 mg/2 mL (200 mg/mL x 2) SyKt Inject 1 mL (200 mg total) into the skin  "every 14 (fourteen) days. 1 each 11    esomeprazole magnesium 20 mg TbEC Take 20 mg by mouth once daily.      FLUoxetine 40 MG capsule Take 40 mg by mouth once daily.      folic acid (FOLVITE) 1 MG tablet Take 1 tablet (1,000 mcg total) by mouth once daily. 90 tablet 3    hydrOXYchloroQUINE (PLAQUENIL) 200 mg tablet TAKE 1 TABLET BY MOUTH TWICE DAILY 60 tablet 3    losartan (COZAAR) 50 MG tablet Take 50 mg by mouth once daily.      methotrexate 25 mg/mL injection Inject 0.8 mLs (20 mg total) into the muscle once a week. To be taken once weekly with daily folic acid for 365 doses 6 mL 3    nystatin (MYCOSTATIN) ointment 1 application 2 (two) times daily.      ondansetron (ZOFRAN) 4 MG tablet Take 4 mg by mouth every 8 (eight) hours as needed.      pravastatin (PRAVACHOL) 10 MG tablet Take 10 mg by mouth every evening.  5    predniSONE (DELTASONE) 5 MG tablet 20mg x 2 days, then 15mg x 2 days, then 10mg x 2 days then stop; may repeat x 1 40 tablet 2    syringe with needle, safety (MONOJECT TB SAFETY SYRINGE) 1 mL 28 gauge x 1/2" Syrg 1 application by Misc.(Non-Drug; Combo Route) route once a week. 50 each 2    tirzepatide (MOUNJARO SUBQ) Inject into the skin.      traMADoL (ULTRAM) 50 mg tablet TAKE 1 OR 2 TABLETS BY MOUTH EVERY 8 HOURS AS NEEDED FOR PAIN 180 tablet 2    traZODone (DESYREL) 50 MG tablet TAKE 1 OR 2 TABLETS BY MOUTH EVERY EVENING 60 tablet 11     No current facility-administered medications on file prior to visit.       There were no vitals filed for this visit.      Physical Exam:    Physical Exam  Constitutional:       General: She is not in acute distress.     Appearance: She is well-developed.   Eyes:      General: Lids are normal.   Neurological:      Mental Status: She is alert and oriented to person, place, and time.   Psychiatric:         Behavior: Behavior normal.         Thought Content: Thought content normal.               Assessment:       Encounter Diagnoses   Name Primary?    Rheumatoid " arthritis involving both hands with positive rheumatoid factor Yes    Immunosuppression     Malaise and fatigue     High risk medications (not anticoagulants) long-term use                 Plan:        Rheumatoid arthritis involving both hands with positive rheumatoid factor  -     methotrexate 25 mg/mL injection; Inject 0.8 mLs (20 mg total) into the muscle once a week. To be taken once weekly with daily folic acid for 365 doses  Dispense: 6 mL; Refill: 3  -     hydroxychloroquine (PLAQUENIL) 200 mg tablet; Take 1 tablet (200 mg total) by mouth 2 (two) times daily.  Dispense: 60 tablet; Refill: 3  -     folic acid (FOLVITE) 1 MG tablet; Take 1 tablet (1,000 mcg total) by mouth once daily.  Dispense: 90 tablet; Refill: 3    Immunosuppression  -     methotrexate 25 mg/mL injection; Inject 0.8 mLs (20 mg total) into the muscle once a week. To be taken once weekly with daily folic acid for 365 doses  Dispense: 6 mL; Refill: 3  -     hydroxychloroquine (PLAQUENIL) 200 mg tablet; Take 1 tablet (200 mg total) by mouth 2 (two) times daily.  Dispense: 60 tablet; Refill: 3  -     folic acid (FOLVITE) 1 MG tablet; Take 1 tablet (1,000 mcg total) by mouth once daily.  Dispense: 90 tablet; Refill: 3    Malaise and fatigue  -     methotrexate 25 mg/mL injection; Inject 0.8 mLs (20 mg total) into the muscle once a week. To be taken once weekly with daily folic acid for 365 doses  Dispense: 6 mL; Refill: 3  -     hydroxychloroquine (PLAQUENIL) 200 mg tablet; Take 1 tablet (200 mg total) by mouth 2 (two) times daily.  Dispense: 60 tablet; Refill: 3  -     folic acid (FOLVITE) 1 MG tablet; Take 1 tablet (1,000 mcg total) by mouth once daily.  Dispense: 90 tablet; Refill: 3    High risk medications (not anticoagulants) long-term use          Patient is a 52-year-old female with rheumatoid arthritis     Ok for prednisone if flares as you have done in past.   Failed Humira with URI/HA  Cimzia. Start 3/14/22 doing very well. 200mg SQ  every 2 weeks.   MTX 0.6mL every week (  HCQ 200mg BID-UTD as of 2022     No URI s/sx with Enbrel. But persistent Iritis for over  1 year!     Patient is aware of the risks benefits side effects and monitoring requirements of biologic therapy including but not limited to disseminated tuberculosis recurrent serious infections suppression of the immune system, injection site reactions.  F/u 4 months.     No follow-ups on file.      F/u in 4 months  Labs to be done at [facility] only.   40min consultation with greater than 50% of that time included Preparing to see the patient (review records, tests), Obtaining and/or reviewing separately obtained historical data, Performing a medically appropriate examination and/or evaluation , Ordering medications, tests, and/or procedures, Referring and communicating with other healthcare professionals , Documenting clinical information in the electronic or other health record and Independently interpreting results  (as warranted) & communicating results to the patient/family/caregiver. All questions answered.  The patient location is: Home [place]  The chief complaint leading to consultation is: medication management and f/u   Visit type: Virtual visit with synchronous audio and video  Total time spent with patient: 40  Each patient to whom he or she provides medical services by telemedicine is:  (1) informed of the relationship between the physician and patient and the respective role of any other health care provider with respect to management of the patient; and (2) notified that he or she may decline to receive medical services by telemedicine and may withdraw from such care at any time.    Notes:

## 2023-11-02 ENCOUNTER — PATIENT MESSAGE (OUTPATIENT)
Dept: ADMINISTRATIVE | Facility: OTHER | Age: 53
End: 2023-11-02
Payer: COMMERCIAL

## 2023-11-21 DIAGNOSIS — M05.742 RHEUMATOID ARTHRITIS INVOLVING BOTH HANDS WITH POSITIVE RHEUMATOID FACTOR: ICD-10-CM

## 2023-11-21 DIAGNOSIS — M05.741 RHEUMATOID ARTHRITIS INVOLVING BOTH HANDS WITH POSITIVE RHEUMATOID FACTOR: ICD-10-CM

## 2023-11-21 RX ORDER — TRAMADOL HYDROCHLORIDE 50 MG/1
TABLET ORAL
Qty: 180 TABLET | Refills: 2 | Status: CANCELLED | OUTPATIENT
Start: 2023-11-21

## 2023-11-22 RX ORDER — TRAMADOL HYDROCHLORIDE 50 MG/1
TABLET ORAL
Qty: 180 TABLET | Refills: 2 | Status: SHIPPED | OUTPATIENT
Start: 2023-11-22 | End: 2024-01-31 | Stop reason: SDUPTHER

## 2023-12-02 ENCOUNTER — PATIENT MESSAGE (OUTPATIENT)
Dept: ADMINISTRATIVE | Facility: OTHER | Age: 53
End: 2023-12-02
Payer: COMMERCIAL

## 2023-12-28 ENCOUNTER — PATIENT MESSAGE (OUTPATIENT)
Dept: ADMINISTRATIVE | Facility: OTHER | Age: 53
End: 2023-12-28
Payer: COMMERCIAL

## 2024-01-24 DIAGNOSIS — M05.742 RHEUMATOID ARTHRITIS INVOLVING BOTH HANDS WITH POSITIVE RHEUMATOID FACTOR: ICD-10-CM

## 2024-01-24 DIAGNOSIS — M05.741 RHEUMATOID ARTHRITIS INVOLVING BOTH HANDS WITH POSITIVE RHEUMATOID FACTOR: ICD-10-CM

## 2024-01-25 ENCOUNTER — LAB VISIT (OUTPATIENT)
Dept: LAB | Facility: HOSPITAL | Age: 54
End: 2024-01-25
Attending: INTERNAL MEDICINE
Payer: COMMERCIAL

## 2024-01-25 ENCOUNTER — OFFICE VISIT (OUTPATIENT)
Dept: RHEUMATOLOGY | Facility: CLINIC | Age: 54
End: 2024-01-25
Payer: COMMERCIAL

## 2024-01-25 VITALS
HEART RATE: 92 BPM | BODY MASS INDEX: 26.11 KG/M2 | HEIGHT: 62 IN | WEIGHT: 141.88 LBS | SYSTOLIC BLOOD PRESSURE: 100 MMHG | DIASTOLIC BLOOD PRESSURE: 57 MMHG

## 2024-01-25 DIAGNOSIS — D84.9 IMMUNOSUPPRESSION: ICD-10-CM

## 2024-01-25 DIAGNOSIS — M77.11 RIGHT LATERAL EPICONDYLITIS: ICD-10-CM

## 2024-01-25 DIAGNOSIS — R53.81 MALAISE AND FATIGUE: ICD-10-CM

## 2024-01-25 DIAGNOSIS — M05.741 RHEUMATOID ARTHRITIS INVOLVING BOTH HANDS WITH POSITIVE RHEUMATOID FACTOR: Primary | ICD-10-CM

## 2024-01-25 DIAGNOSIS — R53.83 MALAISE AND FATIGUE: ICD-10-CM

## 2024-01-25 DIAGNOSIS — M05.741 RHEUMATOID ARTHRITIS INVOLVING BOTH HANDS WITH POSITIVE RHEUMATOID FACTOR: ICD-10-CM

## 2024-01-25 DIAGNOSIS — M05.742 RHEUMATOID ARTHRITIS INVOLVING BOTH HANDS WITH POSITIVE RHEUMATOID FACTOR: Primary | ICD-10-CM

## 2024-01-25 DIAGNOSIS — Z79.899 HIGH RISK MEDICATIONS (NOT ANTICOAGULANTS) LONG-TERM USE: ICD-10-CM

## 2024-01-25 DIAGNOSIS — G47.00 INSOMNIA, UNSPECIFIED TYPE: ICD-10-CM

## 2024-01-25 DIAGNOSIS — M05.742 RHEUMATOID ARTHRITIS INVOLVING BOTH HANDS WITH POSITIVE RHEUMATOID FACTOR: ICD-10-CM

## 2024-01-25 LAB
ALBUMIN SERPL BCP-MCNC: 4.3 G/DL (ref 3.5–5.2)
ALP SERPL-CCNC: 46 U/L (ref 55–135)
ALT SERPL W/O P-5'-P-CCNC: 23 U/L (ref 10–44)
ANION GAP SERPL CALC-SCNC: 9 MMOL/L (ref 8–16)
AST SERPL-CCNC: 19 U/L (ref 10–40)
BASOPHILS # BLD AUTO: 0.07 K/UL (ref 0–0.2)
BASOPHILS NFR BLD: 1 % (ref 0–1.9)
BILIRUB SERPL-MCNC: 0.4 MG/DL (ref 0.1–1)
BUN SERPL-MCNC: 23 MG/DL (ref 6–20)
CALCIUM SERPL-MCNC: 10.3 MG/DL (ref 8.7–10.5)
CHLORIDE SERPL-SCNC: 103 MMOL/L (ref 95–110)
CO2 SERPL-SCNC: 28 MMOL/L (ref 23–29)
CREAT SERPL-MCNC: 0.9 MG/DL (ref 0.5–1.4)
CRP SERPL-MCNC: 0.3 MG/L (ref 0–8.2)
DIFFERENTIAL METHOD BLD: ABNORMAL
EOSINOPHIL # BLD AUTO: 0.2 K/UL (ref 0–0.5)
EOSINOPHIL NFR BLD: 3 % (ref 0–8)
ERYTHROCYTE [DISTWIDTH] IN BLOOD BY AUTOMATED COUNT: 13.1 % (ref 11.5–14.5)
ERYTHROCYTE [SEDIMENTATION RATE] IN BLOOD BY PHOTOMETRIC METHOD: <2 MM/HR (ref 0–36)
EST. GFR  (NO RACE VARIABLE): >60 ML/MIN/1.73 M^2
GLUCOSE SERPL-MCNC: 80 MG/DL (ref 70–110)
HAV IGM SERPL QL IA: NORMAL
HBV CORE IGM SERPL QL IA: NORMAL
HBV SURFACE AG SERPL QL IA: NORMAL
HCT VFR BLD AUTO: 49 % (ref 37–48.5)
HCV AB SERPL QL IA: NORMAL
HGB BLD-MCNC: 16 G/DL (ref 12–16)
IMM GRANULOCYTES # BLD AUTO: 0.02 K/UL (ref 0–0.04)
IMM GRANULOCYTES NFR BLD AUTO: 0.3 % (ref 0–0.5)
LYMPHOCYTES # BLD AUTO: 2.4 K/UL (ref 1–4.8)
LYMPHOCYTES NFR BLD: 34 % (ref 18–48)
MCH RBC QN AUTO: 30.8 PG (ref 27–31)
MCHC RBC AUTO-ENTMCNC: 32.7 G/DL (ref 32–36)
MCV RBC AUTO: 94 FL (ref 82–98)
MONOCYTES # BLD AUTO: 0.5 K/UL (ref 0.3–1)
MONOCYTES NFR BLD: 7.7 % (ref 4–15)
NEUTROPHILS # BLD AUTO: 3.7 K/UL (ref 1.8–7.7)
NEUTROPHILS NFR BLD: 54 % (ref 38–73)
NRBC BLD-RTO: 0 /100 WBC
PLATELET # BLD AUTO: 240 K/UL (ref 150–450)
PMV BLD AUTO: 12.4 FL (ref 9.2–12.9)
POTASSIUM SERPL-SCNC: 4.8 MMOL/L (ref 3.5–5.1)
PROT SERPL-MCNC: 7.3 G/DL (ref 6–8.4)
RBC # BLD AUTO: 5.2 M/UL (ref 4–5.4)
SODIUM SERPL-SCNC: 140 MMOL/L (ref 136–145)
WBC # BLD AUTO: 6.91 K/UL (ref 3.9–12.7)

## 2024-01-25 PROCEDURE — 36415 COLL VENOUS BLD VENIPUNCTURE: CPT | Mod: PO,XB | Performed by: INTERNAL MEDICINE

## 2024-01-25 PROCEDURE — 85652 RBC SED RATE AUTOMATED: CPT | Performed by: INTERNAL MEDICINE

## 2024-01-25 PROCEDURE — 3074F SYST BP LT 130 MM HG: CPT | Mod: CPTII,S$GLB,, | Performed by: INTERNAL MEDICINE

## 2024-01-25 PROCEDURE — 80053 COMPREHEN METABOLIC PANEL: CPT | Performed by: INTERNAL MEDICINE

## 2024-01-25 PROCEDURE — 3008F BODY MASS INDEX DOCD: CPT | Mod: CPTII,S$GLB,, | Performed by: INTERNAL MEDICINE

## 2024-01-25 PROCEDURE — 3078F DIAST BP <80 MM HG: CPT | Mod: CPTII,S$GLB,, | Performed by: INTERNAL MEDICINE

## 2024-01-25 PROCEDURE — 99999 PR PBB SHADOW E&M-EST. PATIENT-LVL III: CPT | Mod: PBBFAC,,, | Performed by: INTERNAL MEDICINE

## 2024-01-25 PROCEDURE — 85025 COMPLETE CBC W/AUTO DIFF WBC: CPT | Performed by: INTERNAL MEDICINE

## 2024-01-25 PROCEDURE — 85651 RBC SED RATE NONAUTOMATED: CPT | Mod: PO | Performed by: INTERNAL MEDICINE

## 2024-01-25 PROCEDURE — 99215 OFFICE O/P EST HI 40 MIN: CPT | Mod: 25,S$GLB,, | Performed by: INTERNAL MEDICINE

## 2024-01-25 PROCEDURE — 1159F MED LIST DOCD IN RCRD: CPT | Mod: CPTII,S$GLB,, | Performed by: INTERNAL MEDICINE

## 2024-01-25 PROCEDURE — 86480 TB TEST CELL IMMUN MEASURE: CPT | Performed by: INTERNAL MEDICINE

## 2024-01-25 PROCEDURE — 86140 C-REACTIVE PROTEIN: CPT | Performed by: INTERNAL MEDICINE

## 2024-01-25 PROCEDURE — 20551 NJX 1 TENDON ORIGIN/INSJ: CPT | Mod: S$GLB,,, | Performed by: INTERNAL MEDICINE

## 2024-01-25 PROCEDURE — 80074 ACUTE HEPATITIS PANEL: CPT | Performed by: INTERNAL MEDICINE

## 2024-01-25 RX ORDER — CERTOLIZUMAB PEGOL 200 MG/ML
200 INJECTION, SOLUTION SUBCUTANEOUS
Qty: 1 EACH | Refills: 11 | Status: ACTIVE | OUTPATIENT
Start: 2024-01-25 | End: 2025-01-24

## 2024-01-25 RX ADMIN — METHYLPREDNISOLONE ACETATE 40 MG: 40 INJECTION, SUSPENSION INTRA-ARTICULAR; INTRALESIONAL; INTRAMUSCULAR; SOFT TISSUE at 01:01

## 2024-01-25 NOTE — PROGRESS NOTES
Subjective:          Chief Complaint: Annita Bettencourt is a 53 y.o. female who had concerns including Disease Management.    HPI:  Patient is a 53-year-old female here for consultation f/u sero+ high titer RA  Hands at MCP and wrist.   Iritis in 2022 treated with topical gtt,   Changed to Cimzia 3/2022  [facility] (Dr. Goldstein). ? If this is related to RA. Did have very dry eyes on Systane BID at minimum. She completed OCT scan 2023. No prior hx of iritis.     10/2023: patient doing well Rapid 3: 0.5  Current: Cimzia , HCQ 200mg BID, MTX 0.6mL SQ weekly, folic acid.   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   She is walking few miles few times weekly.   She has lost 82#, Feeling markedly better! Mounjaro since October 22 with diet and exercise.     Rapid3 Question Responses and Scores 6/20/2023   MDHAQ Score 0   Psychologic Score 1.1   Pain Score 1   When you awakened in the morning OVER THE LAST WEEK, did you feel stiff? No   If Yes, please indicate the number of hours until you are as limber as you will be for the day -   Fatigue Score 0   Global Health Score 1   RAPID3 Score 0.67         She is currently on  Cimzia (3/2022-present)   MTX  sq  25 mg once weekly (cao from oral 12/2022 for nausea)   hydroxychloroquine 200 mgBID (since consultation as previously on once daily)   ibuprofen 800 mg t.i.d. p.r.n..    Tramadol 100mg TID PRN. - rare  Predniosne PRN has not used in nearly 1 year.   She has previously been on varying doses of prednisone PRN flares approx 1-2 monthly (50mg, 40mg, 30mg, 20mg and 10mg etc).     Previous medication:   Humira- congestion/HA .  Enbrel lost efficacy, iritis and URI symptoms returned.       +AM stiffness <30min. Joint improve with activity.   The patient was diagnosed 5+  years ago    She has chronic dry eyes using systane. + dry mouth-  Patient currently has pain in her right shoulder right wrist of rates his pain at 2/10.     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 No MI  Still with sleep difficulty: Flexeril did not help. Doxepin 10mg not helping. Restless with sleep latency, stays asleep once down.    Component      Latest Ref Rng & Units 8/7/2018   NIL      See text IU/mL 0.050   TB1 - Nil      See text IU/mL -0.030   TB2 - Nil      See text IU/mL -0.030   Mitogen - Nil      See text IU/mL 8.140   TB Gold Plus       Negative   Hepatitis B Surface Ag       Negative   Hep B C IgM       Negative   Hep A IgM       Negative   Hepatitis C Ab       Negative   Rheumatoid Factor      0.0 - 15.0 IU/mL 397.0 (H)   CCP Antibodies      <5.0 U/mL 160.5 (H)   Sed Rate      0 - 20 mm/Hr 9   CRP      0.0 - 8.2 mg/L 1.9       REVIEW OF SYSTEMS:    Review of Systems   Constitutional:  Negative for fever.   Eyes:  Negative for redness.   Respiratory:  Negative for cough and shortness of breath.    Cardiovascular:  Negative for chest pain.   Gastrointestinal:  Negative for constipation and diarrhea.   Genitourinary:  Negative for dysuria.   Skin:  Negative for rash.   Neurological:  Negative for headaches.   Endo/Heme/Allergies:  Does not bruise/bleed easily.               Objective:            Past Medical History:   Diagnosis Date    Acid reflux     Anxiety     Depression     Diabetes mellitus     Enlarged heart     Hypertension     Murmur     Rheumatoid arthritis      Family History   Problem Relation Age of Onset    Hypertension Father      Social History     Tobacco Use    Smoking status: Every Day     Current packs/day: 0.25     Average packs/day: 0.3 packs/day for 25.0 years (6.3 ttl pk-yrs)     Types: Cigarettes    Smokeless tobacco: Never   Substance Use Topics    Alcohol use: Yes     Comment: socially    Drug use: No         Current Outpatient Medications on File Prior to Visit   Medication Sig Dispense Refill    certolizumab pegol (CIMZIA) 400 mg/2 mL (200 mg/mL x 2) SyKt Inject 1 mL (200 mg total) into the skin every 14  "(fourteen) days. 1 each 11    esomeprazole magnesium 20 mg TbEC Take 20 mg by mouth once daily.      FLUoxetine 40 MG capsule Take 40 mg by mouth once daily.      folic acid (FOLVITE) 1 MG tablet Take 1 tablet (1,000 mcg total) by mouth once daily. 90 tablet 3    hydroxychloroquine (PLAQUENIL) 200 mg tablet Take 1 tablet (200 mg total) by mouth 2 (two) times daily. 60 tablet 3    losartan (COZAAR) 50 MG tablet Take 50 mg by mouth once daily.      methotrexate 25 mg/mL injection Inject 0.8 mLs (20 mg total) into the muscle once a week. To be taken once weekly with daily folic acid for 365 doses 6 mL 3    ondansetron (ZOFRAN) 4 MG tablet Take 4 mg by mouth every 8 (eight) hours as needed.      syringe with needle, safety (MONOJECT TB SAFETY SYRINGE) 1 mL 28 gauge x 1/2" Syrg 1 application by Misc.(Non-Drug; Combo Route) route once a week. 50 each 2    tirzepatide (MOUNJARO SUBQ) Inject into the skin.      traMADoL (ULTRAM) 50 mg tablet TAKE 1 OR 2 TABLETS BY MOUTH EVERY 8 HOURS AS NEEDED FOR PAIN 180 tablet 2    traZODone (DESYREL) 50 MG tablet TAKE 1 OR 2 TABLETS BY MOUTH EVERY EVENING 60 tablet 11    nystatin (MYCOSTATIN) ointment 1 application 2 (two) times daily.      pravastatin (PRAVACHOL) 10 MG tablet Take 10 mg by mouth every evening.  5     No current facility-administered medications on file prior to visit.       Vitals:    01/25/24 1256   BP: (!) 100/57   Pulse: 92         Physical Exam:    Physical Exam  Constitutional:       General: She is not in acute distress.     Appearance: She is well-developed.   Eyes:      General: Lids are normal.   Neurological:      Mental Status: She is alert and oriented to person, place, and time.   Psychiatric:         Behavior: Behavior normal.         Thought Content: Thought content normal.             Assessment:       Encounter Diagnoses   Name Primary?    Rheumatoid arthritis involving both hands with positive rheumatoid factor Yes    Immunosuppression     High risk " "medications (not anticoagulants) long-term use     Malaise and fatigue     Right lateral epicondylitis                   Plan:        Rheumatoid arthritis involving both hands with positive rheumatoid factor  -     Hepatitis Panel, Acute; Future; Expected date: 01/25/2024  -     Quantiferon Gold TB; Future; Expected date: 01/25/2024  -     CBC Auto Differential; Standing; Expected date: 01/25/2024  -     Comprehensive Metabolic Panel; Standing; Expected date: 01/25/2024  -     Sedimentation rate; Standing; Expected date: 01/25/2024  -     C-Reactive Protein; Standing; Expected date: 01/25/2024  -     hydroxychloroquine (PLAQUENIL) 200 mg tablet; Take 1 tablet (200 mg total) by mouth 2 (two) times daily.  Dispense: 60 tablet; Refill: 3  -     methotrexate 25 mg/mL injection; Inject 0.8 mLs (20 mg total) into the muscle once a week. To be taken once weekly with daily folic acid for 365 doses  Dispense: 6 mL; Refill: 3  -     syringe with needle, safety (MONOJECT TB SAFETY SYRINGE) 1 mL 28 gauge x 1/2" Syrg; 1 application  by Misc.(Non-Drug; Combo Route) route once a week.  Dispense: 50 each; Refill: 2  -     traMADoL (ULTRAM) 50 mg tablet; TAKE 1 OR 2 TABLETS BY MOUTH EVERY 8 HOURS AS NEEDED FOR PAIN  Dispense: 180 tablet; Refill: 2    Immunosuppression  -     Hepatitis Panel, Acute; Future; Expected date: 01/25/2024  -     Quantiferon Gold TB; Future; Expected date: 01/25/2024  -     CBC Auto Differential; Standing; Expected date: 01/25/2024  -     Comprehensive Metabolic Panel; Standing; Expected date: 01/25/2024  -     Sedimentation rate; Standing; Expected date: 01/25/2024  -     C-Reactive Protein; Standing; Expected date: 01/25/2024  -     hydroxychloroquine (PLAQUENIL) 200 mg tablet; Take 1 tablet (200 mg total) by mouth 2 (two) times daily.  Dispense: 60 tablet; Refill: 3  -     methotrexate 25 mg/mL injection; Inject 0.8 mLs (20 mg total) into the muscle once a week. To be taken once weekly with daily folic " acid for 365 doses  Dispense: 6 mL; Refill: 3    High risk medications (not anticoagulants) long-term use  -     Hepatitis Panel, Acute; Future; Expected date: 01/25/2024  -     Quantiferon Gold TB; Future; Expected date: 01/25/2024  -     CBC Auto Differential; Standing; Expected date: 01/25/2024  -     Comprehensive Metabolic Panel; Standing; Expected date: 01/25/2024  -     Sedimentation rate; Standing; Expected date: 01/25/2024  -     C-Reactive Protein; Standing; Expected date: 01/25/2024    Malaise and fatigue  -     Hepatitis Panel, Acute; Future; Expected date: 01/25/2024  -     hydroxychloroquine (PLAQUENIL) 200 mg tablet; Take 1 tablet (200 mg total) by mouth 2 (two) times daily.  Dispense: 60 tablet; Refill: 3  -     methotrexate 25 mg/mL injection; Inject 0.8 mLs (20 mg total) into the muscle once a week. To be taken once weekly with daily folic acid for 365 doses  Dispense: 6 mL; Refill: 3    Right lateral epicondylitis  -     Tendon Origin: R elbow    Insomnia, unspecified type  -     traZODone (DESYREL) 50 MG tablet; TAKE 1 OR 2 TABLETS BY MOUTH EVERY EVENING  Dispense: 60 tablet; Refill: 11            Patient is a 52-year-old female with rheumatoid arthritis     Ok for prednisone if flares as you have done in past.   Failed Humira with URI/HA  Cimzia. Start 3/14/22 doing very well. 200mg SQ every 2 weeks. Refilled to [facility] for [facility]   MTX 0.6mL every week (  HCQ 200mg BID-UTD as of 2022       Patient is aware of the risks benefits side effects and monitoring requirements of biologic therapy including but not limited to disseminated tuberculosis recurrent serious infections suppression of the immune system, injection site reactions.  F/u 4 months.     No follow-ups on file.      40min consultation with greater than 50% of that time included Preparing to see the patient (review records, tests), Obtaining and/or reviewing separately obtained historical data, Performing a medically appropriate examination  and/or evaluation , Ordering medications, tests, and/or procedures, Referring and communicating with other healthcare professionals , Documenting clinical information in the electronic or other health record and Independently interpreting results  (as warranted) & communicating results to the patient/family/caregiver. All questions answered.

## 2024-01-29 LAB
GAMMA INTERFERON BACKGROUND BLD IA-ACNC: 0 IU/ML
M TB IFN-G CD4+ BCKGRND COR BLD-ACNC: 0 IU/ML
M TB IFN-G CD4+ BCKGRND COR BLD-ACNC: 0.01 IU/ML
MITOGEN IGNF BCKGRD COR BLD-ACNC: 9.99 IU/ML
TB GOLD PLUS: NEGATIVE

## 2024-01-31 RX ORDER — TRAMADOL HYDROCHLORIDE 50 MG/1
TABLET ORAL
Qty: 180 TABLET | Refills: 2 | Status: SHIPPED | OUTPATIENT
Start: 2024-01-31 | End: 2024-05-30 | Stop reason: SDUPTHER

## 2024-01-31 RX ORDER — METHOTREXATE 25 MG/ML
20 INJECTION, SOLUTION INTRA-ARTERIAL; INTRAMUSCULAR; INTRAVENOUS WEEKLY
Qty: 6 ML | Refills: 3 | Status: SHIPPED | OUTPATIENT
Start: 2024-01-31 | End: 2024-05-30 | Stop reason: SDUPTHER

## 2024-01-31 RX ORDER — HYDROXYCHLOROQUINE SULFATE 200 MG/1
200 TABLET, FILM COATED ORAL 2 TIMES DAILY
Qty: 60 TABLET | Refills: 3 | Status: SHIPPED | OUTPATIENT
Start: 2024-01-31 | End: 2024-05-30 | Stop reason: SDUPTHER

## 2024-01-31 RX ORDER — SYRINGE,SAFETY WITH NEEDLE,1ML 28GX1/2"
1 SYRINGE, EMPTY DISPOSABLE MISCELLANEOUS WEEKLY
Qty: 50 EACH | Refills: 2 | Status: SHIPPED | OUTPATIENT
Start: 2024-01-31

## 2024-01-31 RX ORDER — TRAZODONE HYDROCHLORIDE 50 MG/1
TABLET ORAL
Qty: 60 TABLET | Refills: 11 | Status: SHIPPED | OUTPATIENT
Start: 2024-01-31

## 2024-01-31 RX ORDER — METHYLPREDNISOLONE ACETATE 40 MG/ML
40 INJECTION, SUSPENSION INTRA-ARTICULAR; INTRALESIONAL; INTRAMUSCULAR; SOFT TISSUE
Status: DISCONTINUED | OUTPATIENT
Start: 2024-01-25 | End: 2024-01-31 | Stop reason: HOSPADM

## 2024-02-01 NOTE — PROCEDURES
Tendon Origin: R elbow    Date/Time: 1/25/2024 1:00 PM    Performed by: Agnieszka Schwab,   Authorized by: Agnieszka Schwab, DO    Consent Done?:  Yes (Verbal)  Timeout: prior to procedure the correct patient, procedure, and site was verified    Indications:  Arthritis  Site marked: the procedure site was marked    Timeout: prior to procedure the correct patient, procedure, and site was verified    Location:  Elbow  Site:  R elbow  Prep: patient was prepped and draped in usual sterile fashion    Ultrasonic Guidance for Needle Placement?: No    Needle size:  25 G  Medications:  40 mg methylPREDNISolone acetate 40 mg/mL  Patient tolerance:  Patient tolerated the procedure well with no immediate complications   Patient informed of the risks, benefits, side effects of corticosteroid injections including but not limited to skin hypopigmentation, atrophy, ineffectiveness and infection.

## 2024-02-25 ENCOUNTER — PATIENT MESSAGE (OUTPATIENT)
Dept: ADMINISTRATIVE | Facility: OTHER | Age: 54
End: 2024-02-25
Payer: COMMERCIAL

## 2024-03-02 ENCOUNTER — HOSPITAL ENCOUNTER (OUTPATIENT)
Dept: RADIOLOGY | Facility: HOSPITAL | Age: 54
Discharge: HOME | End: 2024-03-02
Attending: FAMILY MEDICINE
Payer: COMMERCIAL

## 2024-03-02 DIAGNOSIS — Z12.31 ENCOUNTER FOR SCREENING MAMMOGRAM FOR BREAST CANCER: ICD-10-CM

## 2024-03-02 PROCEDURE — 77067 SCR MAMMO BI INCL CAD: CPT | Mod: TC,PO

## 2024-03-02 PROCEDURE — 77063 BREAST TOMOSYNTHESIS BI: CPT | Mod: 26,,, | Performed by: RADIOLOGY

## 2024-03-02 PROCEDURE — 77067 SCR MAMMO BI INCL CAD: CPT | Mod: 26,,, | Performed by: RADIOLOGY

## 2024-03-23 ENCOUNTER — PATIENT MESSAGE (OUTPATIENT)
Dept: ADMINISTRATIVE | Facility: OTHER | Age: 54
End: 2024-03-23
Payer: COMMERCIAL

## 2024-04-17 ENCOUNTER — PATIENT MESSAGE (OUTPATIENT)
Dept: ADMINISTRATIVE | Facility: OTHER | Age: 54
End: 2024-04-17
Payer: COMMERCIAL

## 2024-05-16 ENCOUNTER — PATIENT MESSAGE (OUTPATIENT)
Dept: ADMINISTRATIVE | Facility: OTHER | Age: 54
End: 2024-05-16
Payer: COMMERCIAL

## 2024-05-27 ENCOUNTER — LAB VISIT (OUTPATIENT)
Dept: LAB | Facility: HOSPITAL | Age: 54
End: 2024-05-27
Attending: INTERNAL MEDICINE
Payer: COMMERCIAL

## 2024-05-27 DIAGNOSIS — M05.742 RHEUMATOID ARTHRITIS INVOLVING BOTH HANDS WITH POSITIVE RHEUMATOID FACTOR: ICD-10-CM

## 2024-05-27 DIAGNOSIS — M05.741 RHEUMATOID ARTHRITIS INVOLVING BOTH HANDS WITH POSITIVE RHEUMATOID FACTOR: ICD-10-CM

## 2024-05-27 DIAGNOSIS — Z79.899 HIGH RISK MEDICATIONS (NOT ANTICOAGULANTS) LONG-TERM USE: ICD-10-CM

## 2024-05-27 DIAGNOSIS — D84.9 IMMUNOSUPPRESSION: ICD-10-CM

## 2024-05-27 LAB
ALBUMIN SERPL BCP-MCNC: 3.8 G/DL (ref 3.5–5.2)
ALP SERPL-CCNC: 28 U/L (ref 55–135)
ALT SERPL W/O P-5'-P-CCNC: 23 U/L (ref 10–44)
ANION GAP SERPL CALC-SCNC: 7 MMOL/L (ref 8–16)
AST SERPL-CCNC: 17 U/L (ref 10–40)
BASOPHILS # BLD AUTO: 0.07 K/UL (ref 0–0.2)
BASOPHILS NFR BLD: 1 % (ref 0–1.9)
BILIRUB SERPL-MCNC: 0.4 MG/DL (ref 0.1–1)
BUN SERPL-MCNC: 12 MG/DL (ref 6–20)
CALCIUM SERPL-MCNC: 9.3 MG/DL (ref 8.7–10.5)
CHLORIDE SERPL-SCNC: 100 MMOL/L (ref 95–110)
CO2 SERPL-SCNC: 26 MMOL/L (ref 23–29)
CREAT SERPL-MCNC: 0.8 MG/DL (ref 0.5–1.4)
CRP SERPL-MCNC: <0.3 MG/L (ref 0–8.2)
DIFFERENTIAL METHOD BLD: ABNORMAL
EOSINOPHIL # BLD AUTO: 0.2 K/UL (ref 0–0.5)
EOSINOPHIL NFR BLD: 3.4 % (ref 0–8)
ERYTHROCYTE [DISTWIDTH] IN BLOOD BY AUTOMATED COUNT: 13.7 % (ref 11.5–14.5)
ERYTHROCYTE [SEDIMENTATION RATE] IN BLOOD BY PHOTOMETRIC METHOD: <2 MM/HR (ref 0–36)
EST. GFR  (NO RACE VARIABLE): >60 ML/MIN/1.73 M^2
GLUCOSE SERPL-MCNC: 64 MG/DL (ref 70–110)
HCT VFR BLD AUTO: 40.2 % (ref 37–48.5)
HGB BLD-MCNC: 13.4 G/DL (ref 12–16)
IMM GRANULOCYTES # BLD AUTO: 0.02 K/UL (ref 0–0.04)
IMM GRANULOCYTES NFR BLD AUTO: 0.3 % (ref 0–0.5)
LYMPHOCYTES # BLD AUTO: 2.5 K/UL (ref 1–4.8)
LYMPHOCYTES NFR BLD: 35.8 % (ref 18–48)
MCH RBC QN AUTO: 31.2 PG (ref 27–31)
MCHC RBC AUTO-ENTMCNC: 33.3 G/DL (ref 32–36)
MCV RBC AUTO: 94 FL (ref 82–98)
MONOCYTES # BLD AUTO: 0.6 K/UL (ref 0.3–1)
MONOCYTES NFR BLD: 8.7 % (ref 4–15)
NEUTROPHILS # BLD AUTO: 3.6 K/UL (ref 1.8–7.7)
NEUTROPHILS NFR BLD: 50.8 % (ref 38–73)
NRBC BLD-RTO: 0 /100 WBC
PLATELET # BLD AUTO: 186 K/UL (ref 150–450)
PMV BLD AUTO: 11.8 FL (ref 9.2–12.9)
POTASSIUM SERPL-SCNC: 4.4 MMOL/L (ref 3.5–5.1)
PROT SERPL-MCNC: 6 G/DL (ref 6–8.4)
RBC # BLD AUTO: 4.3 M/UL (ref 4–5.4)
SODIUM SERPL-SCNC: 133 MMOL/L (ref 136–145)
WBC # BLD AUTO: 7.01 K/UL (ref 3.9–12.7)

## 2024-05-27 PROCEDURE — 86140 C-REACTIVE PROTEIN: CPT | Performed by: INTERNAL MEDICINE

## 2024-05-27 PROCEDURE — 85652 RBC SED RATE AUTOMATED: CPT | Performed by: INTERNAL MEDICINE

## 2024-05-27 PROCEDURE — 36415 COLL VENOUS BLD VENIPUNCTURE: CPT | Mod: PO | Performed by: INTERNAL MEDICINE

## 2024-05-27 PROCEDURE — 80053 COMPREHEN METABOLIC PANEL: CPT | Performed by: INTERNAL MEDICINE

## 2024-05-27 PROCEDURE — 85025 COMPLETE CBC W/AUTO DIFF WBC: CPT | Performed by: INTERNAL MEDICINE

## 2024-05-30 ENCOUNTER — OFFICE VISIT (OUTPATIENT)
Dept: RHEUMATOLOGY | Facility: CLINIC | Age: 54
End: 2024-05-30
Payer: COMMERCIAL

## 2024-05-30 VITALS
DIASTOLIC BLOOD PRESSURE: 71 MMHG | WEIGHT: 137.44 LBS | BODY MASS INDEX: 25.29 KG/M2 | SYSTOLIC BLOOD PRESSURE: 116 MMHG | HEART RATE: 75 BPM | HEIGHT: 62 IN

## 2024-05-30 DIAGNOSIS — M05.742 RHEUMATOID ARTHRITIS INVOLVING BOTH HANDS WITH POSITIVE RHEUMATOID FACTOR: ICD-10-CM

## 2024-05-30 DIAGNOSIS — R53.81 MALAISE AND FATIGUE: ICD-10-CM

## 2024-05-30 DIAGNOSIS — Z79.899 HIGH RISK MEDICATIONS (NOT ANTICOAGULANTS) LONG-TERM USE: ICD-10-CM

## 2024-05-30 DIAGNOSIS — D84.9 IMMUNOSUPPRESSION: ICD-10-CM

## 2024-05-30 DIAGNOSIS — G47.00 INSOMNIA, UNSPECIFIED TYPE: ICD-10-CM

## 2024-05-30 DIAGNOSIS — D84.821 IMMUNOSUPPRESSION DUE TO DRUG THERAPY: ICD-10-CM

## 2024-05-30 DIAGNOSIS — R53.83 MALAISE AND FATIGUE: ICD-10-CM

## 2024-05-30 DIAGNOSIS — M05.741 RHEUMATOID ARTHRITIS INVOLVING BOTH HANDS WITH POSITIVE RHEUMATOID FACTOR: ICD-10-CM

## 2024-05-30 DIAGNOSIS — M06.9 RHEUMATOID ARTHRITIS, INVOLVING UNSPECIFIED SITE, UNSPECIFIED WHETHER RHEUMATOID FACTOR PRESENT: Primary | ICD-10-CM

## 2024-05-30 DIAGNOSIS — Z79.899 IMMUNOSUPPRESSION DUE TO DRUG THERAPY: ICD-10-CM

## 2024-05-30 PROCEDURE — 3074F SYST BP LT 130 MM HG: CPT | Mod: CPTII,S$GLB,, | Performed by: INTERNAL MEDICINE

## 2024-05-30 PROCEDURE — 3008F BODY MASS INDEX DOCD: CPT | Mod: CPTII,S$GLB,, | Performed by: INTERNAL MEDICINE

## 2024-05-30 PROCEDURE — 1160F RVW MEDS BY RX/DR IN RCRD: CPT | Mod: CPTII,S$GLB,, | Performed by: INTERNAL MEDICINE

## 2024-05-30 PROCEDURE — 3078F DIAST BP <80 MM HG: CPT | Mod: CPTII,S$GLB,, | Performed by: INTERNAL MEDICINE

## 2024-05-30 PROCEDURE — 4010F ACE/ARB THERAPY RXD/TAKEN: CPT | Mod: CPTII,S$GLB,, | Performed by: INTERNAL MEDICINE

## 2024-05-30 PROCEDURE — 99999 PR PBB SHADOW E&M-EST. PATIENT-LVL III: CPT | Mod: PBBFAC,,, | Performed by: INTERNAL MEDICINE

## 2024-05-30 PROCEDURE — 1159F MED LIST DOCD IN RCRD: CPT | Mod: CPTII,S$GLB,, | Performed by: INTERNAL MEDICINE

## 2024-05-30 PROCEDURE — 3044F HG A1C LEVEL LT 7.0%: CPT | Mod: CPTII,S$GLB,, | Performed by: INTERNAL MEDICINE

## 2024-05-30 PROCEDURE — 99215 OFFICE O/P EST HI 40 MIN: CPT | Mod: S$GLB,,, | Performed by: INTERNAL MEDICINE

## 2024-05-30 RX ORDER — HYDROXYCHLOROQUINE SULFATE 200 MG/1
200 TABLET, FILM COATED ORAL 2 TIMES DAILY
Qty: 60 TABLET | Refills: 3 | Status: SHIPPED | OUTPATIENT
Start: 2024-05-30

## 2024-05-30 RX ORDER — TRAMADOL HYDROCHLORIDE 50 MG/1
TABLET ORAL
Qty: 180 TABLET | Refills: 2 | Status: SHIPPED | OUTPATIENT
Start: 2024-05-30

## 2024-05-30 RX ORDER — FOLIC ACID 1 MG/1
1000 TABLET ORAL DAILY
Qty: 90 TABLET | Refills: 3 | Status: SHIPPED | OUTPATIENT
Start: 2024-05-30 | End: 2025-05-30

## 2024-05-30 RX ORDER — ATORVASTATIN CALCIUM 20 MG/1
20 TABLET, FILM COATED ORAL
COMMUNITY
Start: 2024-04-03 | End: 2024-09-30

## 2024-05-30 RX ORDER — METHOTREXATE 25 MG/ML
20 INJECTION, SOLUTION INTRA-ARTERIAL; INTRAMUSCULAR; INTRAVENOUS WEEKLY
Qty: 6 ML | Refills: 3 | Status: SHIPPED | OUTPATIENT
Start: 2024-05-30 | End: 2031-05-23

## 2024-05-30 NOTE — PROGRESS NOTES
Subjective:          Chief Complaint: Annita Bettencourt is a 53 y.o. female who had concerns including Disease Management.    HPI:  Patient is a 53-year-old female here for consultation f/u sero+ high titer RA  Hands at MCP and wrist.   Iritis in 2022 treated with topical gtt,   Changed to Cimzia 3/2022  [facility] (Dr. Goldstein). ? If this is related to RA. Did have very dry eyes on Systane BID at minimum. She completed OCT scan 2023. No prior hx of iritis.     5/2024:     10/2023: patient doing well Rapid 3: 0.5  Current: Cimzia , HCQ 200mg BID, MTX 0.6mL SQ weekly, folic acid.   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   She is walking few miles few times weekly.   She has lost 82#, Feeling markedly better! Mounjaro since October 22 with diet and exercise.       She is currently on  Cimzia (3/2022-present)   MTX  sq  25 mg once weekly (cao from oral 12/2022 for nausea)   hydroxychloroquine 200 mgBID (since consultation as previously on once daily)   ibuprofen 800 mg t.i.d. p.r.n..    Tramadol 100mg TID PRN. - rare  Predniosne PRN has not used in nearly 1 year.   She has previously been on varying doses of prednisone PRN flares approx 1-2 monthly (50mg, 40mg, 30mg, 20mg and 10mg etc).     Previous medication:   Humira- congestion/HA .  Enbrel lost efficacy, iritis and URI symptoms returned.       +AM stiffness <30min. Joint improve with activity.   The patient was diagnosed 5+  years ago    She has chronic dry eyes using systane. + dry mouth-  Patient currently has pain in her right shoulder right wrist of rates his pain at 2/10.    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 No MI  Still with sleep difficulty: Flexeril did not help. Doxepin 10mg not helping. Restless with sleep latency, stays asleep once down.    Component      Latest Ref Rng & Units 8/7/2018   NIL      See text IU/mL 0.050   TB1 - Nil      See text IU/mL -0.030   TB2 - Nil       See text IU/mL -0.030   Mitogen - Nil      See text IU/mL 8.140   TB Gold Plus       Negative   Hepatitis B Surface Ag       Negative   Hep B C IgM       Negative   Hep A IgM       Negative   Hepatitis C Ab       Negative   Rheumatoid Factor      0.0 - 15.0 IU/mL 397.0 (H)   CCP Antibodies      <5.0 U/mL 160.5 (H)   Sed Rate      0 - 20 mm/Hr 9   CRP      0.0 - 8.2 mg/L 1.9       REVIEW OF SYSTEMS:    Review of Systems   Constitutional:  Negative for fever.   Eyes:  Negative for redness.   Respiratory:  Negative for cough and shortness of breath.    Cardiovascular:  Negative for chest pain.   Gastrointestinal:  Negative for constipation and diarrhea.   Genitourinary:  Negative for dysuria.   Skin:  Negative for rash.   Neurological:  Negative for headaches.   Endo/Heme/Allergies:  Does not bruise/bleed easily.               Objective:            Past Medical History:   Diagnosis Date    Acid reflux     Anxiety     Depression     Diabetes mellitus     Enlarged heart     Hypertension     Murmur     Rheumatoid arthritis      Family History   Problem Relation Name Age of Onset    Hypertension Father      Breast cancer Maternal Aunt      Breast cancer Maternal Grandmother       Social History     Tobacco Use    Smoking status: Every Day     Current packs/day: 0.25     Average packs/day: 0.3 packs/day for 25.0 years (6.3 ttl pk-yrs)     Types: Cigarettes    Smokeless tobacco: Never   Substance Use Topics    Alcohol use: Yes     Comment: socially    Drug use: No         Current Outpatient Medications on File Prior to Visit   Medication Sig Dispense Refill    atorvastatin (LIPITOR) 20 MG tablet Take 20 mg by mouth.      certolizumab pegol (CIMZIA) 400 mg/2 mL (200 mg/mL x 2) SyKt Inject 1 mL (200 mg total) into the skin every 14 (fourteen) days. 1 each 11    esomeprazole magnesium 20 mg TbEC Take 20 mg by mouth once daily.      FLUoxetine 40 MG capsule Take 40 mg by mouth once daily.      folic acid (FOLVITE) 1 MG tablet  "Take 1 tablet (1,000 mcg total) by mouth once daily. 90 tablet 3    hydroxychloroquine (PLAQUENIL) 200 mg tablet Take 1 tablet (200 mg total) by mouth 2 (two) times daily. 60 tablet 3    methotrexate 25 mg/mL injection Inject 0.8 mLs (20 mg total) into the muscle once a week. To be taken once weekly with daily folic acid for 365 doses 6 mL 3    ondansetron (ZOFRAN) 4 MG tablet Take 4 mg by mouth every 8 (eight) hours as needed.      syringe with needle, safety (MONOJECT TB SAFETY SYRINGE) 1 mL 28 gauge x 1/2" Syrg 1 application  by Misc.(Non-Drug; Combo Route) route once a week. 50 each 2    tirzepatide (MOUNJARO SUBQ) Inject into the skin.      traMADoL (ULTRAM) 50 mg tablet TAKE 1 OR 2 TABLETS BY MOUTH EVERY 8 HOURS AS NEEDED FOR PAIN 180 tablet 2    traZODone (DESYREL) 50 MG tablet TAKE 1 OR 2 TABLETS BY MOUTH EVERY EVENING 60 tablet 11    losartan (COZAAR) 50 MG tablet Take 50 mg by mouth once daily.      nystatin (MYCOSTATIN) ointment 1 application 2 (two) times daily.      pravastatin (PRAVACHOL) 10 MG tablet Take 10 mg by mouth every evening. (Patient not taking: Reported on 5/30/2024)  5     No current facility-administered medications on file prior to visit.       Vitals:    05/30/24 1320   BP: 116/71   Pulse: 75         Physical Exam:    Physical Exam  Constitutional:       General: She is not in acute distress.     Appearance: She is well-developed.   Eyes:      General: Lids are normal.   Neurological:      Mental Status: She is alert and oriented to person, place, and time.   Psychiatric:         Behavior: Behavior normal.         Thought Content: Thought content normal.         Assessment:       Encounter Diagnoses   Name Primary?    Rheumatoid arthritis, involving unspecified site, unspecified whether rheumatoid factor present Yes    High risk medications (not anticoagulants) long-term use     Immunosuppression due to drug therapy     Rheumatoid arthritis involving both hands with positive rheumatoid " factor     Immunosuppression     Malaise and fatigue     Insomnia, unspecified type              Plan:        Rheumatoid arthritis, involving unspecified site, unspecified whether rheumatoid factor present  -     ALT (SGPT); Future; Expected date: 05/30/2024  -     AST (SGOT); Future; Expected date: 05/30/2024  -     CBC Auto Differential; Future; Expected date: 05/30/2024  -     C-Reactive Protein; Future; Expected date: 05/30/2024  -     Creatinine, Serum; Future; Expected date: 05/30/2024  -     Sedimentation rate; Future; Expected date: 05/30/2024    High risk medications (not anticoagulants) long-term use  -     ALT (SGPT); Future; Expected date: 05/30/2024  -     AST (SGOT); Future; Expected date: 05/30/2024  -     CBC Auto Differential; Future; Expected date: 05/30/2024  -     C-Reactive Protein; Future; Expected date: 05/30/2024  -     Creatinine, Serum; Future; Expected date: 05/30/2024  -     Sedimentation rate; Future; Expected date: 05/30/2024    Immunosuppression due to drug therapy  -     ALT (SGPT); Future; Expected date: 05/30/2024  -     AST (SGOT); Future; Expected date: 05/30/2024  -     CBC Auto Differential; Future; Expected date: 05/30/2024  -     C-Reactive Protein; Future; Expected date: 05/30/2024  -     Creatinine, Serum; Future; Expected date: 05/30/2024  -     Sedimentation rate; Future; Expected date: 05/30/2024    Rheumatoid arthritis involving both hands with positive rheumatoid factor  -     folic acid (FOLVITE) 1 MG tablet; Take 1 tablet (1,000 mcg total) by mouth once daily.  Dispense: 90 tablet; Refill: 3  -     hydroxychloroquine (PLAQUENIL) 200 mg tablet; Take 1 tablet (200 mg total) by mouth 2 (two) times daily.  Dispense: 60 tablet; Refill: 3  -     methotrexate 25 mg/mL injection; Inject 0.8 mLs (20 mg total) into the muscle once a week. To be taken once weekly with daily folic acid for 365 doses  Dispense: 6 mL; Refill: 3  -     traMADoL (ULTRAM) 50 mg tablet; TAKE 1 OR 2  TABLETS BY MOUTH EVERY 8 HOURS AS NEEDED FOR PAIN  Dispense: 180 tablet; Refill: 2    Immunosuppression  -     folic acid (FOLVITE) 1 MG tablet; Take 1 tablet (1,000 mcg total) by mouth once daily.  Dispense: 90 tablet; Refill: 3  -     hydroxychloroquine (PLAQUENIL) 200 mg tablet; Take 1 tablet (200 mg total) by mouth 2 (two) times daily.  Dispense: 60 tablet; Refill: 3  -     methotrexate 25 mg/mL injection; Inject 0.8 mLs (20 mg total) into the muscle once a week. To be taken once weekly with daily folic acid for 365 doses  Dispense: 6 mL; Refill: 3    Malaise and fatigue  -     folic acid (FOLVITE) 1 MG tablet; Take 1 tablet (1,000 mcg total) by mouth once daily.  Dispense: 90 tablet; Refill: 3  -     hydroxychloroquine (PLAQUENIL) 200 mg tablet; Take 1 tablet (200 mg total) by mouth 2 (two) times daily.  Dispense: 60 tablet; Refill: 3  -     methotrexate 25 mg/mL injection; Inject 0.8 mLs (20 mg total) into the muscle once a week. To be taken once weekly with daily folic acid for 365 doses  Dispense: 6 mL; Refill: 3    Insomnia, unspecified type        Patient is a 52-year-old female with rheumatoid arthritis     Ok for prednisone if flares as you have done in past.   Failed Humira with URI/HA  Cimzia. Start 3/14/22 doing very well. 200mg SQ every 2 weeks. Refilled to [facility] for PA   MTX 0.6mL every week   HCQ 200mg BID-UTD as of 2022     Patient is aware of the risks benefits side effects and monitoring requirements of biologic therapy including but not limited to disseminated tuberculosis recurrent serious infections suppression of the immune system, injection site reactions.  F/u 4 months.     No follow-ups on file.      40min consultation with greater than 50% of that time included Preparing to see the patient (review records, tests), Obtaining and/or reviewing separately obtained historical data, Performing a medically appropriate examination and/or evaluation , Ordering medications, tests, and/or procedures,  Referring and communicating with other healthcare professionals , Documenting clinical information in the electronic or other health record and Independently interpreting results  (as warranted) & communicating results to the patient/family/caregiver. All questions answered.

## 2024-07-16 ENCOUNTER — DOCUMENTATION ONLY (OUTPATIENT)
Dept: RHEUMATOLOGY | Facility: CLINIC | Age: 54
End: 2024-07-16
Payer: COMMERCIAL

## 2024-08-18 ENCOUNTER — PATIENT MESSAGE (OUTPATIENT)
Dept: ADMINISTRATIVE | Facility: OTHER | Age: 54
End: 2024-08-18
Payer: COMMERCIAL

## 2024-08-26 ENCOUNTER — TELEPHONE (OUTPATIENT)
Dept: RHEUMATOLOGY | Facility: CLINIC | Age: 54
End: 2024-08-26
Payer: COMMERCIAL

## 2024-08-26 NOTE — TELEPHONE ENCOUNTER
Ms Ariza c/o r elbow pain last 3 weeks. Pt states had elbow injection she believes in Jan 2024.  Patients is asking what provider would advise. She has taken Prednisone with some relief but the pain continues.  Will route to provider to review and advise.  LOV 5/2024   NOV 10/2024

## 2024-08-26 NOTE — TELEPHONE ENCOUNTER
----- Message from Ethel Winston sent at 8/26/2024  1:50 PM CDT -----  Regarding: sooner apt  Contact: patient  Type:  Sooner Appointment Request    Caller is requesting a sooner appointment.  Caller declined first available appointment listed below.  Caller will not accept being placed on the waitlist and is requesting a message be sent to doctor.    Name of Caller:  patient  When is the first available appointment?    Symptoms:  injection in arm  Would the patient rather a call back or a response via [facility]?   [facility] Call Back Number:  [phone]    Additional Information:  seeking the following arthrisis is flaring up

## 2024-08-29 ENCOUNTER — OFFICE VISIT (OUTPATIENT)
Dept: RHEUMATOLOGY | Facility: CLINIC | Age: 54
End: 2024-08-29
Payer: COMMERCIAL

## 2024-08-29 VITALS
HEART RATE: 72 BPM | DIASTOLIC BLOOD PRESSURE: 71 MMHG | WEIGHT: 145.31 LBS | SYSTOLIC BLOOD PRESSURE: 105 MMHG | HEIGHT: 62 IN | BODY MASS INDEX: 26.74 KG/M2

## 2024-08-29 DIAGNOSIS — M77.11 RIGHT LATERAL EPICONDYLITIS: Primary | ICD-10-CM

## 2024-08-29 PROCEDURE — 99999 PR PBB SHADOW E&M-EST. PATIENT-LVL III: CPT | Mod: PBBFAC,,, | Performed by: INTERNAL MEDICINE

## 2024-08-29 RX ADMIN — METHYLPREDNISOLONE ACETATE 40 MG: 40 INJECTION, SUSPENSION INTRA-ARTICULAR; INTRALESIONAL; INTRAMUSCULAR; SOFT TISSUE at 03:08

## 2024-08-29 ASSESSMENT — ROUTINE ASSESSMENT OF PATIENT INDEX DATA (RAPID3)
PAIN SCORE: 6
TOTAL RAPID3 SCORE: 3.67
FATIGUE SCORE: 1.1
PSYCHOLOGICAL DISTRESS SCORE: 2.2
PATIENT GLOBAL ASSESSMENT SCORE: 3
MDHAQ FUNCTION SCORE: 0.6

## 2024-09-08 RX ORDER — METHYLPREDNISOLONE ACETATE 40 MG/ML
40 INJECTION, SUSPENSION INTRA-ARTICULAR; INTRALESIONAL; INTRAMUSCULAR; SOFT TISSUE
Status: DISCONTINUED | OUTPATIENT
Start: 2024-08-29 | End: 2024-09-08 | Stop reason: HOSPADM

## 2024-09-09 NOTE — PROGRESS NOTES
Subjective:          Chief Complaint: Annita Bettencourt is a 54 y.o. female who had concerns including Pain.    HPI:  Patient is a 53-year-old female here for consultation f/u sero+ high titer RA  Hands at MCP and wrist.   Iritis in 2022 treated with topical gtt,   Changed to Cimzia 3/2022  [facility] (Dr. Goldstein). ? If this is related to RA. Did have very dry eyes on Systane BID at minimum. She completed OCT scan 2023. No prior hx of iritis.     5/2024:     10/2023: patient doing well Rapid 3: 0.5  Current: Cimzia , HCQ 200mg BID, MTX 0.6mL SQ weekly, folic acid.   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   She is walking few miles few times weekly.   She has lost 82#, Feeling markedly better! Mounjaro since October 22 with diet and exercise.       She is currently on  Cimzia (3/2022-present)   MTX  sq  25 mg once weekly (cao from oral 12/2022 for nausea)   hydroxychloroquine 200 mgBID (since consultation as previously on once daily)   ibuprofen 800 mg t.i.d. p.r.n..    Tramadol 100mg TID PRN. - rare  Predniosne PRN has not used in nearly 1 year.   She has previously been on varying doses of prednisone PRN flares approx 1-2 monthly (50mg, 40mg, 30mg, 20mg and 10mg etc).     Previous medication:   Humira- congestion/HA .  Enbrel lost efficacy, iritis and URI symptoms returned.       +AM stiffness <30min. Joint improve with activity.   The patient was diagnosed 5+  years ago    She has chronic dry eyes using systane. + dry mouth-  Patient currently has pain in her right shoulder right wrist of rates his pain at 2/10.    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 No MI  Still with sleep difficulty: Flexeril did not help. Doxepin 10mg not helping. Restless with sleep latency, stays asleep once down.    Component      Latest Ref Rng & Units 8/7/2018   NIL      See text IU/mL 0.050   TB1 - Nil      See text IU/mL -0.030   TB2 - Nil      See text IU/mL  -0.030   Mitogen - Nil      See text IU/mL 8.140   TB Gold Plus       Negative   Hepatitis B Surface Ag       Negative   Hep B C IgM       Negative   Hep A IgM       Negative   Hepatitis C Ab       Negative   Rheumatoid Factor      0.0 - 15.0 IU/mL 397.0 (H)   CCP Antibodies      <5.0 U/mL 160.5 (H)   Sed Rate      0 - 20 mm/Hr 9   CRP      0.0 - 8.2 mg/L 1.9       REVIEW OF SYSTEMS:    Review of Systems   Constitutional:  Negative for fever.   Eyes:  Negative for redness.   Respiratory:  Negative for cough and shortness of breath.    Cardiovascular:  Negative for chest pain.   Gastrointestinal:  Negative for constipation and diarrhea.   Genitourinary:  Negative for dysuria.   Skin:  Negative for rash.   Neurological:  Negative for headaches.   Endo/Heme/Allergies:  Does not bruise/bleed easily.               Objective:            Past Medical History:   Diagnosis Date    Acid reflux     Anxiety     Depression     Diabetes mellitus     Enlarged heart     Hypertension     Murmur     Rheumatoid arthritis      Family History   Problem Relation Name Age of Onset    Hypertension Father      Breast cancer Maternal Aunt      Breast cancer Maternal Grandmother       Social History     Tobacco Use    Smoking status: Every Day     Current packs/day: 0.25     Average packs/day: 0.3 packs/day for 25.0 years (6.3 ttl pk-yrs)     Types: Cigarettes    Smokeless tobacco: Never   Substance Use Topics    Alcohol use: Yes     Comment: socially    Drug use: No         Current Outpatient Medications on File Prior to Visit   Medication Sig Dispense Refill    atorvastatin (LIPITOR) 20 MG tablet Take 20 mg by mouth.      certolizumab pegol (CIMZIA) 400 mg/2 mL (200 mg/mL x 2) SyKt Inject 1 mL (200 mg total) into the skin every 14 (fourteen) days. 1 each 11    esomeprazole magnesium 20 mg TbEC Take 20 mg by mouth once daily.      FLUoxetine 40 MG capsule Take 40 mg by mouth once daily.      folic acid (FOLVITE) 1 MG tablet Take 1 tablet  "(1,000 mcg total) by mouth once daily. 90 tablet 3    hydroxychloroquine (PLAQUENIL) 200 mg tablet Take 1 tablet (200 mg total) by mouth 2 (two) times daily. 60 tablet 3    losartan (COZAAR) 50 MG tablet Take 50 mg by mouth once daily.      methotrexate 25 mg/mL injection Inject 0.8 mLs (20 mg total) into the muscle once a week. To be taken once weekly with daily folic acid for 365 doses 6 mL 3    nystatin (MYCOSTATIN) ointment 1 application 2 (two) times daily.      ondansetron (ZOFRAN) 4 MG tablet Take 4 mg by mouth every 8 (eight) hours as needed.      syringe with needle, safety (MONOJECT TB SAFETY SYRINGE) 1 mL 28 gauge x 1/2" Syrg 1 application  by Misc.(Non-Drug; Combo Route) route once a week. 50 each 2    tirzepatide (MOUNJARO SUBQ) Inject into the skin.      traMADoL (ULTRAM) 50 mg tablet TAKE 1 OR 2 TABLETS BY MOUTH EVERY 8 HOURS AS NEEDED FOR PAIN 180 tablet 2    traZODone (DESYREL) 50 MG tablet TAKE 1 OR 2 TABLETS BY MOUTH EVERY EVENING 60 tablet 11    pravastatin (PRAVACHOL) 10 MG tablet Take 10 mg by mouth every evening. (Patient not taking: Reported on 5/30/2024)  5     No current facility-administered medications on file prior to visit.       Vitals:    08/29/24 1547   BP: 105/71   Pulse: 72         Physical Exam:    Physical Exam  Constitutional:       General: She is not in acute distress.     Appearance: She is well-developed.   Eyes:      General: Lids are normal.   Neurological:      Mental Status: She is alert and oriented to person, place, and time.   Psychiatric:         Behavior: Behavior normal.         Thought Content: Thought content normal.           Assessment:       Encounter Diagnosis   Name Primary?    Right lateral epicondylitis Yes             Plan:        Right lateral epicondylitis  -     Tendon Origin: R elbow        Patient is a 54-year-old female with rheumatoid arthritis     Ok for prednisone if flares as you have done in past.   Failed Humira with URI/HA  Cimzia. Start " 3/14/22 doing very well. 200mg SQ every 2 weeks. Refilled to [facility] for PA   MTX 0.6mL every week   HCQ 200mg BID-UTD as of 2022     Patient is aware of the risks benefits side effects and monitoring requirements of biologic therapy including but not limited to disseminated tuberculosis recurrent serious infections suppression of the immune system, injection site reactions.    Injected right lateral epicondyle.     Patient with 3 new biopsies- received call whilel in appt that +melanoma Dr. Lopez office (Dr. Warren I believe) will call when she has more infor as we will need to rething biologics if melanoma.   F/u 4 months.     No follow-ups on file.      4

## 2024-09-09 NOTE — PROCEDURES
Tendon Origin: R elbow    Date/Time: 8/29/2024 3:30 PM    Performed by: Agnieszka Schwab,   Authorized by: Agnieszka Schwab, DO    Consent Done?:  Yes (Verbal)  Timeout: prior to procedure the correct patient, procedure, and site was verified    Indications:  Arthritis and pain  Timeout: prior to procedure the correct patient, procedure, and site was verified    Location:  Elbow  Site:  R elbow  Ultrasonic Guidance for Needle Placement?: Yes    Needle size:  25 G  Approach:  Dorsal  Medications:  40 mg methylPREDNISolone acetate 40 mg/mL

## 2024-10-09 ENCOUNTER — LAB VISIT (OUTPATIENT)
Dept: LAB | Facility: HOSPITAL | Age: 54
End: 2024-10-09
Attending: INTERNAL MEDICINE
Payer: COMMERCIAL

## 2024-10-09 DIAGNOSIS — M05.742 RHEUMATOID ARTHRITIS INVOLVING BOTH HANDS WITH POSITIVE RHEUMATOID FACTOR: ICD-10-CM

## 2024-10-09 DIAGNOSIS — D84.9 IMMUNOSUPPRESSION: ICD-10-CM

## 2024-10-09 DIAGNOSIS — D84.821 IMMUNOSUPPRESSION DUE TO DRUG THERAPY: ICD-10-CM

## 2024-10-09 DIAGNOSIS — M05.741 RHEUMATOID ARTHRITIS INVOLVING BOTH HANDS WITH POSITIVE RHEUMATOID FACTOR: ICD-10-CM

## 2024-10-09 DIAGNOSIS — Z79.899 IMMUNOSUPPRESSION DUE TO DRUG THERAPY: ICD-10-CM

## 2024-10-09 DIAGNOSIS — Z79.899 HIGH RISK MEDICATIONS (NOT ANTICOAGULANTS) LONG-TERM USE: ICD-10-CM

## 2024-10-09 DIAGNOSIS — M06.9 RHEUMATOID ARTHRITIS, INVOLVING UNSPECIFIED SITE, UNSPECIFIED WHETHER RHEUMATOID FACTOR PRESENT: ICD-10-CM

## 2024-10-09 LAB
ALBUMIN SERPL BCP-MCNC: 4.4 G/DL (ref 3.5–5.2)
ALP SERPL-CCNC: 34 U/L (ref 55–135)
ALT SERPL W/O P-5'-P-CCNC: 27 U/L (ref 10–44)
ALT SERPL W/O P-5'-P-CCNC: 27 U/L (ref 10–44)
ANION GAP SERPL CALC-SCNC: 8 MMOL/L (ref 8–16)
AST SERPL-CCNC: 21 U/L (ref 10–40)
AST SERPL-CCNC: 21 U/L (ref 10–40)
BASOPHILS # BLD AUTO: 0.1 K/UL (ref 0–0.2)
BASOPHILS NFR BLD: 1.4 % (ref 0–1.9)
BILIRUB SERPL-MCNC: 0.4 MG/DL (ref 0.1–1)
BUN SERPL-MCNC: 15 MG/DL (ref 6–20)
CALCIUM SERPL-MCNC: 10.2 MG/DL (ref 8.7–10.5)
CHLORIDE SERPL-SCNC: 105 MMOL/L (ref 95–110)
CO2 SERPL-SCNC: 27 MMOL/L (ref 23–29)
CREAT SERPL-MCNC: 0.9 MG/DL (ref 0.5–1.4)
CREAT SERPL-MCNC: 0.9 MG/DL (ref 0.5–1.4)
CRP SERPL-MCNC: <0.3 MG/L (ref 0–8.2)
DIFFERENTIAL METHOD BLD: NORMAL
EOSINOPHIL # BLD AUTO: 0.3 K/UL (ref 0–0.5)
EOSINOPHIL NFR BLD: 4.5 % (ref 0–8)
ERYTHROCYTE [DISTWIDTH] IN BLOOD BY AUTOMATED COUNT: 13.9 % (ref 11.5–14.5)
ERYTHROCYTE [SEDIMENTATION RATE] IN BLOOD BY PHOTOMETRIC METHOD: 4 MM/HR (ref 0–36)
EST. GFR  (NO RACE VARIABLE): >60 ML/MIN/1.73 M^2
EST. GFR  (NO RACE VARIABLE): >60 ML/MIN/1.73 M^2
GLUCOSE SERPL-MCNC: 91 MG/DL (ref 70–110)
HCT VFR BLD AUTO: 45.6 % (ref 37–48.5)
HGB BLD-MCNC: 15.4 G/DL (ref 12–16)
IMM GRANULOCYTES # BLD AUTO: 0.02 K/UL (ref 0–0.04)
IMM GRANULOCYTES NFR BLD AUTO: 0.3 % (ref 0–0.5)
LYMPHOCYTES # BLD AUTO: 2.5 K/UL (ref 1–4.8)
LYMPHOCYTES NFR BLD: 35.6 % (ref 18–48)
MCH RBC QN AUTO: 31 PG (ref 27–31)
MCHC RBC AUTO-ENTMCNC: 33.8 G/DL (ref 32–36)
MCV RBC AUTO: 92 FL (ref 82–98)
MONOCYTES # BLD AUTO: 0.6 K/UL (ref 0.3–1)
MONOCYTES NFR BLD: 7.9 % (ref 4–15)
NEUTROPHILS # BLD AUTO: 3.5 K/UL (ref 1.8–7.7)
NEUTROPHILS NFR BLD: 50.3 % (ref 38–73)
NRBC BLD-RTO: 0 /100 WBC
PLATELET # BLD AUTO: 197 K/UL (ref 150–450)
PMV BLD AUTO: 11.7 FL (ref 9.2–12.9)
POTASSIUM SERPL-SCNC: 4.8 MMOL/L (ref 3.5–5.1)
PROT SERPL-MCNC: 7.2 G/DL (ref 6–8.4)
RBC # BLD AUTO: 4.97 M/UL (ref 4–5.4)
SODIUM SERPL-SCNC: 140 MMOL/L (ref 136–145)
WBC # BLD AUTO: 6.96 K/UL (ref 3.9–12.7)

## 2024-10-09 PROCEDURE — 36415 COLL VENOUS BLD VENIPUNCTURE: CPT | Mod: PO | Performed by: INTERNAL MEDICINE

## 2024-10-09 PROCEDURE — 80053 COMPREHEN METABOLIC PANEL: CPT | Performed by: INTERNAL MEDICINE

## 2024-10-09 PROCEDURE — 86140 C-REACTIVE PROTEIN: CPT | Performed by: INTERNAL MEDICINE

## 2024-10-09 PROCEDURE — 85652 RBC SED RATE AUTOMATED: CPT | Performed by: INTERNAL MEDICINE

## 2024-10-09 PROCEDURE — 85025 COMPLETE CBC W/AUTO DIFF WBC: CPT | Performed by: INTERNAL MEDICINE

## 2024-10-13 ENCOUNTER — PATIENT MESSAGE (OUTPATIENT)
Dept: ADMINISTRATIVE | Facility: OTHER | Age: 54
End: 2024-10-13
Payer: COMMERCIAL

## 2024-10-17 ENCOUNTER — OFFICE VISIT (OUTPATIENT)
Dept: RHEUMATOLOGY | Facility: CLINIC | Age: 54
End: 2024-10-17
Payer: COMMERCIAL

## 2024-10-17 VITALS
DIASTOLIC BLOOD PRESSURE: 73 MMHG | HEIGHT: 62 IN | HEART RATE: 72 BPM | SYSTOLIC BLOOD PRESSURE: 133 MMHG | BODY MASS INDEX: 27.83 KG/M2 | WEIGHT: 151.25 LBS

## 2024-10-17 DIAGNOSIS — R53.83 MALAISE AND FATIGUE: ICD-10-CM

## 2024-10-17 DIAGNOSIS — R53.81 MALAISE AND FATIGUE: ICD-10-CM

## 2024-10-17 DIAGNOSIS — M06.9 RHEUMATOID ARTHRITIS, INVOLVING UNSPECIFIED SITE, UNSPECIFIED WHETHER RHEUMATOID FACTOR PRESENT: Primary | ICD-10-CM

## 2024-10-17 DIAGNOSIS — M05.742 RHEUMATOID ARTHRITIS INVOLVING BOTH HANDS WITH POSITIVE RHEUMATOID FACTOR: ICD-10-CM

## 2024-10-17 DIAGNOSIS — M05.741 RHEUMATOID ARTHRITIS INVOLVING BOTH HANDS WITH POSITIVE RHEUMATOID FACTOR: ICD-10-CM

## 2024-10-17 DIAGNOSIS — D84.9 IMMUNOSUPPRESSION: ICD-10-CM

## 2024-10-17 PROCEDURE — 4010F ACE/ARB THERAPY RXD/TAKEN: CPT | Mod: CPTII,S$GLB,, | Performed by: INTERNAL MEDICINE

## 2024-10-17 PROCEDURE — 3075F SYST BP GE 130 - 139MM HG: CPT | Mod: CPTII,S$GLB,, | Performed by: INTERNAL MEDICINE

## 2024-10-17 PROCEDURE — 99215 OFFICE O/P EST HI 40 MIN: CPT | Mod: S$GLB,,, | Performed by: INTERNAL MEDICINE

## 2024-10-17 PROCEDURE — 1159F MED LIST DOCD IN RCRD: CPT | Mod: CPTII,S$GLB,, | Performed by: INTERNAL MEDICINE

## 2024-10-17 PROCEDURE — 3008F BODY MASS INDEX DOCD: CPT | Mod: CPTII,S$GLB,, | Performed by: INTERNAL MEDICINE

## 2024-10-17 PROCEDURE — 3078F DIAST BP <80 MM HG: CPT | Mod: CPTII,S$GLB,, | Performed by: INTERNAL MEDICINE

## 2024-10-17 PROCEDURE — 99999 PR PBB SHADOW E&M-EST. PATIENT-LVL III: CPT | Mod: PBBFAC,,, | Performed by: INTERNAL MEDICINE

## 2024-10-17 PROCEDURE — 3044F HG A1C LEVEL LT 7.0%: CPT | Mod: CPTII,S$GLB,, | Performed by: INTERNAL MEDICINE

## 2024-10-17 RX ORDER — METHOTREXATE 25 MG/ML
20 INJECTION, SOLUTION INTRA-ARTERIAL; INTRAMUSCULAR; INTRAVENOUS WEEKLY
Qty: 6 ML | Refills: 3 | Status: SHIPPED | OUTPATIENT
Start: 2024-10-17 | End: 2031-10-10

## 2024-10-17 RX ORDER — HYDROXYCHLOROQUINE SULFATE 200 MG/1
200 TABLET, FILM COATED ORAL 2 TIMES DAILY
Qty: 60 TABLET | Refills: 3 | Status: SHIPPED | OUTPATIENT
Start: 2024-10-17

## 2024-10-17 RX ORDER — FOLIC ACID 1 MG/1
1000 TABLET ORAL DAILY
Qty: 90 TABLET | Refills: 3 | Status: SHIPPED | OUTPATIENT
Start: 2024-10-17 | End: 2025-10-17

## 2024-10-17 ASSESSMENT — ROUTINE ASSESSMENT OF PATIENT INDEX DATA (RAPID3)
PATIENT GLOBAL ASSESSMENT SCORE: 2
FATIGUE SCORE: 1.1
TOTAL RAPID3 SCORE: 0.67
PSYCHOLOGICAL DISTRESS SCORE: 2.2
MDHAQ FUNCTION SCORE: 0
PAIN SCORE: 0

## 2024-10-17 NOTE — PROGRESS NOTES
Subjective:          Chief Complaint: Annita Bettencourt is a 54 y.o. female who had concerns including Disease Management.    HPI:  Patient is a 54-year-old female here for consultation f/u sero+ high titer RA  Hands at MCP and wrist.   Iritis in 2022 treated with topical gtt,   Changed to Cimzia 3/2022  [facility] (Dr. Goldstein). ? If this is related to RA. Did have very dry eyes on Systane BID at minimum. She completed OCT scan 2023. No prior hx of iritis.     10/2024: patient returns today we did send messages on portal for her to DC cimzia but she has not read these. She is permanent DC for all TNFi with dx of melanoma until I can speak with Derm about plans going forward. Continue MTX.     8/29/2024: patient received call prior to this appt with +melanoma.     10/2023: patient doing well Rapid 3: 0.5  Current: Cimzia , HCQ 200mg BID, MTX 0.6mL SQ weekly, folic acid.   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   She is walking few miles few times weekly.   She has lost 82#, Feeling markedly better! Mounjaro since October 22 with diet and exercise.       She is currently on  Cimzia (3/2022-present)   MTX  sq  25 mg once weekly (cao from oral 12/2022 for nausea)   hydroxychloroquine 200 mgBID (since consultation as previously on once daily)   ibuprofen 800 mg t.i.d. p.r.n..    Tramadol 100mg TID PRN. - rare  Predniosne PRN has not used in nearly 1 year.   She has previously been on varying doses of prednisone PRN flares approx 1-2 monthly (50mg, 40mg, 30mg, 20mg and 10mg etc).     Previous medication:   Humira- congestion/HA .  Enbrel lost efficacy, iritis and URI symptoms returned.       +AM stiffness <30min. Joint improve with activity.   The patient was diagnosed 5+  years ago    She has chronic dry eyes using systane. + dry mouth-  Patient currently has pain in her right shoulder right wrist of rates his pain at 2/10.    Pregnancy induced cardiomyopathy 17 yrs  ago but no stenting. No MI  Still with sleep difficulty: Flexeril did not help. Doxepin 10mg not helping. Restless with sleep latency, stays asleep once down.    Component      Latest Ref Rng & Units 8/7/2018   NIL      See text IU/mL 0.050   TB1 - Nil      See text IU/mL -0.030   TB2 - Nil      See text IU/mL -0.030   Mitogen - Nil      See text IU/mL 8.140   TB Gold Plus       Negative   Hepatitis B Surface Ag       Negative   Hep B C IgM       Negative   Hep A IgM       Negative   Hepatitis C Ab       Negative   Rheumatoid Factor      0.0 - 15.0 IU/mL 397.0 (H)   CCP Antibodies      <5.0 U/mL 160.5 (H)   Sed Rate      0 - 20 mm/Hr 9   CRP      0.0 - 8.2 mg/L 1.9       REVIEW OF SYSTEMS:    Review of Systems   Constitutional:  Negative for fever.   Eyes:  Negative for redness.   Respiratory:  Negative for cough and shortness of breath.    Cardiovascular:  Negative for chest pain.   Gastrointestinal:  Negative for constipation and diarrhea.   Genitourinary:  Negative for dysuria.   Skin:  Negative for rash.   Neurological:  Negative for headaches.   Endo/Heme/Allergies:  Does not bruise/bleed easily.               Objective:            Past Medical History:   Diagnosis Date    Acid reflux     Anxiety     Depression     Diabetes mellitus     Enlarged heart     Hypertension     Murmur     Rheumatoid arthritis      Family History   Problem Relation Name Age of Onset    Hypertension Father      Breast cancer Maternal Aunt      Breast cancer Maternal Grandmother       Social History     Tobacco Use    Smoking status: Every Day     Current packs/day: 0.25     Average packs/day: 0.3 packs/day for 25.0 years (6.3 ttl pk-yrs)     Types: Cigarettes    Smokeless tobacco: Never   Substance Use Topics    Alcohol use: Yes     Comment: socially    Drug use: No         Current Outpatient Medications on File Prior to Visit   Medication Sig Dispense Refill    esomeprazole magnesium 20 mg TbEC Take 20 mg by mouth once daily.       "FLUoxetine 40 MG capsule Take 40 mg by mouth once daily.      folic acid (FOLVITE) 1 MG tablet Take 1 tablet (1,000 mcg total) by mouth once daily. 90 tablet 3    hydroxychloroquine (PLAQUENIL) 200 mg tablet Take 1 tablet (200 mg total) by mouth 2 (two) times daily. 60 tablet 3    methotrexate 25 mg/mL injection Inject 0.8 mLs (20 mg total) into the muscle once a week. To be taken once weekly with daily folic acid for 365 doses 6 mL 3    ondansetron (ZOFRAN) 4 MG tablet Take 4 mg by mouth every 8 (eight) hours as needed.      syringe with needle, safety (MONOJECT TB SAFETY SYRINGE) 1 mL 28 gauge x 1/2" Syrg 1 application  by Misc.(Non-Drug; Combo Route) route once a week. 50 each 2    tirzepatide (MOUNJARO SUBQ) Inject into the skin.      traMADoL (ULTRAM) 50 mg tablet TAKE 1 OR 2 TABLETS BY MOUTH EVERY 8 HOURS AS NEEDED FOR PAIN 180 tablet 2    traZODone (DESYREL) 50 MG tablet TAKE 1 OR 2 TABLETS BY MOUTH EVERY EVENING 60 tablet 11    [DISCONTINUED] certolizumab pegol (CIMZIA) 400 mg/2 mL (200 mg/mL x 2) SyKt Inject 1 mL (200 mg total) into the skin every 14 (fourteen) days. 1 each 11    atorvastatin (LIPITOR) 20 MG tablet Take 20 mg by mouth.      losartan (COZAAR) 50 MG tablet Take 50 mg by mouth once daily.      nystatin (MYCOSTATIN) ointment 1 application 2 (two) times daily.      pravastatin (PRAVACHOL) 10 MG tablet Take 10 mg by mouth every evening.  5     No current facility-administered medications on file prior to visit.       Vitals:    10/17/24 1349   BP: 133/73   Pulse: 72         Physical Exam:    Physical Exam  Constitutional:       General: She is not in acute distress.     Appearance: She is well-developed.   Eyes:      General: Lids are normal.   Neurological:      Mental Status: She is alert and oriented to person, place, and time.   Psychiatric:         Behavior: Behavior normal.         Thought Content: Thought content normal.         Assessment:       Encounter Diagnoses   Name Primary?    " Rheumatoid arthritis, involving unspecified site, unspecified whether rheumatoid factor present Yes    Rheumatoid arthritis involving both hands with positive rheumatoid factor     Immunosuppression     Malaise and fatigue                Plan:        Rheumatoid arthritis, involving unspecified site, unspecified whether rheumatoid factor present    Rheumatoid arthritis involving both hands with positive rheumatoid factor  -     folic acid (FOLVITE) 1 MG tablet; Take 1 tablet (1,000 mcg total) by mouth once daily.  Dispense: 90 tablet; Refill: 3  -     hydroxychloroquine (PLAQUENIL) 200 mg tablet; Take 1 tablet (200 mg total) by mouth 2 (two) times daily.  Dispense: 60 tablet; Refill: 3  -     methotrexate 25 mg/mL injection; Inject 0.8 mLs (20 mg total) into the muscle once a week. To be taken once weekly with daily folic acid for 365 doses  Dispense: 6 mL; Refill: 3    Immunosuppression  -     folic acid (FOLVITE) 1 MG tablet; Take 1 tablet (1,000 mcg total) by mouth once daily.  Dispense: 90 tablet; Refill: 3  -     hydroxychloroquine (PLAQUENIL) 200 mg tablet; Take 1 tablet (200 mg total) by mouth 2 (two) times daily.  Dispense: 60 tablet; Refill: 3  -     methotrexate 25 mg/mL injection; Inject 0.8 mLs (20 mg total) into the muscle once a week. To be taken once weekly with daily folic acid for 365 doses  Dispense: 6 mL; Refill: 3    Malaise and fatigue  -     folic acid (FOLVITE) 1 MG tablet; Take 1 tablet (1,000 mcg total) by mouth once daily.  Dispense: 90 tablet; Refill: 3  -     hydroxychloroquine (PLAQUENIL) 200 mg tablet; Take 1 tablet (200 mg total) by mouth 2 (two) times daily.  Dispense: 60 tablet; Refill: 3  -     methotrexate 25 mg/mL injection; Inject 0.8 mLs (20 mg total) into the muscle once a week. To be taken once weekly with daily folic acid for 365 doses  Dispense: 6 mL; Refill: 3          Patient is a 54-year-old female with rheumatoid arthritis     Ok for prednisone if flares as you have done  in past.   Failed Humira with URI/HA  Cimzia. Start 3/14/22 - 10/2024 -STOP  MTX 0.6mL every week   HCQ 200mg BID-UTD as of 2022     Patient with left forearm melanoma discussion that we avoid TNFi as a class in melanoma and NMSC when we are able. Reviewed that previous ACR guidelines typically recommended rituxan for all malignancies <5 years, but this data is shifting as newer drugs emerge. We have limited data on safety of biologics with specific cancers but growing comfort with good f/u that we can use other biologics Ex IL-6 inhibitors w/ little risk for recurrence of malignancy.     F/u 4 months.     No follow-ups on file.      50min consultation with greater than 50% of that time included Preparing to see the patient (review records, tests), Obtaining and/or reviewing separately obtained historical data, Performing a medically appropriate examination and/or evaluation , Ordering medications, tests, and/or procedures, Referring and communicating with other healthcare professionals , Documenting clinical information in the electronic or other health record and Independently interpreting results  (as warranted) & communicating results to the patient/family/caregiver. All questions answered.

## 2024-10-21 ENCOUNTER — PATIENT MESSAGE (OUTPATIENT)
Dept: RHEUMATOLOGY | Facility: CLINIC | Age: 54
End: 2024-10-21
Payer: COMMERCIAL

## 2024-12-13 ENCOUNTER — OFFICE VISIT (OUTPATIENT)
Dept: RHEUMATOLOGY | Facility: CLINIC | Age: 54
End: 2024-12-13
Payer: COMMERCIAL

## 2024-12-13 VITALS
DIASTOLIC BLOOD PRESSURE: 68 MMHG | HEART RATE: 87 BPM | BODY MASS INDEX: 29.08 KG/M2 | SYSTOLIC BLOOD PRESSURE: 117 MMHG | HEIGHT: 62 IN | WEIGHT: 158.06 LBS

## 2024-12-13 DIAGNOSIS — R53.83 MALAISE AND FATIGUE: ICD-10-CM

## 2024-12-13 DIAGNOSIS — M06.9 RHEUMATOID ARTHRITIS, INVOLVING UNSPECIFIED SITE, UNSPECIFIED WHETHER RHEUMATOID FACTOR PRESENT: Primary | ICD-10-CM

## 2024-12-13 DIAGNOSIS — M77.11 RIGHT LATERAL EPICONDYLITIS: ICD-10-CM

## 2024-12-13 DIAGNOSIS — D84.9 IMMUNOSUPPRESSION: ICD-10-CM

## 2024-12-13 DIAGNOSIS — M05.741 RHEUMATOID ARTHRITIS INVOLVING BOTH HANDS WITH POSITIVE RHEUMATOID FACTOR: ICD-10-CM

## 2024-12-13 DIAGNOSIS — R53.81 MALAISE AND FATIGUE: ICD-10-CM

## 2024-12-13 DIAGNOSIS — Z79.899 HIGH RISK MEDICATIONS (NOT ANTICOAGULANTS) LONG-TERM USE: ICD-10-CM

## 2024-12-13 DIAGNOSIS — M05.742 RHEUMATOID ARTHRITIS INVOLVING BOTH HANDS WITH POSITIVE RHEUMATOID FACTOR: ICD-10-CM

## 2024-12-13 PROCEDURE — 99999 PR PBB SHADOW E&M-EST. PATIENT-LVL IV: CPT | Mod: PBBFAC,,, | Performed by: INTERNAL MEDICINE

## 2024-12-13 PROCEDURE — 3008F BODY MASS INDEX DOCD: CPT | Mod: CPTII,S$GLB,, | Performed by: INTERNAL MEDICINE

## 2024-12-13 PROCEDURE — 1159F MED LIST DOCD IN RCRD: CPT | Mod: CPTII,S$GLB,, | Performed by: INTERNAL MEDICINE

## 2024-12-13 PROCEDURE — 4010F ACE/ARB THERAPY RXD/TAKEN: CPT | Mod: CPTII,S$GLB,, | Performed by: INTERNAL MEDICINE

## 2024-12-13 PROCEDURE — 3078F DIAST BP <80 MM HG: CPT | Mod: CPTII,S$GLB,, | Performed by: INTERNAL MEDICINE

## 2024-12-13 PROCEDURE — 20551 NJX 1 TENDON ORIGIN/INSJ: CPT | Mod: S$GLB,,, | Performed by: INTERNAL MEDICINE

## 2024-12-13 PROCEDURE — 3074F SYST BP LT 130 MM HG: CPT | Mod: CPTII,S$GLB,, | Performed by: INTERNAL MEDICINE

## 2024-12-13 PROCEDURE — 3044F HG A1C LEVEL LT 7.0%: CPT | Mod: CPTII,S$GLB,, | Performed by: INTERNAL MEDICINE

## 2024-12-13 PROCEDURE — 99215 OFFICE O/P EST HI 40 MIN: CPT | Mod: 25,S$GLB,, | Performed by: INTERNAL MEDICINE

## 2024-12-13 RX ORDER — TOCILIZUMAB 180 MG/ML
162 INJECTION, SOLUTION SUBCUTANEOUS
Qty: 1.8 ML | Refills: 11 | Status: ACTIVE | OUTPATIENT
Start: 2024-12-13 | End: 2025-12-13

## 2024-12-13 RX ORDER — FOLIC ACID 1 MG/1
1000 TABLET ORAL DAILY
Qty: 90 TABLET | Refills: 3 | Status: CANCELLED | OUTPATIENT
Start: 2024-12-13 | End: 2025-12-13

## 2024-12-13 RX ORDER — SYRINGE,SAFETY WITH NEEDLE,1ML 28GX1/2"
1 SYRINGE, EMPTY DISPOSABLE MISCELLANEOUS WEEKLY
Qty: 50 EACH | Refills: 2 | Status: CANCELLED | OUTPATIENT
Start: 2024-12-13

## 2024-12-13 RX ORDER — METHOTREXATE 25 MG/ML
20 INJECTION, SOLUTION INTRAMUSCULAR; INTRATHECAL; INTRAVENOUS; SUBCUTANEOUS WEEKLY
Qty: 6 ML | Refills: 3 | Status: CANCELLED | OUTPATIENT
Start: 2024-12-13 | End: 2031-12-06

## 2024-12-13 RX ORDER — TRAMADOL HYDROCHLORIDE 50 MG/1
TABLET ORAL
Qty: 180 TABLET | Refills: 2 | Status: CANCELLED | OUTPATIENT
Start: 2024-12-13

## 2024-12-13 RX ORDER — HYDROXYCHLOROQUINE SULFATE 200 MG/1
200 TABLET, FILM COATED ORAL 2 TIMES DAILY
Qty: 60 TABLET | Refills: 3 | Status: CANCELLED | OUTPATIENT
Start: 2024-12-13

## 2024-12-13 RX ADMIN — METHYLPREDNISOLONE ACETATE 40 MG: 40 INJECTION, SUSPENSION INTRA-ARTICULAR; INTRALESIONAL; INTRAMUSCULAR; SOFT TISSUE at 11:12

## 2024-12-13 ASSESSMENT — ROUTINE ASSESSMENT OF PATIENT INDEX DATA (RAPID3)
PAIN SCORE: 5
TOTAL RAPID3 SCORE: 3.33
PATIENT GLOBAL ASSESSMENT SCORE: 3
FATIGUE SCORE: 2.2
MDHAQ FUNCTION SCORE: 0.6
PSYCHOLOGICAL DISTRESS SCORE: 1.1

## 2024-12-13 NOTE — PROGRESS NOTES
Subjective:          Chief Complaint: Annita Bettencourt is a 54 y.o. female who had concerns including Disease Management.    HPI:  Patient is a 54-year-old female here for consultation f/u sero+ high titer RA  Hands at MCP and wrist.   Iritis in 2022 treated with topical gtt,   Changed to Cimzia 3/2022  [facility] (Dr. Goldstein). ? If this is related to RA. Did have very dry eyes on Systane BID at minimum. She completed OCT scan 2023. No prior hx of iritis.     12/2024: long discussion about Actemra. Having pain in right lateral epicondyle requesting injection.   Starting to note flare of various joints since off biologics. Spoke with Dr. Campbell in Derm low incidence of recurrence melanoma.     10/2024: patient returns today we did send messages on portal for her to DC cimzia but she has not read these. She is permanent DC for all TNFi with dx of melanoma until I can speak with Derm about plans going forward. Continue MTX.     8/29/2024: patient received call prior to this appt with +melanoma.     10/2023: patient doing well Rapid 3: 0.5  Current: Cimzia , HCQ 200mg BID, MTX 0.6mL SQ weekly, folic acid.   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   She is walking few miles few times weekly.   She has lost 82#, Feeling markedly better! Mounjaro since October 22 with diet and exercise.       She is currently on  Cimzia (3/2022-OFF tolerated well very effective. Decision 10/2024 to stop TNF class for melanoma. )   MTX  sq  25 mg once weekly (cao from oral 12/2022 for nausea)   hydroxychloroquine 200 mgBID (since consultation as previously on once daily)   ibuprofen 800 mg t.i.d. p.r.n..    Tramadol 100mg TID PRN. - rare  Predniosne PRN has not used in nearly 1 year.   She has previously been on varying doses of prednisone PRN flares approx 1-2 monthly (50mg, 40mg, 30mg, 20mg and 10mg etc).     Previous medication:   Humira- congestion/HA .  Enbrel lost efficacy, iritis  and URI symptoms returned.       +AM stiffness <30min. Joint improve with activity.   The patient was diagnosed 5+  years ago    She has chronic dry eyes using systane. + dry mouth-  Patient currently has pain in her right shoulder right wrist of rates his pain at 2/10.    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 No MI  Still with sleep difficulty: Flexeril did not help. Doxepin 10mg not helping. Restless with sleep latency, stays asleep once down.    Component      Latest Ref Rng & Units 8/7/2018   NIL      See text IU/mL 0.050   TB1 - Nil      See text IU/mL -0.030   TB2 - Nil      See text IU/mL -0.030   Mitogen - Nil      See text IU/mL 8.140   TB Gold Plus       Negative   Hepatitis B Surface Ag       Negative   Hep B C IgM       Negative   Hep A IgM       Negative   Hepatitis C Ab       Negative   Rheumatoid Factor      0.0 - 15.0 IU/mL 397.0 (H)   CCP Antibodies      <5.0 U/mL 160.5 (H)   Sed Rate      0 - 20 mm/Hr 9   CRP      0.0 - 8.2 mg/L 1.9       REVIEW OF SYSTEMS:    Review of Systems   Constitutional:  Negative for fever.   Eyes:  Negative for redness.   Respiratory:  Negative for cough and shortness of breath.    Cardiovascular:  Negative for chest pain.   Gastrointestinal:  Negative for constipation and diarrhea.   Genitourinary:  Negative for dysuria.   Skin:  Negative for rash.   Neurological:  Negative for headaches.   Endo/Heme/Allergies:  Does not bruise/bleed easily.               Objective:            Past Medical History:   Diagnosis Date    Acid reflux     Anxiety     Depression     Diabetes mellitus     Enlarged heart     Hypertension     Murmur     Rheumatoid arthritis      Family History   Problem Relation Name Age of Onset    Hypertension Father      Breast cancer Maternal Aunt      Breast cancer Maternal Grandmother       Social History     Tobacco Use    Smoking status: Every Day     Current packs/day: 0.25     Average packs/day: 0.3 packs/day for 25.0 years (6.3  "ttl pk-yrs)     Types: Cigarettes    Smokeless tobacco: Never   Substance Use Topics    Alcohol use: Yes     Comment: socially    Drug use: No         Current Outpatient Medications on File Prior to Visit   Medication Sig Dispense Refill    esomeprazole magnesium 20 mg TbEC Take 20 mg by mouth once daily.      FLUoxetine 40 MG capsule Take 40 mg by mouth once daily.      folic acid (FOLVITE) 1 MG tablet Take 1 tablet (1,000 mcg total) by mouth once daily. 90 tablet 3    hydroxychloroquine (PLAQUENIL) 200 mg tablet Take 1 tablet (200 mg total) by mouth 2 (two) times daily. 60 tablet 3    losartan (COZAAR) 50 MG tablet Take 50 mg by mouth once daily.      methotrexate 25 mg/mL injection Inject 0.8 mLs (20 mg total) into the muscle once a week. To be taken once weekly with daily folic acid for 365 doses 6 mL 3    nystatin (MYCOSTATIN) ointment 1 application 2 (two) times daily.      ondansetron (ZOFRAN) 4 MG tablet Take 4 mg by mouth every 8 (eight) hours as needed.      pravastatin (PRAVACHOL) 10 MG tablet Take 10 mg by mouth every evening.  5    syringe with needle, safety (MONOJECT TB SAFETY SYRINGE) 1 mL 28 gauge x 1/2" Syrg 1 application  by Misc.(Non-Drug; Combo Route) route once a week. 50 each 2    tirzepatide (MOUNJARO SUBQ) Inject into the skin.      traMADoL (ULTRAM) 50 mg tablet TAKE 1 OR 2 TABLETS BY MOUTH EVERY 8 HOURS AS NEEDED FOR PAIN 180 tablet 2    traZODone (DESYREL) 50 MG tablet TAKE 1 OR 2 TABLETS BY MOUTH EVERY EVENING 60 tablet 11    atorvastatin (LIPITOR) 20 MG tablet Take 20 mg by mouth.       No current facility-administered medications on file prior to visit.       Vitals:    12/13/24 1114   BP: 117/68   Pulse: 87         Physical Exam:    Physical Exam  Constitutional:       General: She is not in acute distress.     Appearance: She is well-developed.   Eyes:      General: Lids are normal.   Neurological:      Mental Status: She is alert and oriented to person, place, and time. "   Psychiatric:         Behavior: Behavior normal.         Thought Content: Thought content normal.           Assessment:       Encounter Diagnoses   Name Primary?    Rheumatoid arthritis, involving unspecified site, unspecified whether rheumatoid factor present Yes    Immunosuppression     High risk medications (not anticoagulants) long-term use     Rheumatoid arthritis involving both hands with positive rheumatoid factor     Malaise and fatigue     Right lateral epicondylitis            Plan:        Rheumatoid arthritis, involving unspecified site, unspecified whether rheumatoid factor present    Immunosuppression  -     ALT (SGPT); Future; Expected date: 12/13/2024  -     CBC Auto Differential; Future; Expected date: 12/13/2024  -     AST (SGOT); Future; Expected date: 12/13/2024  -     C-Reactive Protein; Future; Expected date: 12/13/2024  -     Creatinine, Serum; Future; Expected date: 12/13/2024  -     Sedimentation rate; Future; Expected date: 12/13/2024    High risk medications (not anticoagulants) long-term use  -     ALT (SGPT); Future; Expected date: 12/13/2024  -     CBC Auto Differential; Future; Expected date: 12/13/2024  -     AST (SGOT); Future; Expected date: 12/13/2024  -     C-Reactive Protein; Future; Expected date: 12/13/2024  -     Creatinine, Serum; Future; Expected date: 12/13/2024  -     Sedimentation rate; Future; Expected date: 12/13/2024    Rheumatoid arthritis involving both hands with positive rheumatoid factor  -     tocilizumab (ACTEMRA ACTPEN) 162 mg/0.9 mL PnIj; Inject 162 mg into the skin every 14 (fourteen) days.  Dispense: 1.8 mL; Refill: 11  -     ALT (SGPT); Future; Expected date: 12/13/2024  -     CBC Auto Differential; Future; Expected date: 12/13/2024  -     AST (SGOT); Future; Expected date: 12/13/2024  -     C-Reactive Protein; Future; Expected date: 12/13/2024  -     Creatinine, Serum; Future; Expected date: 12/13/2024  -     Sedimentation rate; Future; Expected date:  12/13/2024    Malaise and fatigue    Right lateral epicondylitis  -     Tendon Origin      Patient is a 54-year-old female with rheumatoid arthritis recent diagnosis of early stage melanoma. Discussed all the literature we have thus far and while we have guidelines melanoma has been an unclear area with regard to biologic management and safety. Discussed with her Dermatologist she has less than 1% risk of recurrence given staging which is reassuring. She is not going to tolerate her RA w/ MTX alone.     Ok for prednisone if flares as you have done in past.   Failed Humira with URI/HA  Cimzia. Start 3/14/22 - 10/2024 -STOP  MTX 0.6mL every week   HCQ 200mg BID-UTD as of 2022     Patient with left forearm melanoma discussion that we avoid TNFi as a class in melanoma and NMSC when we are able. Reviewed that previous ACR guidelines typically recommended rituxan for all malignancies <5 years, but this data is shifting as newer drugs emerge. We have limited data on safety of biologics with specific cancers but growing comfort with good f/u that we can use other biologics Ex IL-6 inhibitors w/ little risk for recurrence of malignancy.     Patient off Cimzia due to recent Melanoma. Decision to switch to IL-6 inhibition. Low risk for recurrence but felt safest option at this time.  Reviewed all the risks of immunosuppression in Melanoma her risk     F/u 4 months.     Follow up in about 4 months (around 4/13/2025).      40min consultation with greater than 50% of that time included Preparing to see the patient (review records, tests), Obtaining and/or reviewing separately obtained historical data, Performing a medically appropriate examination and/or evaluation , Ordering medications, tests, and/or procedures, Referring and communicating with other healthcare professionals , Documenting clinical information in the electronic or other health record and Independently interpreting results  (as warranted) & communicating results  to the patient/family/caregiver. All questions answered.

## 2024-12-28 ENCOUNTER — PATIENT MESSAGE (OUTPATIENT)
Dept: RHEUMATOLOGY | Facility: CLINIC | Age: 54
End: 2024-12-28
Payer: COMMERCIAL

## 2024-12-28 DIAGNOSIS — M77.12 LATERAL EPICONDYLITIS OF BOTH ELBOWS: Primary | ICD-10-CM

## 2024-12-28 DIAGNOSIS — M77.11 LATERAL EPICONDYLITIS OF BOTH ELBOWS: Primary | ICD-10-CM

## 2024-12-28 RX ORDER — METHYLPREDNISOLONE ACETATE 40 MG/ML
40 INJECTION, SUSPENSION INTRA-ARTICULAR; INTRALESIONAL; INTRAMUSCULAR; SOFT TISSUE
Status: DISCONTINUED | OUTPATIENT
Start: 2024-12-13 | End: 2024-12-28 | Stop reason: HOSPADM

## 2025-01-06 ENCOUNTER — TELEPHONE (OUTPATIENT)
Dept: RHEUMATOLOGY | Facility: CLINIC | Age: 55
End: 2025-01-06
Payer: COMMERCIAL

## 2025-01-06 DIAGNOSIS — M05.741 RHEUMATOID ARTHRITIS INVOLVING BOTH HANDS WITH POSITIVE RHEUMATOID FACTOR: ICD-10-CM

## 2025-01-06 DIAGNOSIS — M05.742 RHEUMATOID ARTHRITIS INVOLVING BOTH HANDS WITH POSITIVE RHEUMATOID FACTOR: ICD-10-CM

## 2025-01-06 DIAGNOSIS — M06.9 RHEUMATOID ARTHRITIS, INVOLVING UNSPECIFIED SITE, UNSPECIFIED WHETHER RHEUMATOID FACTOR PRESENT: Primary | ICD-10-CM

## 2025-01-06 RX ORDER — SARILUMAB 200 MG/1.14ML
200 INJECTION, SOLUTION SUBCUTANEOUS
Qty: 2.28 ML | Refills: 11 | Status: ACTIVE | OUTPATIENT
Start: 2025-01-06

## 2025-01-06 NOTE — TELEPHONE ENCOUNTER
----- Message from Jumana Clemons sent at 12/30/2024  5:12 PM CST -----  Regarding: Actemra / Kevzara  Good evening Waleska,    The patient's plan considers Actemra non formulary. The preferred IL-6 inhibitor is Kevzara. Would it be appropriate to change therapy to Kevzara per plan preference?    Thank you!    Deonte Luz, PharmD  Clinical Pharmacist   [facility]   P: [phone]

## 2025-01-17 ENCOUNTER — LAB VISIT (OUTPATIENT)
Dept: LAB | Facility: HOSPITAL | Age: 55
End: 2025-01-17
Attending: INTERNAL MEDICINE
Payer: COMMERCIAL

## 2025-01-17 DIAGNOSIS — M06.9 RHEUMATOID ARTHRITIS, INVOLVING UNSPECIFIED SITE, UNSPECIFIED WHETHER RHEUMATOID FACTOR PRESENT: ICD-10-CM

## 2025-01-17 DIAGNOSIS — D84.9 IMMUNOSUPPRESSION: ICD-10-CM

## 2025-01-17 LAB
ALT SERPL W/O P-5'-P-CCNC: 21 U/L (ref 10–44)
AST SERPL-CCNC: 21 U/L (ref 10–40)
BASOPHILS # BLD AUTO: 0.1 K/UL (ref 0–0.2)
BASOPHILS NFR BLD: 1.2 % (ref 0–1.9)
CREAT SERPL-MCNC: 0.9 MG/DL (ref 0.5–1.4)
CRP SERPL-MCNC: 0.7 MG/L (ref 0–8.2)
DIFFERENTIAL METHOD BLD: ABNORMAL
EOSINOPHIL # BLD AUTO: 0.4 K/UL (ref 0–0.5)
EOSINOPHIL NFR BLD: 4.5 % (ref 0–8)
ERYTHROCYTE [DISTWIDTH] IN BLOOD BY AUTOMATED COUNT: 13.6 % (ref 11.5–14.5)
ERYTHROCYTE [SEDIMENTATION RATE] IN BLOOD BY PHOTOMETRIC METHOD: 7 MM/HR (ref 0–36)
EST. GFR  (NO RACE VARIABLE): >60 ML/MIN/1.73 M^2
HCT VFR BLD AUTO: 48 % (ref 37–48.5)
HGB BLD-MCNC: 15.3 G/DL (ref 12–16)
IMM GRANULOCYTES # BLD AUTO: 0.03 K/UL (ref 0–0.04)
IMM GRANULOCYTES NFR BLD AUTO: 0.3 % (ref 0–0.5)
LYMPHOCYTES # BLD AUTO: 2.2 K/UL (ref 1–4.8)
LYMPHOCYTES NFR BLD: 26.1 % (ref 18–48)
MCH RBC QN AUTO: 30.4 PG (ref 27–31)
MCHC RBC AUTO-ENTMCNC: 31.9 G/DL (ref 32–36)
MCV RBC AUTO: 95 FL (ref 82–98)
MONOCYTES # BLD AUTO: 0.7 K/UL (ref 0.3–1)
MONOCYTES NFR BLD: 8.4 % (ref 4–15)
NEUTROPHILS # BLD AUTO: 5.1 K/UL (ref 1.8–7.7)
NEUTROPHILS NFR BLD: 59.5 % (ref 38–73)
NRBC BLD-RTO: 0 /100 WBC
PLATELET # BLD AUTO: 243 K/UL (ref 150–450)
PMV BLD AUTO: 12.1 FL (ref 9.2–12.9)
RBC # BLD AUTO: 5.04 M/UL (ref 4–5.4)
WBC # BLD AUTO: 8.58 K/UL (ref 3.9–12.7)

## 2025-01-17 PROCEDURE — 36415 COLL VENOUS BLD VENIPUNCTURE: CPT | Mod: PO | Performed by: INTERNAL MEDICINE

## 2025-01-17 PROCEDURE — 82565 ASSAY OF CREATININE: CPT | Performed by: INTERNAL MEDICINE

## 2025-01-17 PROCEDURE — 84450 TRANSFERASE (AST) (SGOT): CPT | Performed by: INTERNAL MEDICINE

## 2025-01-17 PROCEDURE — 85652 RBC SED RATE AUTOMATED: CPT | Performed by: INTERNAL MEDICINE

## 2025-01-17 PROCEDURE — 86140 C-REACTIVE PROTEIN: CPT | Performed by: INTERNAL MEDICINE

## 2025-01-17 PROCEDURE — 84460 ALANINE AMINO (ALT) (SGPT): CPT | Performed by: INTERNAL MEDICINE

## 2025-01-17 PROCEDURE — 85025 COMPLETE CBC W/AUTO DIFF WBC: CPT | Performed by: INTERNAL MEDICINE

## 2025-02-14 ENCOUNTER — OFFICE VISIT (OUTPATIENT)
Dept: PHYSICAL MEDICINE AND REHAB | Facility: CLINIC | Age: 55
End: 2025-02-14
Payer: COMMERCIAL

## 2025-02-14 VITALS
BODY MASS INDEX: 30.27 KG/M2 | DIASTOLIC BLOOD PRESSURE: 63 MMHG | SYSTOLIC BLOOD PRESSURE: 134 MMHG | HEART RATE: 77 BPM | WEIGHT: 165.56 LBS

## 2025-02-14 DIAGNOSIS — M77.12 LATERAL EPICONDYLITIS OF BOTH ELBOWS: ICD-10-CM

## 2025-02-14 DIAGNOSIS — M77.11 LATERAL EPICONDYLITIS OF RIGHT ELBOW: Primary | ICD-10-CM

## 2025-02-14 DIAGNOSIS — M77.11 LATERAL EPICONDYLITIS OF BOTH ELBOWS: ICD-10-CM

## 2025-02-14 PROCEDURE — 99999 PR PBB SHADOW E&M-EST. PATIENT-LVL IV: CPT | Mod: PBBFAC,,, | Performed by: STUDENT IN AN ORGANIZED HEALTH CARE EDUCATION/TRAINING PROGRAM

## 2025-02-14 NOTE — ASSESSMENT & PLAN NOTE
She has failed several injections with the most recent being approx 2 months ago providing very short lived relief.   Discussed potential for Tenex, surgery and a trial of therapy and we opted for PT.   RTC 4-6 wks for repeat injection with needle fenestration under US guidance vs referral to Dr. Woodson for Tenex for right lateral epicondylitis.

## 2025-02-19 ENCOUNTER — TELEPHONE (OUTPATIENT)
Dept: RHEUMATOLOGY | Facility: CLINIC | Age: 55
End: 2025-02-19
Payer: COMMERCIAL

## 2025-02-19 DIAGNOSIS — Z51.81 MEDICATION MONITORING ENCOUNTER: ICD-10-CM

## 2025-02-19 DIAGNOSIS — M06.9 RHEUMATOID ARTHRITIS, INVOLVING UNSPECIFIED SITE, UNSPECIFIED WHETHER RHEUMATOID FACTOR PRESENT: Primary | ICD-10-CM

## 2025-02-19 DIAGNOSIS — Z79.899 HIGH RISK MEDICATIONS (NOT ANTICOAGULANTS) LONG-TERM USE: ICD-10-CM

## 2025-02-19 NOTE — TELEPHONE ENCOUNTER
----- Message from Pharmacist Carole sent at 2/18/2025 12:33 PM CST -----  Regarding: Lipid Panel  Good afternoon,It is recommended for patients starting Kevzara that they complete a lipid panel 4-8 weeks post initiation. Per chart review, I do not see one ordered. If the office feels this is appropriate, please schedule and let patient know.Thanks,Carole Rojo, PharmDClinical PharmacistOchsner Specialty [id number] [place] [phone]: [phone]: [phone]

## 2025-02-19 NOTE — TELEPHONE ENCOUNTER
Usually check once a year. Last checked 3/2024 so time for recheck    Ordered.     Staff,  Please reach out to patient to schedule needed lab. Thanks!

## 2025-02-19 NOTE — TELEPHONE ENCOUNTER
Left voicemail message that patient needs to call [phone] to schedule Lipid Panel as it has been over a year.

## 2025-03-03 ENCOUNTER — HOSPITAL ENCOUNTER (OUTPATIENT)
Dept: RADIOLOGY | Facility: HOSPITAL | Age: 55
Discharge: HOME | End: 2025-03-03
Attending: FAMILY MEDICINE
Payer: COMMERCIAL

## 2025-03-03 DIAGNOSIS — Z12.31 ENCOUNTER FOR SCREENING MAMMOGRAM FOR BREAST CANCER: ICD-10-CM

## 2025-03-03 PROCEDURE — 77063 BREAST TOMOSYNTHESIS BI: CPT | Mod: 26,,, | Performed by: RADIOLOGY

## 2025-03-03 PROCEDURE — 77067 SCR MAMMO BI INCL CAD: CPT | Mod: 26,,, | Performed by: RADIOLOGY

## 2025-03-03 PROCEDURE — 77067 SCR MAMMO BI INCL CAD: CPT | Mod: TC,PO

## 2025-03-05 ENCOUNTER — TELEPHONE (OUTPATIENT)
Dept: RADIOLOGY | Facility: HOSPITAL | Age: 55
End: 2025-03-05
Payer: COMMERCIAL

## 2025-03-11 ENCOUNTER — TELEPHONE (OUTPATIENT)
Dept: RADIOLOGY | Facility: HOSPITAL | Age: 55
End: 2025-03-11
Payer: COMMERCIAL

## 2025-03-12 ENCOUNTER — TELEPHONE (OUTPATIENT)
Dept: RADIOLOGY | Facility: HOSPITAL | Age: 55
End: 2025-03-12
Payer: COMMERCIAL

## 2025-03-27 ENCOUNTER — PATIENT MESSAGE (OUTPATIENT)
Dept: ADMINISTRATIVE | Facility: OTHER | Age: 55
End: 2025-03-27
Payer: COMMERCIAL

## 2025-03-28 ENCOUNTER — OFFICE VISIT (OUTPATIENT)
Dept: PHYSICAL MEDICINE AND REHAB | Facility: CLINIC | Age: 55
End: 2025-03-28
Payer: COMMERCIAL

## 2025-03-28 ENCOUNTER — HOSPITAL ENCOUNTER (OUTPATIENT)
Dept: RADIOLOGY | Facility: HOSPITAL | Age: 55
Discharge: HOME | End: 2025-03-28
Attending: FAMILY MEDICINE
Payer: COMMERCIAL

## 2025-03-28 VITALS
WEIGHT: 167.31 LBS | SYSTOLIC BLOOD PRESSURE: 136 MMHG | DIASTOLIC BLOOD PRESSURE: 62 MMHG | HEART RATE: 69 BPM | BODY MASS INDEX: 30.6 KG/M2

## 2025-03-28 DIAGNOSIS — M05.741 RHEUMATOID ARTHRITIS INVOLVING BOTH HANDS WITH POSITIVE RHEUMATOID FACTOR: ICD-10-CM

## 2025-03-28 DIAGNOSIS — R53.83 MALAISE AND FATIGUE: ICD-10-CM

## 2025-03-28 DIAGNOSIS — D84.9 IMMUNOSUPPRESSION: ICD-10-CM

## 2025-03-28 DIAGNOSIS — M25.521 RIGHT ELBOW PAIN: ICD-10-CM

## 2025-03-28 DIAGNOSIS — R92.8 ABNORMALITY OF LEFT BREAST ON SCREENING MAMMOGRAM: ICD-10-CM

## 2025-03-28 DIAGNOSIS — M77.11 LATERAL EPICONDYLITIS OF RIGHT ELBOW: Primary | ICD-10-CM

## 2025-03-28 DIAGNOSIS — R53.81 MALAISE AND FATIGUE: ICD-10-CM

## 2025-03-28 DIAGNOSIS — M05.742 RHEUMATOID ARTHRITIS INVOLVING BOTH HANDS WITH POSITIVE RHEUMATOID FACTOR: ICD-10-CM

## 2025-03-28 PROCEDURE — 77065 DX MAMMO INCL CAD UNI: CPT | Mod: TC,PO,LT

## 2025-03-28 PROCEDURE — 99999 PR PBB SHADOW E&M-EST. PATIENT-LVL III: CPT | Mod: PBBFAC,,, | Performed by: STUDENT IN AN ORGANIZED HEALTH CARE EDUCATION/TRAINING PROGRAM

## 2025-03-28 PROCEDURE — 77065 DX MAMMO INCL CAD UNI: CPT | Mod: 26,LT,, | Performed by: RADIOLOGY

## 2025-03-28 PROCEDURE — 77061 BREAST TOMOSYNTHESIS UNI: CPT | Mod: 26,LT,, | Performed by: RADIOLOGY

## 2025-03-28 RX ORDER — BETAMETHASONE SODIUM PHOSPHATE AND BETAMETHASONE ACETATE 3; 3 MG/ML; MG/ML
6 INJECTION, SUSPENSION INTRA-ARTICULAR; INTRALESIONAL; INTRAMUSCULAR; SOFT TISSUE
Status: DISCONTINUED | OUTPATIENT
Start: 2025-03-28 | End: 2025-03-28 | Stop reason: HOSPADM

## 2025-03-28 RX ADMIN — BETAMETHASONE SODIUM PHOSPHATE AND BETAMETHASONE ACETATE 6 MG: 3; 3 INJECTION, SUSPENSION INTRA-ARTICULAR; INTRALESIONAL; INTRAMUSCULAR; SOFT TISSUE at 11:03

## 2025-03-28 NOTE — ASSESSMENT & PLAN NOTE
Repeat right elbow injection for lateral epicondylitis.  6 mg of betamethasone and 2 cc of 1% lidocaine utilized.  See procedure note for details.  She has failed several injections with the most recent being approx 2 months ago providing very short lived relief.   Discussed potential for Tenex versus surgery  MRI of the right elbow given lack of improvement.   Continue counterforce strap.   RTC 6 wks likely for referral to Dr. Woodson for Tenex for right lateral epicondylitis.

## 2025-03-28 NOTE — PROCEDURES
Tendon Origin: right elbow common extensor tendon: R elbow    Date/Time: 3/28/2025 11:00 AM    Performed by: Aly Nathan MD  Authorized by: Aly Nathan MD    Consent Done?:  Yes (Verbal)  Timeout: prior to procedure the correct patient, procedure, and site was verified    Indications:  Arthritis  Site marked: the procedure site was marked    Timeout: prior to procedure the correct patient, procedure, and site was verified    Location:  Elbow  Site:  R elbow  Prep: patient was prepped and draped in usual sterile fashion    Ultrasonic Guidance for Needle Placement?: Yes    Needle size:  25 G  Approach:  Dorsal  Medications:  6 mg betamethasone acetate-betamethasone sodium phosphate 6 mg/mL  Aspirate:  Clear   US images saved.

## 2025-03-28 NOTE — PROGRESS NOTES
PHYSICAL MEDICINE AND REHABILITATION  New Patient Consult:    Subjective:   Chief Complaint:    Chief Complaint   Patient presents with    Elbow Pain     Right      Interval note on 03/28/2025:  Patient arrives today for scheduled follow up.  She recalls going to physical therapy without significant improvement in her pain.  She is open to additional treatments to help relieve her ongoing elbow pain.  Would like to repeat injection prior to going forward with Tenex.    Review of Systems  Per HPI    Imaging/Diagnostic Studies  XR ELBOW COMPLETE 3 VIEW RIGHT  Order: [id number]  Impression:  1. No radiographic evidence of displaced fracture or dislocation.    Narrative  XR ELBOW 3+ VIEW RIGHT  Indication: M25.521:Pain in right elbow,  Findings: There is no radiographic evidence of acute displaced fracture or dislocation. There is no acute abnormality in alignment. There is no acute abnormality in mineralization. Soft tissues are grossly unremarkable.  Exam End: 11/22/23 10:42 Last Resulted: 11/22/23 10:45     Past Medical History:   Diagnosis Date    Acid reflux     Anxiety     Depression     Diabetes mellitus     Enlarged heart     Hypertension     Murmur     Rheumatoid arthritis      Past Surgical History:   Procedure Laterality Date    ADENOIDECTOMY      CHOLECYSTECTOMY      HYSTERECTOMY      OOPHORECTOMY      TONSILLECTOMY       Review of patient's allergies indicates:   Allergen Reactions    Sulfa (sulfonamide antibiotics) Rash     Current Outpatient Medications   Medication Sig Dispense Refill    atorvastatin (LIPITOR) 20 MG tablet Take 20 mg by mouth.      esomeprazole magnesium 20 mg TbEC Take 20 mg by mouth once daily.      FLUoxetine 40 MG capsule Take 40 mg by mouth once daily.      folic acid (FOLVITE) 1 MG tablet Take 1 tablet (1,000 mcg total) by mouth once daily. 90 tablet 3    hydroxychloroquine (PLAQUENIL) 200 mg tablet Take 1 tablet (200 mg total) by mouth 2 (two) times daily. 60 tablet 3     "methotrexate 25 mg/mL injection Inject 0.8 mLs (20 mg total) into the muscle once a week. To be taken once weekly with daily folic acid for 365 doses 6 mL 3    nystatin (MYCOSTATIN) ointment 1 application 2 (two) times daily. (Patient taking differently: 1 application  as needed.)      ondansetron (ZOFRAN) 4 MG tablet Take 4 mg by mouth every 8 (eight) hours as needed.      sarilumab (KEVZARA) 200 mg/1.14 mL PnIj Inject 1.14 mLs (200 mg total) into the skin every 14 (fourteen) days. 2.28 mL 11    syringe with needle, safety (MONOJECT TB SAFETY SYRINGE) 1 mL 28 gauge x 1/2" Syrg 1 application  by Misc.(Non-Drug; Combo Route) route once a week. 50 each 2    tirzepatide (MOUNJARO SUBQ) Inject into the skin.      traMADoL (ULTRAM) 50 mg tablet TAKE 1 OR 2 TABLETS BY MOUTH EVERY 8 HOURS AS NEEDED FOR PAIN 180 tablet 2    traZODone (DESYREL) 50 MG tablet TAKE 1 OR 2 TABLETS BY MOUTH EVERY EVENING 60 tablet 11     No current facility-administered medications for this visit.     Family History   Problem Relation Name Age of Onset    Hypertension Father      Breast cancer Maternal Aunt      Breast cancer Maternal Grandmother       Social History     Socioeconomic History    Marital status:    Tobacco Use    Smoking status: Every Day     Current packs/day: 0.25     Average packs/day: 0.3 packs/day for 25.0 years (6.3 ttl pk-yrs)     Types: Cigarettes    Smokeless tobacco: Never   Substance and Sexual Activity    Alcohol use: Yes     Comment: socially    Drug use: No     Social Drivers of Health     Financial Resource Strain: Low Risk  (8/28/2024)    Overall Financial Resource Strain (CARDIA)     Difficulty of Paying Living Expenses: Not hard at all   Food Insecurity: No Food Insecurity (8/28/2024)    Hunger Vital Sign     Worried About Running Out of Food in the Last Year: Never true     Ran Out of Food in the Last Year: Never true   Physical Activity: Sufficiently Active (8/28/2024)    Exercise Vital Sign     Days of " Exercise per Week: 4 days     Minutes of Exercise per Session: 60 min   Stress: No Stress Concern Present (8/28/2024)    [employer] - Occupational Stress Questionnaire     Feeling of Stress : Only a little   Housing Stability: Unknown (8/28/2024)    Housing Stability Vital Sign     Unable to Pay for Housing in the Last Year: No         Objective:    /62 (BP Location: Right arm, Patient Position: Sitting)   Pulse 69   Wt 75.9 kg (167 lb 5.3 oz)   BMI 30.60 kg/m²   Physical Exam  Vitals and nursing note reviewed.   Constitutional:       Appearance: Normal appearance.   HENT:      Head: Normocephalic.   Eyes:      Extraocular Movements: Extraocular movements intact.   Cardiovascular:      Rate and Rhythm: Normal rate.      Pulses: Normal pulses.   Pulmonary:      Effort: Pulmonary effort is normal.   Abdominal:      General: Abdomen is flat.   Skin:     General: Skin is warm and dry.   Neurological:      General: No focal deficit present.      Mental Status: She is alert and oriented to person, place, and time. Mental status is at baseline.   Psychiatric:         Mood and Affect: Mood normal.         Behavior: Behavior normal.         Thought Content: Thought content normal.        Right Elbow Exam     Tenderness   The patient is experiencing tenderness in the lateral epicondyle.     Range of Motion   Extension:  normal   Flexion:  normal   Pronation:  normal   Supination:  normal     Muscle Strength   The patient has normal right elbow strength.    Tests   Varus: negative  Valgus: negative  Tinel's sign (cubital tunnel): negative    Other   Erythema: absent  Scars: absent  Sensation: normal  Pulse: present    Comments:  Positive Cozen's               Assessment:       ICD-10-CM ICD-9-CM    1. Lateral epicondylitis of right elbow  M77.11 726.32 ULS US Guidance for Needle Placement      Tendon Origin: right elbow common extensor tendon: R elbow      2. Right elbow pain  M25.521  719.42 MRI Elbow Joint Without Contrast Right          Plan:   1. Lateral epicondylitis of right elbow  Assessment & Plan:  Repeat right elbow injection for lateral epicondylitis.  6 mg of betamethasone and 2 cc of 1% lidocaine utilized.  See procedure note for details.  She has failed several injections with the most recent being approx 2 months ago providing very short lived relief.   Discussed potential for Tenex versus surgery  MRI of the right elbow given lack of improvement.   Continue counterforce strap.   RTC 6 wks likely for referral to Dr. Woodson for Tenex for right lateral epicondylitis.    Orders:  -     ULS US Guidance for Needle Placement  -     Tendon Origin: right elbow common extensor tendon: R elbow    2. Right elbow pain  -     MRI Elbow Joint Without Contrast Right; Future; Expected date: 03/28/2025      Aly Nathan MD  Physical Medicine & Rehabilitation     Disclaimer:  This note may have been prepared using voice recognition software, it may have not been extensively proofed, as such there could be errors within the text such as sound alike errors.  Contact the author of this note for clarification.          sore throat

## 2025-03-31 RX ORDER — HYDROXYCHLOROQUINE SULFATE 200 MG/1
200 TABLET, FILM COATED ORAL 2 TIMES DAILY
Qty: 60 TABLET | Refills: 3 | Status: SHIPPED | OUTPATIENT
Start: 2025-03-31

## 2025-04-05 ENCOUNTER — HOSPITAL ENCOUNTER (OUTPATIENT)
Dept: RADIOLOGY | Facility: HOSPITAL | Age: 55
Discharge: HOME | End: 2025-04-05
Attending: STUDENT IN AN ORGANIZED HEALTH CARE EDUCATION/TRAINING PROGRAM
Payer: COMMERCIAL

## 2025-04-05 DIAGNOSIS — M25.521 RIGHT ELBOW PAIN: ICD-10-CM

## 2025-04-05 DIAGNOSIS — G47.00 INSOMNIA, UNSPECIFIED TYPE: ICD-10-CM

## 2025-04-05 PROCEDURE — 73221 MRI JOINT UPR EXTREM W/O DYE: CPT | Mod: TC,PO,RT

## 2025-04-05 PROCEDURE — 73221 MRI JOINT UPR EXTREM W/O DYE: CPT | Mod: 26,RT,, | Performed by: RADIOLOGY

## 2025-04-05 RX ORDER — TRAZODONE HYDROCHLORIDE 50 MG/1
TABLET ORAL
Qty: 60 TABLET | Refills: 11 | Status: CANCELLED | OUTPATIENT
Start: 2025-04-05

## 2025-04-07 ENCOUNTER — RESULTS FOLLOW-UP (OUTPATIENT)
Dept: PHYSICAL MEDICINE AND REHAB | Facility: CLINIC | Age: 55
End: 2025-04-07

## 2025-04-07 ENCOUNTER — TELEPHONE (OUTPATIENT)
Dept: ORTHOPEDICS | Facility: CLINIC | Age: 55
End: 2025-04-07
Payer: COMMERCIAL

## 2025-04-07 DIAGNOSIS — M25.521 RIGHT ELBOW PAIN: ICD-10-CM

## 2025-04-07 DIAGNOSIS — M77.11 LATERAL EPICONDYLITIS OF RIGHT ELBOW: Primary | ICD-10-CM

## 2025-04-07 DIAGNOSIS — M77.11 LATERAL EPICONDYLITIS OF BOTH ELBOWS: ICD-10-CM

## 2025-04-07 DIAGNOSIS — M77.12 LATERAL EPICONDYLITIS OF BOTH ELBOWS: ICD-10-CM

## 2025-04-08 RX ORDER — TRAZODONE HYDROCHLORIDE 50 MG/1
TABLET ORAL
Qty: 60 TABLET | Refills: 11 | Status: SHIPPED | OUTPATIENT
Start: 2025-04-08

## 2025-04-14 ENCOUNTER — TELEPHONE (OUTPATIENT)
Dept: RHEUMATOLOGY | Facility: CLINIC | Age: 55
End: 2025-04-14
Payer: COMMERCIAL

## 2025-04-28 ENCOUNTER — OFFICE VISIT (OUTPATIENT)
Dept: ORTHOPEDICS | Facility: CLINIC | Age: 55
End: 2025-04-28
Payer: COMMERCIAL

## 2025-04-28 DIAGNOSIS — M77.11 LATERAL EPICONDYLITIS, RIGHT ELBOW: Primary | ICD-10-CM

## 2025-04-28 PROCEDURE — 1159F MED LIST DOCD IN RCRD: CPT | Mod: CPTII,S$GLB,, | Performed by: ORTHOPAEDIC SURGERY

## 2025-04-28 PROCEDURE — 99204 OFFICE O/P NEW MOD 45 MIN: CPT | Mod: S$GLB,,, | Performed by: ORTHOPAEDIC SURGERY

## 2025-04-28 PROCEDURE — 99999 PR PBB SHADOW E&M-EST. PATIENT-LVL III: CPT | Mod: PBBFAC,,, | Performed by: ORTHOPAEDIC SURGERY

## 2025-04-28 NOTE — PROGRESS NOTES
4/28/2025    Chief Complaint:  Chief Complaint   Patient presents with    Right Elbow - Pain       HPI:  Annita Bettencourt is a 54 y.o. female, who presents to clinic today has a history of right elbow pain.  The pain is located mainly over the lateral side of the elbow.  This has been going on for a long time.  She has had 3 or 4 different injections into her right elbow.  She did have an MRI.  She is here today for further evaluation and treatment.  She states that she recently had an injection in the elbow overall is feeling well today    PMHX:  Past Medical History:   Diagnosis Date    Acid reflux     Anxiety     Depression     Diabetes mellitus     Enlarged heart     Hypertension     Murmur     Rheumatoid arthritis        PSHX:  Past Surgical History:   Procedure Laterality Date    ADENOIDECTOMY      CHOLECYSTECTOMY      HYSTERECTOMY      OOPHORECTOMY      TONSILLECTOMY         FMHX:  Family History   Problem Relation Name Age of Onset    Hypertension Father      Breast cancer Maternal Aunt      Breast cancer Maternal Grandmother         SOCHX:  Social History     Tobacco Use    Smoking status: Every Day     Current packs/day: 0.25     Average packs/day: 0.3 packs/day for 25.0 years (6.3 ttl pk-yrs)     Types: Cigarettes    Smokeless tobacco: Never   Substance Use Topics    Alcohol use: Yes     Comment: socially       ALLERGIES:  Sulfa (sulfonamide antibiotics)    CURRENT MEDICATIONS:  Medications Ordered Prior to Encounter[1]    REVIEW OF SYSTEMS:  Review of Systems   Constitutional: Negative.    HENT: Negative.     Eyes: Negative.    Respiratory: Negative.     Cardiovascular: Negative.    Gastrointestinal: Negative.    Genitourinary: Negative.    Musculoskeletal:  Positive for joint pain.   Skin: Negative.    Neurological:  Positive for weakness.   Endo/Heme/Allergies: Negative.    Psychiatric/Behavioral: Negative.       GENERAL PHYSICAL EXAM:   There were no vitals taken for this visit.   GEN: well  developed, well nourished, no acute distress   HENT: Normocephalic, atraumatic   EYES: No discharge, conjunctiva normal   NECK: Supple, non-tender   PULM: No wheezing, no respiratory distress   CV: RRR   ABD: Soft, non-tender    ORTHO EXAM:   Examination of the right elbow reveals that there is no major skin change.  There was no significant edema.  Palpation does produce tenderness directly over the lateral epicondyle and common extensor origin.  Forced extension of the wrist reproduces her pain.  Range of motion of the elbow is 0-150 degrees.  He is able to pronate and supinate.  There was no varus or valgus instability.    RADIOLOGY:   MRI of the right elbow has been reviewed.  There was noted to be high-grade partial tearing of the common extensor origin.  There was edema within the tendinous structure at that site.  There is also a possibility of some cartilage loss overlying the radial head.    ASSESSMENT:   Right elbow lateral epicondylitis    PLAN:  1. I have discussed treatments with the patient.  I would not continue to inject this.  She has had 3-4 injections and that will begin to make her tendon significantly worse.  I have discussed the possibility of debridement of the tendon if she has continued worsening.      2. If she has any worsening of her symptoms she can follow up with me at that time at which point we will consider surgical intervention           [1]   Current Outpatient Medications on File Prior to Visit   Medication Sig Dispense Refill    atorvastatin (LIPITOR) 20 MG tablet Take 20 mg by mouth.      esomeprazole magnesium 20 mg TbEC Take 20 mg by mouth once daily.      FLUoxetine 40 MG capsule Take 40 mg by mouth once daily.      folic acid (FOLVITE) 1 MG tablet Take 1 tablet (1,000 mcg total) by mouth once daily. 90 tablet 3    hydroxychloroquine (PLAQUENIL) 200 mg tablet TAKE 1 TABLET BY MOUTH TWICE DAILY 60 tablet 3    methotrexate 25 mg/mL injection Inject 0.8 mLs (20 mg total) into  "the muscle once a week. To be taken once weekly with daily folic acid for 365 doses 6 mL 3    nystatin (MYCOSTATIN) ointment 1 application 2 (two) times daily. (Patient taking differently: 1 application  as needed.)      ondansetron (ZOFRAN) 4 MG tablet Take 4 mg by mouth every 8 (eight) hours as needed.      sarilumab (KEVZARA) 200 mg/1.14 mL PnIj Inject 1.14 mLs (200 mg total) into the skin every 14 (fourteen) days. 2.28 mL 11    syringe with needle, safety (MONOJECT TB SAFETY SYRINGE) 1 mL 28 gauge x 1/2" Syrg 1 application  by Misc.(Non-Drug; Combo Route) route once a week. 50 each 2    tirzepatide (MOUNJARO SUBQ) Inject into the skin.      traMADoL (ULTRAM) 50 mg tablet TAKE 1 OR 2 TABLETS BY MOUTH EVERY 8 HOURS AS NEEDED FOR PAIN 180 tablet 2    traZODone (DESYREL) 50 MG tablet TAKE 1 OR 2 TABLETS BY MOUTH EVERY EVENING 60 tablet 11     No current facility-administered medications on file prior to visit.     "

## 2025-04-28 NOTE — TELEPHONE ENCOUNTER
DOCUMENTATION ONLY:  Prior authorization for Enbrel 50 mg #4/28 approved from 03/02/2020 to 03/01/2021  Case ID: [id number]    Co-pay: $80.00    Patient Assistance IS required. Sending to the financial assistance team to investigate assistance options. Aida PRIDE     Referral in OPEN status. MCM sent to patient.

## 2025-05-01 ENCOUNTER — OFFICE VISIT (OUTPATIENT)
Dept: RHEUMATOLOGY | Facility: CLINIC | Age: 55
End: 2025-05-01
Payer: COMMERCIAL

## 2025-05-01 ENCOUNTER — LAB VISIT (OUTPATIENT)
Dept: LAB | Facility: HOSPITAL | Age: 55
End: 2025-05-01
Attending: INTERNAL MEDICINE
Payer: COMMERCIAL

## 2025-05-01 VITALS
SYSTOLIC BLOOD PRESSURE: 127 MMHG | HEIGHT: 62 IN | DIASTOLIC BLOOD PRESSURE: 81 MMHG | HEART RATE: 82 BPM | BODY MASS INDEX: 31.52 KG/M2 | WEIGHT: 171.31 LBS

## 2025-05-01 DIAGNOSIS — Z79.899 IMMUNOSUPPRESSION DUE TO DRUG THERAPY: ICD-10-CM

## 2025-05-01 DIAGNOSIS — R53.83 MALAISE AND FATIGUE: ICD-10-CM

## 2025-05-01 DIAGNOSIS — R53.81 MALAISE AND FATIGUE: ICD-10-CM

## 2025-05-01 DIAGNOSIS — M05.742 RHEUMATOID ARTHRITIS INVOLVING BOTH HANDS WITH POSITIVE RHEUMATOID FACTOR: ICD-10-CM

## 2025-05-01 DIAGNOSIS — D84.821 IMMUNOSUPPRESSION DUE TO DRUG THERAPY: ICD-10-CM

## 2025-05-01 DIAGNOSIS — Z79.899 HIGH RISK MEDICATIONS (NOT ANTICOAGULANTS) LONG-TERM USE: ICD-10-CM

## 2025-05-01 DIAGNOSIS — M62.838 CERVICAL PARASPINAL MUSCLE SPASM: ICD-10-CM

## 2025-05-01 DIAGNOSIS — M05.741 RHEUMATOID ARTHRITIS INVOLVING BOTH HANDS WITH POSITIVE RHEUMATOID FACTOR: ICD-10-CM

## 2025-05-01 DIAGNOSIS — M06.9 RHEUMATOID ARTHRITIS, INVOLVING UNSPECIFIED SITE, UNSPECIFIED WHETHER RHEUMATOID FACTOR PRESENT: ICD-10-CM

## 2025-05-01 DIAGNOSIS — D84.9 IMMUNOSUPPRESSION: ICD-10-CM

## 2025-05-01 DIAGNOSIS — M06.9 RHEUMATOID ARTHRITIS, INVOLVING UNSPECIFIED SITE, UNSPECIFIED WHETHER RHEUMATOID FACTOR PRESENT: Primary | ICD-10-CM

## 2025-05-01 LAB
ALT SERPL W/O P-5'-P-CCNC: 37 UNIT/L (ref 10–44)
AST SERPL-CCNC: 20 UNIT/L (ref 11–45)
CREAT SERPL-MCNC: 0.9 MG/DL (ref 0.5–1.4)
CRP SERPL-MCNC: <0.3 MG/L
GFR SERPLBLD CREATININE-BSD FMLA CKD-EPI: >60 ML/MIN/1.73/M2

## 2025-05-01 PROCEDURE — 99215 OFFICE O/P EST HI 40 MIN: CPT | Mod: S$GLB,,, | Performed by: INTERNAL MEDICINE

## 2025-05-01 PROCEDURE — 3079F DIAST BP 80-89 MM HG: CPT | Mod: CPTII,S$GLB,, | Performed by: INTERNAL MEDICINE

## 2025-05-01 PROCEDURE — 36415 COLL VENOUS BLD VENIPUNCTURE: CPT | Mod: PO

## 2025-05-01 PROCEDURE — 1159F MED LIST DOCD IN RCRD: CPT | Mod: CPTII,S$GLB,, | Performed by: INTERNAL MEDICINE

## 2025-05-01 PROCEDURE — 85652 RBC SED RATE AUTOMATED: CPT

## 2025-05-01 PROCEDURE — 86140 C-REACTIVE PROTEIN: CPT

## 2025-05-01 PROCEDURE — 3074F SYST BP LT 130 MM HG: CPT | Mod: CPTII,S$GLB,, | Performed by: INTERNAL MEDICINE

## 2025-05-01 PROCEDURE — 84460 ALANINE AMINO (ALT) (SGPT): CPT

## 2025-05-01 PROCEDURE — 84450 TRANSFERASE (AST) (SGOT): CPT

## 2025-05-01 PROCEDURE — 3008F BODY MASS INDEX DOCD: CPT | Mod: CPTII,S$GLB,, | Performed by: INTERNAL MEDICINE

## 2025-05-01 PROCEDURE — 82565 ASSAY OF CREATININE: CPT

## 2025-05-01 PROCEDURE — 85025 COMPLETE CBC W/AUTO DIFF WBC: CPT

## 2025-05-01 PROCEDURE — 99999 PR PBB SHADOW E&M-EST. PATIENT-LVL III: CPT | Mod: PBBFAC,,, | Performed by: INTERNAL MEDICINE

## 2025-05-01 RX ORDER — HYDROXYCHLOROQUINE SULFATE 200 MG/1
200 TABLET, FILM COATED ORAL 2 TIMES DAILY
Qty: 60 TABLET | Refills: 3 | Status: SHIPPED | OUTPATIENT
Start: 2025-05-01

## 2025-05-01 RX ORDER — METHOCARBAMOL 500 MG/1
TABLET, FILM COATED ORAL
COMMUNITY
Start: 2025-04-29

## 2025-05-01 RX ORDER — HYDROCODONE BITARTRATE AND ACETAMINOPHEN 5; 325 MG/1; MG/1
1 TABLET ORAL EVERY 12 HOURS PRN
Qty: 14 TABLET | Refills: 0 | Status: SHIPPED | OUTPATIENT
Start: 2025-05-01 | End: 2025-05-01 | Stop reason: SDUPTHER

## 2025-05-01 RX ORDER — HYDROCODONE BITARTRATE AND ACETAMINOPHEN 5; 325 MG/1; MG/1
1 TABLET ORAL EVERY 12 HOURS PRN
Qty: 14 TABLET | Refills: 0 | Status: SHIPPED | OUTPATIENT
Start: 2025-05-01 | End: 2025-05-08

## 2025-05-01 RX ORDER — FOLIC ACID 1 MG/1
1000 TABLET ORAL DAILY
Qty: 90 TABLET | Refills: 3 | Status: SHIPPED | OUTPATIENT
Start: 2025-05-01 | End: 2026-05-01

## 2025-05-01 RX ORDER — METHOTREXATE 25 MG/ML
20 INJECTION, SOLUTION INTRAMUSCULAR; INTRATHECAL; INTRAVENOUS; SUBCUTANEOUS WEEKLY
Qty: 6 ML | Refills: 3 | Status: SHIPPED | OUTPATIENT
Start: 2025-05-01 | End: 2032-04-23

## 2025-05-01 RX ORDER — UPADACITINIB 15 MG/1
15 TABLET, EXTENDED RELEASE ORAL DAILY
Qty: 30 TABLET | Refills: 11 | Status: SHIPPED | OUTPATIENT
Start: 2025-05-01 | End: 2026-05-01

## 2025-05-01 RX ORDER — UPADACITINIB 15 MG/1
15 TABLET, EXTENDED RELEASE ORAL DAILY
Qty: 30 TABLET | Refills: 11 | Status: SHIPPED | OUTPATIENT
Start: 2025-05-01 | End: 2025-05-01 | Stop reason: SDUPTHER

## 2025-05-01 NOTE — PROGRESS NOTES
Subjective:          Chief Complaint: Annita Bettencourt is a 54 y.o. female who had concerns including Disease Management.    HPI:  Patient is a 54-year-old female here for consultation f/u sero+ high titer RA  Hands at MCP and wrist.   Iritis in 2022 treated with topical gtt,   Changed to Cimzia 3/2022  [facility] [place] [facility] (Dr. Goldstein). ? If this is related to RA. Did have very dry eyes on Systane BID at minimum. She completed OCT scan 2023. No prior hx of iritis.     5/2025: patient withcontinued disease activity now 3 months on Kevzara. MRI with right elbow cartilage loss did meet with ortho about options.   Flares weekly.     12/2024: long discussion about Actemra. Having pain in right lateral epicondyle requesting injection.   Starting to note flare of various joints since off biologics. Spoke with Dr. Campbell in Derm low incidence of recurrence melanoma.     10/2024: patient returns today we did send messages on portal for her to DC cimzia but she has not read these. She is permanent DC for all TNFi with dx of melanoma until I can speak with Derm about plans going forward. Continue MTX.     8/29/2024: patient received call prior to this appt with +melanoma.     10/2023: patient doing well Rapid 3: 0.5  Current: Cimzia , HCQ 200mg BID, MTX 0.6mL SQ weekly, folic acid.   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   She is walking few miles few times weekly.   She has lost 82#, Feeling markedly better! Mounjaro since October 22 with diet and exercise.       She is currently on  Cimzia (3/2022-OFF tolerated well very effective. Decision 10/2024 to stop TNF class for melanoma. )   MTX  sq  25 mg once weekly (cao from oral 12/2022 for nausea)   hydroxychloroquine 200 mgBID (since consultation as previously on once daily)   ibuprofen 800 mg t.i.d. p.r.n..    Tramadol 100mg TID PRN. - rare  Predniosne PRN has not used in nearly 1 year.   She has previously been on varying doses of  prednisone PRN flares approx 1-2 monthly (50mg, 40mg, 30mg, 20mg and 10mg etc).     Previous medication:   Humira- congestion/HA .  Enbrel lost efficacy, iritis and URI symptoms returned.       +AM stiffness <30min. Joint improve with activity.   The patient was diagnosed 5+  years ago    She has chronic dry eyes using systane. + dry mouth-  Patient currently has pain in her right shoulder right wrist of rates his pain at 2/10.    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 No MI  Still with sleep difficulty: Flexeril did not help. Doxepin 10mg not helping. Restless with sleep latency, stays asleep once down.    Component      Latest Ref Rng & Units 8/7/2018   NIL      See text IU/mL 0.050   TB1 - Nil      See text IU/mL -0.030   TB2 - Nil      See text IU/mL -0.030   Mitogen - Nil      See text IU/mL 8.140   TB Gold Plus       Negative   Hepatitis B Surface Ag       Negative   Hep B C IgM       Negative   Hep A IgM       Negative   Hepatitis C Ab       Negative   Rheumatoid Factor      0.0 - 15.0 IU/mL 397.0 (H)   CCP Antibodies      <5.0 U/mL 160.5 (H)   Sed Rate      0 - 20 mm/Hr 9   CRP      0.0 - 8.2 mg/L 1.9       REVIEW OF SYSTEMS:    Review of Systems   Constitutional:  Negative for fever.   Eyes:  Negative for redness.   Respiratory:  Negative for cough and shortness of breath.    Cardiovascular:  Negative for chest pain.   Gastrointestinal:  Negative for constipation and diarrhea.   Genitourinary:  Negative for dysuria.   Skin:  Negative for rash.   Neurological:  Negative for headaches.   Endo/Heme/Allergies:  Does not bruise/bleed easily.               Objective:            Past Medical History:   Diagnosis Date    Acid reflux     Anxiety     Depression     Diabetes mellitus     Enlarged heart     Hypertension     Murmur     Rheumatoid arthritis      Family History   Problem Relation Name Age of Onset    Hypertension Father      Breast cancer Maternal Aunt      Breast cancer Maternal  "Grandmother       Social History     Tobacco Use    Smoking status: Every Day     Current packs/day: 0.25     Average packs/day: 0.3 packs/day for 25.0 years (6.3 ttl pk-yrs)     Types: Cigarettes    Smokeless tobacco: Never   Substance Use Topics    Alcohol use: Yes     Comment: socially    Drug use: No         Current Outpatient Medications on File Prior to Visit   Medication Sig Dispense Refill    esomeprazole magnesium 20 mg TbEC Take 20 mg by mouth once daily.      FLUoxetine 40 MG capsule Take 40 mg by mouth once daily.      folic acid (FOLVITE) 1 MG tablet Take 1 tablet (1,000 mcg total) by mouth once daily. 90 tablet 3    hydroxychloroquine (PLAQUENIL) 200 mg tablet TAKE 1 TABLET BY MOUTH TWICE DAILY 60 tablet 3    methocarbamoL (ROBAXIN) 500 MG Tab Take by mouth.      methotrexate 25 mg/mL injection Inject 0.8 mLs (20 mg total) into the muscle once a week. To be taken once weekly with daily folic acid for 365 doses 6 mL 3    nystatin (MYCOSTATIN) ointment 1 application 2 (two) times daily. (Patient taking differently: 1 application  as needed.)      ondansetron (ZOFRAN) 4 MG tablet Take 4 mg by mouth every 8 (eight) hours as needed.      sarilumab (KEVZARA) 200 mg/1.14 mL PnIj Inject 1.14 mLs (200 mg total) into the skin every 14 (fourteen) days. 2.28 mL 11    syringe with needle, safety (MONOJECT TB SAFETY SYRINGE) 1 mL 28 gauge x 1/2" Syrg 1 application  by Misc.(Non-Drug; Combo Route) route once a week. 50 each 2    tirzepatide (MOUNJARO SUBQ) Inject into the skin.      traMADoL (ULTRAM) 50 mg tablet TAKE 1 OR 2 TABLETS BY MOUTH EVERY 8 HOURS AS NEEDED FOR PAIN 180 tablet 2    traZODone (DESYREL) 50 MG tablet TAKE 1 OR 2 TABLETS BY MOUTH EVERY EVENING 60 tablet 11    atorvastatin (LIPITOR) 20 MG tablet Take 20 mg by mouth.       No current facility-administered medications on file prior to visit.       Vitals:    05/01/25 1506   BP: 127/81   Pulse: 82         Physical Exam:    Physical " Exam  Constitutional:       General: She is not in acute distress.     Appearance: She is well-developed.   Eyes:      General: Lids are normal.   Neurological:      Mental Status: She is alert and oriented to person, place, and time.   Psychiatric:         Behavior: Behavior normal.         Thought Content: Thought content normal.         Assessment:       No diagnosis found.          Plan:        Rheumatoid arthritis, involving unspecified site, unspecified whether rheumatoid factor present  -     methotrexate 25 mg/mL injection; Inject 0.8 mLs (20 mg total) into the muscle once a week. To be taken once weekly with daily folic acid for 365 doses  Dispense: 6 mL; Refill: 3  -     hydroxychloroquine (PLAQUENIL) 200 mg tablet; Take 1 tablet (200 mg total) by mouth 2 (two) times daily.  Dispense: 60 tablet; Refill: 3  -     folic acid (FOLVITE) 1 MG tablet; Take 1 tablet (1,000 mcg total) by mouth once daily.  Dispense: 90 tablet; Refill: 3  -     Discontinue: upadacitinib (RINVOQ) 15 mg 24 hr tablet; Take 1 tablet (15 mg total) by mouth once daily.  Dispense: 30 tablet; Refill: 11  -     upadacitinib (RINVOQ) 15 mg 24 hr tablet; Take 1 tablet (15 mg total) by mouth once daily.  Dispense: 30 tablet; Refill: 11  -     ALT (SGPT); Future; Expected date: 05/01/2025  -     AST (SGOT); Future; Expected date: 05/01/2025  -     CBC Auto Differential; Future; Expected date: 05/01/2025  -     C-Reactive Protein; Future; Expected date: 05/01/2025  -     Creatinine, Serum; Future; Expected date: 05/01/2025  -     Sedimentation rate; Future; Expected date: 05/01/2025  -     ALT (SGPT); Future; Expected date: 05/01/2025  -     AST (SGOT); Future; Expected date: 05/01/2025  -     CBC Auto Differential; Future; Expected date: 05/01/2025  -     C-Reactive Protein; Future; Expected date: 05/01/2025  -     Creatinine, Serum; Future; Expected date: 05/01/2025  -     Sedimentation rate; Future; Expected date:  05/01/2025    Immunosuppression due to drug therapy  -     ALT (SGPT); Future; Expected date: 05/01/2025  -     AST (SGOT); Future; Expected date: 05/01/2025  -     CBC Auto Differential; Future; Expected date: 05/01/2025  -     C-Reactive Protein; Future; Expected date: 05/01/2025  -     Creatinine, Serum; Future; Expected date: 05/01/2025  -     Sedimentation rate; Future; Expected date: 05/01/2025  -     ALT (SGPT); Future; Expected date: 05/01/2025  -     AST (SGOT); Future; Expected date: 05/01/2025  -     CBC Auto Differential; Future; Expected date: 05/01/2025  -     C-Reactive Protein; Future; Expected date: 05/01/2025  -     Creatinine, Serum; Future; Expected date: 05/01/2025  -     Sedimentation rate; Future; Expected date: 05/01/2025    High risk medications (not anticoagulants) long-term use  -     ALT (SGPT); Future; Expected date: 05/01/2025  -     AST (SGOT); Future; Expected date: 05/01/2025  -     CBC Auto Differential; Future; Expected date: 05/01/2025  -     C-Reactive Protein; Future; Expected date: 05/01/2025  -     Creatinine, Serum; Future; Expected date: 05/01/2025  -     Sedimentation rate; Future; Expected date: 05/01/2025  -     ALT (SGPT); Future; Expected date: 05/01/2025  -     AST (SGOT); Future; Expected date: 05/01/2025  -     CBC Auto Differential; Future; Expected date: 05/01/2025  -     C-Reactive Protein; Future; Expected date: 05/01/2025  -     Creatinine, Serum; Future; Expected date: 05/01/2025  -     Sedimentation rate; Future; Expected date: 05/01/2025    Rheumatoid arthritis involving both hands with positive rheumatoid factor  -     methotrexate 25 mg/mL injection; Inject 0.8 mLs (20 mg total) into the muscle once a week. To be taken once weekly with daily folic acid for 365 doses  Dispense: 6 mL; Refill: 3  -     hydroxychloroquine (PLAQUENIL) 200 mg tablet; Take 1 tablet (200 mg total) by mouth 2 (two) times daily.  Dispense: 60 tablet; Refill: 3  -     folic acid  (FOLVITE) 1 MG tablet; Take 1 tablet (1,000 mcg total) by mouth once daily.  Dispense: 90 tablet; Refill: 3    Immunosuppression  -     methotrexate 25 mg/mL injection; Inject 0.8 mLs (20 mg total) into the muscle once a week. To be taken once weekly with daily folic acid for 365 doses  Dispense: 6 mL; Refill: 3  -     hydroxychloroquine (PLAQUENIL) 200 mg tablet; Take 1 tablet (200 mg total) by mouth 2 (two) times daily.  Dispense: 60 tablet; Refill: 3  -     folic acid (FOLVITE) 1 MG tablet; Take 1 tablet (1,000 mcg total) by mouth once daily.  Dispense: 90 tablet; Refill: 3    Malaise and fatigue  -     methotrexate 25 mg/mL injection; Inject 0.8 mLs (20 mg total) into the muscle once a week. To be taken once weekly with daily folic acid for 365 doses  Dispense: 6 mL; Refill: 3  -     hydroxychloroquine (PLAQUENIL) 200 mg tablet; Take 1 tablet (200 mg total) by mouth 2 (two) times daily.  Dispense: 60 tablet; Refill: 3  -     folic acid (FOLVITE) 1 MG tablet; Take 1 tablet (1,000 mcg total) by mouth once daily.  Dispense: 90 tablet; Refill: 3    Cervical paraspinal muscle spasm  -     Discontinue: HYDROcodone-acetaminophen (NORCO) 5-325 mg per tablet; Take 1 tablet by mouth every 12 (twelve) hours as needed for Pain.  Dispense: 14 tablet; Refill: 0  -     HYDROcodone-acetaminophen (NORCO) 5-325 mg per tablet; Take 1 tablet by mouth every 12 (twelve) hours as needed for Pain.  Dispense: 14 tablet; Refill: 0        Patient is a 54-year-old female with rheumatoid arthritis recent diagnosis of early stage melanoma. Discussed all the literature we have thus far and while we have guidelines melanoma has been an unclear area with regard to biologic management and safety. Discussed with her Dermatologist she has less than 1% risk of recurrence given staging which is reassuring. She is not going to tolerate her RA w/ MTX alone.     Ok for prednisone if flares as you have done in past.   Failed Humira with URI/HA  Cimzia.  Start 3/14/22 - 10/2024 -STOP  MTX 0.6mL every week   HCQ 200mg BID-UTD as of 2022     Patient with left forearm melanoma discussion that we avoid TNFi as a class in melanoma and NMSC when we are able. Reviewed that previous ACR guidelines typically recommended rituxan for all malignancies <5 years, but this data is shifting as newer drugs emerge. We have limited data on safety of biologics with specific cancers but growing comfort with good f/u that we can use other biologics Ex IL-6 inhibitors w/ little risk for recurrence of malignancy.     Patient off Cimzia due to recent Melanoma. Decision to switch to IL-6 inhibition. Low risk for recurrence melanoma,  but felt safest option at this time.  Reviewed all the risks of immunosuppression in Melanoma her risk   She has now been on Kevzara 3 months with continued disease activity.   I discussed all r/b/se with Rinvoq and think this is best next option. Insurance will not cover Actemra.       F/u 4 months.     No follow-ups on file.      40min consultation with greater than 50% of that time included Preparing to see the patient (review records, tests), Obtaining and/or reviewing separately obtained historical data, Performing a medically appropriate examination and/or evaluation , Ordering medications, tests, and/or procedures, Referring and communicating with other healthcare professionals , Documenting clinical information in the electronic or other health record and Independently interpreting results  (as warranted) & communicating results to the patient/family/caregiver. All questions answered.      Patients treated with Jay are at increased risk for developing serious infections that may lead to hospitalization or death. Lymphoma and other malignancies have been observed in patients treated with Jay. Treatment with Jay was associated with an increased incidence of neutropenia.  Based on the review of a large randomized safety clinical trial, the FDA has  concluded there is an increased risk of serious heart-related events such as heart attack or stroke, cancer, blood clots, and death with the arthritis and ulcerative colitis medicines Jay. This trial compared Xeljanz with another type of medicine used to treat arthritis called tumor necrosis factor (TNF) blockers in patients with rheumatoid arthritis.  Data for Rinvoq is 0.5 per 100 patient years with a predominance in males.

## 2025-05-02 LAB
ABSOLUTE EOSINOPHIL (OHS): 0.27 K/UL
ABSOLUTE MONOCYTE (OHS): 0.52 K/UL (ref 0.3–1)
ABSOLUTE NEUTROPHIL COUNT (OHS): 2.75 K/UL (ref 1.8–7.7)
BASOPHILS # BLD AUTO: 0.08 K/UL
BASOPHILS NFR BLD AUTO: 1.5 %
ERYTHROCYTE [DISTWIDTH] IN BLOOD BY AUTOMATED COUNT: 14.6 % (ref 11.5–14.5)
ERYTHROCYTE [SEDIMENTATION RATE] IN BLOOD BY PHOTOMETRIC METHOD: 2 MM/HR
HCT VFR BLD AUTO: 46.9 % (ref 37–48.5)
HGB BLD-MCNC: 14.9 GM/DL (ref 12–16)
IMM GRANULOCYTES # BLD AUTO: 0.01 K/UL (ref 0–0.04)
IMM GRANULOCYTES NFR BLD AUTO: 0.2 % (ref 0–0.5)
LYMPHOCYTES # BLD AUTO: 1.86 K/UL (ref 1–4.8)
MCH RBC QN AUTO: 30.1 PG (ref 27–31)
MCHC RBC AUTO-ENTMCNC: 31.8 G/DL (ref 32–36)
MCV RBC AUTO: 95 FL (ref 82–98)
NUCLEATED RBC (/100WBC) (OHS): 0 /100 WBC
PLATELET # BLD AUTO: 197 K/UL (ref 150–450)
PMV BLD AUTO: 11.7 FL (ref 9.2–12.9)
RBC # BLD AUTO: 4.95 M/UL (ref 4–5.4)
RELATIVE EOSINOPHIL (OHS): 4.9 %
RELATIVE LYMPHOCYTE (OHS): 33.9 % (ref 18–48)
RELATIVE MONOCYTE (OHS): 9.5 % (ref 4–15)
RELATIVE NEUTROPHIL (OHS): 50 % (ref 38–73)
WBC # BLD AUTO: 5.49 K/UL (ref 3.9–12.7)

## 2025-05-09 ENCOUNTER — TELEPHONE (OUTPATIENT)
Dept: RHEUMATOLOGY | Facility: CLINIC | Age: 55
End: 2025-05-09
Payer: COMMERCIAL

## 2025-05-09 DIAGNOSIS — Z79.899 HIGH RISK MEDICATION USE: ICD-10-CM

## 2025-05-09 DIAGNOSIS — Z51.81 MEDICATION MONITORING ENCOUNTER: ICD-10-CM

## 2025-05-09 DIAGNOSIS — M06.9 RHEUMATOID ARTHRITIS, INVOLVING UNSPECIFIED SITE, UNSPECIFIED WHETHER RHEUMATOID FACTOR PRESENT: Primary | ICD-10-CM

## 2025-05-09 NOTE — TELEPHONE ENCOUNTER
----- Message from Jumana Clemons sent at 5/9/2025 10:14 AM CDT -----  Regarding: Rinvoq Labs  Good morning,Mrs Bettencourt will be starting Rinvoq next week. It is recommended to repeat a lipid panel about 12 weeks after starting therapy. If appropriate, please place lab order for patient to complete at that time.Thank you,Deonte Luz, PharmDClinical Pharmacist [facility] P: [phone]

## 2025-05-09 NOTE — TELEPHONE ENCOUNTER
Sure thing. Ordered needed lab    Staff,  Please call patient to schedule needed lab in 3-4 months. Thanks!

## 2025-05-24 ENCOUNTER — RESULTS FOLLOW-UP (OUTPATIENT)
Dept: RHEUMATOLOGY | Facility: CLINIC | Age: 55
End: 2025-05-24

## 2025-06-07 ENCOUNTER — PATIENT MESSAGE (OUTPATIENT)
Dept: ADMINISTRATIVE | Facility: OTHER | Age: 55
End: 2025-06-07
Payer: COMMERCIAL

## 2025-06-19 ENCOUNTER — OFFICE VISIT (OUTPATIENT)
Dept: PHYSICAL MEDICINE AND REHAB | Facility: CLINIC | Age: 55
End: 2025-06-19
Payer: COMMERCIAL

## 2025-06-19 VITALS — SYSTOLIC BLOOD PRESSURE: 136 MMHG | DIASTOLIC BLOOD PRESSURE: 62 MMHG | HEART RATE: 67 BPM

## 2025-06-19 DIAGNOSIS — M77.11 LATERAL EPICONDYLITIS OF RIGHT ELBOW: Primary | ICD-10-CM

## 2025-06-19 PROCEDURE — 99999 PR PBB SHADOW E&M-EST. PATIENT-LVL III: CPT | Mod: PBBFAC,,, | Performed by: STUDENT IN AN ORGANIZED HEALTH CARE EDUCATION/TRAINING PROGRAM

## 2025-06-19 NOTE — ASSESSMENT & PLAN NOTE
She continues to experience some relief from right elbow injection for lateral epicondylitis.  She has failed several injections with the most recent being approx 2 months ago providing very short lived relief.  Reviewed MRI of the right elbow  Continue counterforce strap.   RTC p.r.n..  Recommend follow up with orthopedic hand surgery for surgical intervention.  She does have some smoldering left elbow pain that is similar in character.  We can inject this in the future if needed but I would recommend that she continue the home exercise program in the meantime.

## 2025-06-19 NOTE — PROGRESS NOTES
PHYSICAL MEDICINE AND REHABILITATION  Follow up visit:    Subjective:   Chief Complaint:    Chief Complaint   Patient presents with    Arm Pain     Both      Interval note on 06/19/2025:  Patient arrives today for scheduled follow up.  She recalls having a visit with Orthopedic hand surgery who recommended surgical intervention for her elbow pain.  She has been experiencing relief from her previous injection.    Review of Systems  Per HPI    Imaging/Diagnostic Studies  XR ELBOW COMPLETE 3 VIEW RIGHT  Order: [id number]  Impression:  1. No radiographic evidence of displaced fracture or dislocation.    Narrative  XR ELBOW 3+ VIEW RIGHT  Indication: M25.521:Pain in right elbow,  Findings: There is no radiographic evidence of acute displaced fracture or dislocation. There is no acute abnormality in alignment. There is no acute abnormality in mineralization. Soft tissues are grossly unremarkable.  Exam End: 11/22/23 10:42 Last Resulted: 11/22/23 10:45     Past Medical History:   Diagnosis Date    Acid reflux     Anxiety     Depression     Diabetes mellitus     Enlarged heart     Hypertension     Murmur     Rheumatoid arthritis      Past Surgical History:   Procedure Laterality Date    ADENOIDECTOMY      CHOLECYSTECTOMY      HYSTERECTOMY      OOPHORECTOMY      TONSILLECTOMY       Review of patient's allergies indicates:   Allergen Reactions    Sulfa (sulfonamide antibiotics) Rash     Current Outpatient Medications   Medication Sig Dispense Refill    atorvastatin (LIPITOR) 20 MG tablet Take 20 mg by mouth.      esomeprazole magnesium 20 mg TbEC Take 20 mg by mouth once daily.      FLUoxetine 40 MG capsule Take 40 mg by mouth once daily.      folic acid (FOLVITE) 1 MG tablet Take 1 tablet (1,000 mcg total) by mouth once daily. 90 tablet 3    hydroxychloroquine (PLAQUENIL) 200 mg tablet Take 1 tablet (200 mg total) by mouth 2 (two) times daily. 60 tablet 3    methocarbamoL (ROBAXIN) 500 MG Tab Take by mouth.      methotrexate  "25 mg/mL injection Inject 0.8 mLs (20 mg total) into the muscle once a week. To be taken once weekly with daily folic acid for 365 doses 6 mL 3    ondansetron (ZOFRAN) 4 MG tablet Take 4 mg by mouth every 8 (eight) hours as needed.      syringe with needle, safety (MONOJECT TB SAFETY SYRINGE) 1 mL 28 gauge x 1/2" Syrg 1 application  by Misc.(Non-Drug; Combo Route) route once a week. 50 each 2    tirzepatide (MOUNJARO SUBQ) Inject into the skin.      traMADoL (ULTRAM) 50 mg tablet TAKE 1 OR 2 TABLETS BY MOUTH EVERY 8 HOURS AS NEEDED FOR PAIN 180 tablet 2    traZODone (DESYREL) 50 MG tablet TAKE 1 OR 2 TABLETS BY MOUTH EVERY EVENING 60 tablet 11    upadacitinib (RINVOQ) 15 mg 24 hr tablet Take 1 tablet (15 mg total) by mouth once daily. 30 tablet 11     No current facility-administered medications for this visit.     Family History   Problem Relation Name Age of Onset    Hypertension Father      Breast cancer Maternal Aunt      Breast cancer Maternal Grandmother       Social History     Socioeconomic History    Marital status:    Tobacco Use    Smoking status: Every Day     Current packs/day: 0.25     Average packs/day: 0.3 packs/day for 25.0 years (6.3 ttl pk-yrs)     Types: Cigarettes    Smokeless tobacco: Never   Substance and Sexual Activity    Alcohol use: Yes     Comment: socially    Drug use: No     Social Drivers of Health     Financial Resource Strain: Low Risk  (4/22/2025)    Overall Financial Resource Strain (CARDIA)     Difficulty of Paying Living Expenses: Not hard at all   Food Insecurity: No Food Insecurity (4/22/2025)    Hunger Vital Sign     Worried About Running Out of Food in the Last Year: Never true     Ran Out of Food in the Last Year: Never true   Transportation Needs: No Transportation Needs (4/22/2025)    PRAPARE - Transportation     Lack of Transportation (Medical): No     Lack of Transportation (Non-Medical): No   Physical Activity: Sufficiently Active (4/22/2025)    Exercise Vital " Sign     Days of Exercise per Week: 3 days     Minutes of Exercise per Session: 60 min   Stress: No Stress Concern Present (4/22/2025)    [employer] [place] [employer] - Occupational Stress Questionnaire     Feeling of Stress : Only a little   Housing Stability: Low Risk  (4/22/2025)    Housing Stability Vital Sign     Unable to Pay for Housing in the Last Year: No     Number of Times Moved in the Last Year: 0     Homeless in the Last Year: No         Objective:    /62 (BP Location: Left arm, Patient Position: Sitting)   Pulse 67   Physical Exam  Vitals and nursing note reviewed.   Constitutional:       Appearance: Normal appearance.   HENT:      Head: Normocephalic.   Eyes:      Extraocular Movements: Extraocular movements intact.   Cardiovascular:      Rate and Rhythm: Normal rate.      Pulses: Normal pulses.   Pulmonary:      Effort: Pulmonary effort is normal.   Abdominal:      General: Abdomen is flat.   Skin:     General: Skin is warm and dry.   Neurological:      General: No focal deficit present.      Mental Status: She is alert and oriented to person, place, and time. Mental status is at baseline.   Psychiatric:         Mood and Affect: Mood normal.         Behavior: Behavior normal.         Thought Content: Thought content normal.        Right Elbow Exam     Tenderness   The patient is experiencing tenderness in the lateral epicondyle.     Range of Motion   Extension:  normal   Flexion:  normal   Pronation:  normal   Supination:  normal     Muscle Strength   The patient has normal right elbow strength.    Tests   Varus: negative  Valgus: negative  Tinel's sign (cubital tunnel): negative    Other   Erythema: absent  Scars: absent  Sensation: normal  Pulse: present    Comments:  Positive Cozen's      Left Elbow Exam     Tenderness   The patient is experiencing tenderness in the lateral epicondyle.     Muscle Strength   The patient has normal left elbow strength.    Other   Erythema:  absent  Scars: absent  Sensation: normal  Pulse: present           Assessment:       ICD-10-CM ICD-9-CM    1. Lateral epicondylitis of right elbow  M77.11 726.32           Plan:   1. Lateral epicondylitis of right elbow  Assessment & Plan:  She continues to experience some relief from right elbow injection for lateral epicondylitis.  She has failed several injections with the most recent being approx 2 months ago providing very short lived relief.  Reviewed MRI of the right elbow  Continue counterforce strap.   RTC p.r.n..  Recommend follow up with orthopedic hand surgery for surgical intervention.  She does have some smoldering left elbow pain that is similar in character.  We can inject this in the future if needed but I would recommend that she continue the home exercise program in the meantime.        Aly Nathan MD  Physical Medicine & Rehabilitation     Disclaimer:  This note may have been prepared using voice recognition software, it may have not been extensively proofed, as such there could be errors within the text such as sound alike errors.  Contact the author of this note for clarification.

## 2025-07-08 ENCOUNTER — PATIENT MESSAGE (OUTPATIENT)
Dept: RHEUMATOLOGY | Facility: CLINIC | Age: 55
End: 2025-07-08
Payer: COMMERCIAL

## 2025-08-04 ENCOUNTER — PATIENT MESSAGE (OUTPATIENT)
Dept: RHEUMATOLOGY | Facility: CLINIC | Age: 55
End: 2025-08-04
Payer: COMMERCIAL

## 2025-08-05 DIAGNOSIS — H66.3X9 CHRONIC SUPPURATIVE OTITIS MEDIA, UNSPECIFIED LATERALITY, UNSPECIFIED OTITIS MEDIA LOCATION: Primary | ICD-10-CM

## 2025-08-13 ENCOUNTER — OFFICE VISIT (OUTPATIENT)
Dept: OTOLARYNGOLOGY | Facility: CLINIC | Age: 55
End: 2025-08-13
Payer: COMMERCIAL

## 2025-08-13 ENCOUNTER — CLINICAL SUPPORT (OUTPATIENT)
Dept: AUDIOLOGY | Facility: CLINIC | Age: 55
End: 2025-08-13
Payer: COMMERCIAL

## 2025-08-13 VITALS — BODY MASS INDEX: 35.27 KG/M2 | WEIGHT: 192.88 LBS

## 2025-08-13 DIAGNOSIS — H92.03 EAR PAIN, BILATERAL: Primary | ICD-10-CM

## 2025-08-13 DIAGNOSIS — J30.9 ALLERGIC RHINITIS, UNSPECIFIED SEASONALITY, UNSPECIFIED TRIGGER: ICD-10-CM

## 2025-08-13 DIAGNOSIS — H69.93 ETD (EUSTACHIAN TUBE DYSFUNCTION), BILATERAL: ICD-10-CM

## 2025-08-13 DIAGNOSIS — Z72.0 TOBACCO ABUSE: Primary | ICD-10-CM

## 2025-08-13 PROCEDURE — 3008F BODY MASS INDEX DOCD: CPT | Mod: CPTII,S$GLB,, | Performed by: NURSE PRACTITIONER

## 2025-08-13 PROCEDURE — 99999 PR PBB SHADOW E&M-EST. PATIENT-LVL IV: CPT | Mod: PBBFAC,,, | Performed by: NURSE PRACTITIONER

## 2025-08-13 PROCEDURE — 1159F MED LIST DOCD IN RCRD: CPT | Mod: CPTII,S$GLB,, | Performed by: NURSE PRACTITIONER

## 2025-08-13 PROCEDURE — 99999 PR PBB SHADOW E&M-EST. PATIENT-LVL I: CPT | Mod: PBBFAC,,,

## 2025-08-13 PROCEDURE — 99203 OFFICE O/P NEW LOW 30 MIN: CPT | Mod: S$GLB,,, | Performed by: NURSE PRACTITIONER

## 2025-08-13 PROCEDURE — 92567 TYMPANOMETRY: CPT | Mod: S$GLB,,,

## 2025-08-13 RX ORDER — AZELASTINE 1 MG/ML
1 SPRAY, METERED NASAL 2 TIMES DAILY
Qty: 30 ML | Refills: 12 | Status: SHIPPED | OUTPATIENT
Start: 2025-08-13 | End: 2026-08-13

## 2025-08-13 RX ORDER — FLUTICASONE PROPIONATE 50 MCG
1 SPRAY, SUSPENSION (ML) NASAL 2 TIMES DAILY
Qty: 16 G | Refills: 12 | Status: SHIPPED | OUTPATIENT
Start: 2025-08-13 | End: 2026-08-13

## 2025-08-30 ENCOUNTER — LAB VISIT (OUTPATIENT)
Dept: LAB | Facility: HOSPITAL | Age: 55
End: 2025-08-30
Attending: INTERNAL MEDICINE
Payer: COMMERCIAL

## 2025-08-30 DIAGNOSIS — Z79.899 HIGH RISK MEDICATION USE: ICD-10-CM

## 2025-08-30 DIAGNOSIS — D84.821 IMMUNOSUPPRESSION DUE TO DRUG THERAPY: ICD-10-CM

## 2025-08-30 DIAGNOSIS — M06.9 RHEUMATOID ARTHRITIS, INVOLVING UNSPECIFIED SITE, UNSPECIFIED WHETHER RHEUMATOID FACTOR PRESENT: ICD-10-CM

## 2025-08-30 DIAGNOSIS — Z79.899 IMMUNOSUPPRESSION DUE TO DRUG THERAPY: ICD-10-CM

## 2025-08-30 DIAGNOSIS — Z79.899 HIGH RISK MEDICATIONS (NOT ANTICOAGULANTS) LONG-TERM USE: ICD-10-CM

## 2025-08-30 DIAGNOSIS — Z51.81 MEDICATION MONITORING ENCOUNTER: ICD-10-CM

## 2025-08-30 LAB
ABSOLUTE EOSINOPHIL (OHS): 0.14 K/UL
ABSOLUTE MONOCYTE (OHS): 0.5 K/UL (ref 0.3–1)
ABSOLUTE NEUTROPHIL COUNT (OHS): 4.21 K/UL (ref 1.8–7.7)
ALT SERPL W/O P-5'-P-CCNC: 36 UNIT/L (ref 0–55)
AST SERPL-CCNC: 30 UNIT/L (ref 0–50)
BASOPHILS # BLD AUTO: 0.06 K/UL
BASOPHILS NFR BLD AUTO: 0.9 %
CHOLEST SERPL-MCNC: 167 MG/DL (ref 120–199)
CHOLEST/HDLC SERPL: 3 {RATIO} (ref 2–5)
CREAT SERPL-MCNC: 0.9 MG/DL (ref 0.5–1.4)
CRP SERPL-MCNC: <0.3 MG/L
ERYTHROCYTE [DISTWIDTH] IN BLOOD BY AUTOMATED COUNT: 13.4 % (ref 11.5–14.5)
ERYTHROCYTE [SEDIMENTATION RATE] IN BLOOD BY PHOTOMETRIC METHOD: 4 MM/HR
GFR SERPLBLD CREATININE-BSD FMLA CKD-EPI: >60 ML/MIN/1.73/M2
HCT VFR BLD AUTO: 40.8 % (ref 37–48.5)
HDLC SERPL-MCNC: 56 MG/DL (ref 40–75)
HDLC SERPL: 33.5 % (ref 20–50)
HGB BLD-MCNC: 13.1 GM/DL (ref 12–16)
IMM GRANULOCYTES # BLD AUTO: 0.06 K/UL (ref 0–0.04)
IMM GRANULOCYTES NFR BLD AUTO: 0.9 % (ref 0–0.5)
LDLC SERPL CALC-MCNC: 99.8 MG/DL (ref 63–159)
LYMPHOCYTES # BLD AUTO: 1.77 K/UL (ref 1–4.8)
MCH RBC QN AUTO: 30.3 PG (ref 27–31)
MCHC RBC AUTO-ENTMCNC: 32.1 G/DL (ref 32–36)
MCV RBC AUTO: 94 FL (ref 82–98)
NONHDLC SERPL-MCNC: 111 MG/DL
NUCLEATED RBC (/100WBC) (OHS): 0 /100 WBC
PLATELET # BLD AUTO: 231 K/UL (ref 150–450)
PMV BLD AUTO: 11.3 FL (ref 9.2–12.9)
RBC # BLD AUTO: 4.33 M/UL (ref 4–5.4)
RELATIVE EOSINOPHIL (OHS): 2.1 %
RELATIVE LYMPHOCYTE (OHS): 26.3 % (ref 18–48)
RELATIVE MONOCYTE (OHS): 7.4 % (ref 4–15)
RELATIVE NEUTROPHIL (OHS): 62.4 % (ref 38–73)
TRIGL SERPL-MCNC: 56 MG/DL (ref 30–150)
WBC # BLD AUTO: 6.74 K/UL (ref 3.9–12.7)

## 2025-08-30 PROCEDURE — 80061 LIPID PANEL: CPT

## 2025-08-30 PROCEDURE — 84450 TRANSFERASE (AST) (SGOT): CPT

## 2025-08-30 PROCEDURE — 84460 ALANINE AMINO (ALT) (SGPT): CPT

## 2025-08-30 PROCEDURE — 82565 ASSAY OF CREATININE: CPT

## 2025-08-30 PROCEDURE — 86140 C-REACTIVE PROTEIN: CPT

## 2025-08-30 PROCEDURE — 36415 COLL VENOUS BLD VENIPUNCTURE: CPT | Mod: PO

## 2025-08-30 PROCEDURE — 85025 COMPLETE CBC W/AUTO DIFF WBC: CPT

## 2025-08-30 PROCEDURE — 85652 RBC SED RATE AUTOMATED: CPT
